# Patient Record
Sex: FEMALE | Race: ASIAN | NOT HISPANIC OR LATINO | Employment: FULL TIME | URBAN - METROPOLITAN AREA
[De-identification: names, ages, dates, MRNs, and addresses within clinical notes are randomized per-mention and may not be internally consistent; named-entity substitution may affect disease eponyms.]

---

## 2017-11-24 ENCOUNTER — OFFICE VISIT (OUTPATIENT)
Dept: OBGYN | Facility: CLINIC | Age: 37
End: 2017-11-24
Payer: COMMERCIAL

## 2017-11-24 VITALS
DIASTOLIC BLOOD PRESSURE: 51 MMHG | HEART RATE: 73 BPM | HEIGHT: 62 IN | WEIGHT: 121 LBS | BODY MASS INDEX: 22.26 KG/M2 | SYSTOLIC BLOOD PRESSURE: 100 MMHG

## 2017-11-24 DIAGNOSIS — Z01.419 WELL FEMALE EXAM WITH ROUTINE GYNECOLOGICAL EXAM: ICD-10-CM

## 2017-11-24 DIAGNOSIS — N64.52 NIPPLE DISCHARGE: Primary | ICD-10-CM

## 2017-11-24 DIAGNOSIS — L20.9 ATOPIC DERMATITIS, UNSPECIFIED TYPE: ICD-10-CM

## 2017-11-24 PROCEDURE — 99214 OFFICE O/P EST MOD 30 MIN: CPT | Performed by: OBSTETRICS & GYNECOLOGY

## 2017-11-24 RX ORDER — FLUOCINONIDE 0.5 MG/G
OINTMENT TOPICAL
Qty: 30 G | Refills: 1 | Status: SHIPPED | OUTPATIENT
Start: 2017-11-24 | End: 2018-10-12

## 2017-11-24 NOTE — MR AVS SNAPSHOT
After Visit Summary   11/24/2017    Lorraine Huston    MRN: [id number]           Patient Information     Date Of Birth          1980        Visit Information        Provider Department      11/24/2017 1:30 PM Morelia Lugo MD [facility]        Today's Diagnoses     Nipple discharge    -  1    Atopic dermatitis, unspecified type        Well female exam with routine gynecological exam          Care Instructions      Preventive Health Recommendations  Female Ages 26 - 39  Yearly exam:   See your health care provider every year in order to    Review health changes.     Discuss preventive care.      Review your medicines if you your doctor has prescribed any.    Until age 30: Get a Pap test every three years (more often if you have had an abnormal result).    After age 30: Talk to your doctor about whether you should have a Pap test every 3 years or have a Pap test with HPV screening every 5 years.   You do not need a Pap test if your uterus was removed (hysterectomy) and you have not had cancer.  You should be tested each year for STDs (sexually transmitted diseases), if you're at risk.   Talk to your provider about how often to have your cholesterol checked.  If you are at risk for diabetes, you should have a diabetes test (fasting glucose).  Shots: Get a flu shot each year. Get a tetanus shot every 10 years.   Nutrition:     Eat at least 5 servings of fruits and vegetables each day.    Eat whole-grain bread, whole-wheat pasta and brown rice instead of white grains and rice.    Talk to your provider about Calcium and Vitamin D.     Lifestyle    Exercise at least 150 minutes a week (30 minutes a day, 5 days of the week). This will help you control your weight and prevent disease.    Limit alcohol to one drink per day.    No smoking.     Wear sunscreen to prevent skin cancer.    See your dentist every six months for an exam and cleaning.            Follow-ups after your  "visit        Future tests that were ordered for you today     Open Future Orders        Priority Expected Expires Ordered    Lipid panel reflex to direct LDL Fasting Routine  1/24/2018 11/24/2017    Prolactin Routine  1/24/2018 11/24/2017            Who to contact     If you have questions or need follow up information about today's clinic visit or your schedule please contact [facility] directly at [phone].  Normal or non-critical lab and imaging results will be communicated to you by Clikthroughhart, letter or phone within 4 business days after the clinic has received the results. If you do not hear from us within 7 days, please contact the clinic through [employer] or phone. If you have a critical or abnormal lab result, we will notify you by phone as soon as possible.  Submit refill requests through [employer] or call your pharmacy and they will forward the refill request to us. Please allow 3 business days for your refill to be completed.          Additional Information About Your Visit        Clikthroughhart Information     [employer] gives you secure access to your electronic health record. If you see a primary care provider, you can also send messages to your care team and make appointments. If you have questions, please call your primary care clinic.  If you do not have a primary care provider, please call [phone] and they will assist you.        Care EveryWhere ID     This is your Care EveryWhere ID. This could be used by other organizations to access your [place] medical records  [id number]        Your Vitals Were     Pulse Height Last Period Breastfeeding? BMI (Body Mass Index)       73 5' 1.75\" (1.568 m) 11/24/2017 No 22.31 kg/m2        Blood Pressure from Last 3 Encounters:   11/24/17 100/51   05/16/16 104/53   05/02/16 97/58    Weight from Last 3 Encounters:   11/24/17 121 lb (54.9 kg)   05/16/16 121 lb (54.9 kg)   05/02/16 121 lb (54.9 kg)                 Today's Medication Changes          These " changes are accurate as of: 11/24/17  3:22 PM.  If you have any questions, ask your nurse or doctor.               Start taking these medicines.        Dose/Directions    fluocinonide 0.05 % ointment   Commonly known as:  LIDEX   Used for:  Atopic dermatitis, unspecified type   Started by:  Morelia Lugo MD        Apply sparingly to affected area on breast twice daily as needed.  Do not apply to face.   Quantity:  30 g   Refills:  1            Where to get your medicines      These medications were sent to [facility] [place] [address] [facility]  [address] [facility] [address]     Phone:  [phone]     fluocinonide 0.05 % ointment                Primary Care Provider    Physician No Ref-Primary       NO REF-PRIMARY PHYSICIAN        Equal Access to Services     [employer] MIRELES : Zain Atkinson, wanadia luqadaha, qaybta kaalmakamron scott, mayco schultz. So [facility] [phone].    ATENCIÓN: Si habla español, tiene a escobedo disposición servicios gratuitos de asistencia lingüística. Llame al [phone].    We comply with applicable federal civil rights laws and [place] laws. We do not discriminate on the basis of race, color, national origin, age, disability, sex, sexual orientation, or gender identity.            Thank you!     Thank you for choosing [facility]  for your care. Our goal is always to provide you with excellent care. Hearing back from our patients is one way we can continue to improve our services. Please take a few minutes to complete the written survey that you may receive in the mail after your visit with us. Thank you!             Your Updated Medication List - Protect others around you: Learn how to safely use, store and throw away your medicines at www.disposemymeds.org.          This list is accurate as of: 11/24/17  3:22 PM.  Always use your most recent med list.                   Brand Name Dispense  Instructions for use Diagnosis    albuterol 108 (90 BASE) MCG/ACT Inhaler    PROAIR HFA/PROVENTIL HFA/VENTOLIN HFA     Inhale 2 puffs into the lungs every 6 hours        fluocinonide 0.05 % ointment    LIDEX    30 g    Apply sparingly to affected area on breast twice daily as needed.  Do not apply to face.    Atopic dermatitis, unspecified type       VITAMIN C PO

## 2017-11-24 NOTE — PROGRESS NOTES
SUBJECTIVE:   CC: Lorraine Huston is an 37 year old woman who presents for preventive health visit.     Healthy Habits:    Do you get at least three servings of calcium containing foods daily (dairy, green leafy vegetables, etc.)? yes    Amount of exercise or daily activities, outside of work: 5 day(s) per week    Problems taking medications regularly No    Medication side effects: No    Have you had an eye exam in the past two years? yes    Do you see a dentist twice per year? yes    Do you have sleep apnea, excessive snoring or daytime drowsiness?no      C/o of itching of her nipples for the last couple of months. Has had this happen to her before in the past. She was referred to dermatology and was prescribed fluocinonide ointment which treated it affectively.   This time she has noticed thickening of skin at the areola of both breasts. She also reports the past 2-3 days noticing a crusty yellow colored discharge on the padding of her bra from both breasts. When she aligned the crusty discharge back on to her breast, it appeared to be touching the surrounding areola region rather than her nipple. She has not noticed spontaneous discharge from her nipples. She has never tried to elicit discharge from her nipples.   She does reports a history of eczema.       Today's PHQ-2 Score: PHQ-2 ( 1999 Pfizer) 11/24/2017 5/2/2016   Q1: Little interest or pleasure in doing things 0 0   Q2: Feeling down, depressed or hopeless 0 0   PHQ-2 Score 0 0         Abuse: Current or Past(Physical, Sexual or Emotional)- No  Do you feel safe in your environment - Yes  Social History   Substance Use Topics     Smoking status: Never Smoker     Smokeless tobacco: Never Used     Alcohol use No     The patient does not drink >3 drinks per day nor >7 drinks per week.    Reviewed orders with patient.  Reviewed health maintenance and updated orders accordingly - Yes  Current Outpatient Prescriptions   Medication Sig Dispense Refill      Ascorbic Acid (VITAMIN C PO)        albuterol (PROAIR HFA, PROVENTIL HFA, VENTOLIN HFA) 108 (90 BASE) MCG/ACT inhaler Inhale 2 puffs into the lungs every 6 hours       [DISCONTINUED] albuterol (PROAIR HFA, PROVENTIL HFA, VENTOLIN HFA) 108 (90 BASE) MCG/ACT inhaler Inhale 2 puffs into the lungs every 6 hours as needed for shortness of breath / dyspnea or wheezing (Patient not taking: Reported on 2017) 1 Inhaler 0     Allergies   Allergen Reactions     Hydrocodone        Mammogram not appropriate for this patient based on age.    Pertinent mammograms are reviewed under the imaging tab.  History of abnormal Pap smear: NO - age 30-65 PAP every 5 years with negative HPV co-testing recommended    Reviewed and updated as needed this visit by clinical staff  Tobacco  Allergies  Meds  Med Hx  Surg Hx  Fam Hx  Soc Hx        Reviewed and updated as needed this visit by Provider        Past Medical History:   Diagnosis Date     Infertility     clomid pregnancy      Past Surgical History:   Procedure Laterality Date     C/SECTION, LOW TRANSVERSE  09    , Low Transverse     Obstetric History       T0      L2     SAB0   TAB0   Ectopic0   Multiple0   Live Births2       # Outcome Date GA Lbr Connor/2nd Weight Sex Delivery Anes PTL Lv   2  09 36w5d  5 lb 2 oz (2.325 kg) M CS-Unspec   SY      Name: Kylah RAMIREZ Robel      Apgar1:  8                Apgar5: 8   1  09 36w5d  4 lb 8.1 oz (2.045 kg) F CS-Unspec   SY      Name: Alysia Huston      Apgar1:  8                Apgar5: 9          ROS:  C: NEGATIVE for fever, chills, change in weight  I: NEGATIVE for worrisome rashes, moles or lesions  E: NEGATIVE for vision changes or irritation  ENT: NEGATIVE for ear, mouth and throat problems  R: NEGATIVE for significant cough or SOB  B: NEGATIVE for masses, tenderness or discharge  CV: NEGATIVE for chest pain, palpitations or peripheral edema  GI: NEGATIVE for nausea,  abdominal pain, heartburn, or change in bowel habits  : NEGATIVE for unusual urinary or vaginal symptoms. Periods are regular.  M: NEGATIVE for significant arthralgias or myalgia  N: NEGATIVE for weakness, dizziness or paresthesias  P: NEGATIVE for changes in mood or affect    OBJECTIVE:   There were no vitals taken for this visit.  EXAM:  GENERAL: healthy, alert and no distress  EYES: Eyes grossly normal to inspection, PERRL and conjunctivae and sclerae normal  HENT: ear canals and TM's normal, nose and mouth without ulcers or lesions  NECK: no adenopathy, no asymmetry, masses, or scars and thyroid normal to palpation  RESP: lungs clear to auscultation - no rales, rhonchi or wheezes  BREAST: normal without masses, tenderness or nipple discharge and no palpable axillary masses or adenopathy. Right areola at 12:00-1:00 position with slightly thickened skin.   CV: regular rate and rhythm, normal S1 S2, no S3 or S4, no murmur, click or rub, no peripheral edema and peripheral pulses strong  ABDOMEN: soft, nontender, no hepatosplenomegaly, no masses and bowel sounds normal  MS: no gross musculoskeletal defects noted, no edema  SKIN: no suspicious lesions or rashes  NEURO: Normal strength and tone, mentation intact and speech normal  PSYCH: mentation appears normal, affect normal/bright    ASSESSMENT/PLAN:   1. Nipple discharge  - Prolactin; Future  - reassurance provided that likely related to dermatitis    2. Atopic dermatitis, unspecified type  - fluocinonide (LIDEX) 0.05 % ointment; Apply sparingly to affected area on breast twice daily as needed.  Do not apply to face.  Dispense: 30 g; Refill: 1  - re-prescribed steroid ointment given prior success     3. Well female exam with routine gynecological exam  - Lipid panel reflex to direct LDL Fasting; Future    COUNSELING:   Reviewed preventive health counseling, as reflected in patient instructions       Regular exercise       Healthy diet/nutrition       reports that  "she has never smoked. She has never used smokeless tobacco.    Estimated body mass index is 22.31 kg/(m^2) as calculated from the following:    Height as of 5/2/16: 5' 1.75\" (1.568 m).    Weight as of 5/16/16: 121 lb (54.9 kg).         Counseling Resources:  ATP IV Guidelines  Pooled Cohorts Equation Calculator  Breast Cancer Risk Calculator  FRAX Risk Assessment  ICSI Preventive Guidelines  Dietary Guidelines for Americans, 2010  USDA's MyPlate  ASA Prophylaxis  Lung CA Screening    Morelia Lugo MD  [facility]  "

## 2017-11-24 NOTE — PROGRESS NOTES
Nipple discharge        Past Medical History:   Diagnosis Date     Infertility     clomid pregnancy     Past Surgical History:   Procedure Laterality Date     C/SECTION, LOW TRANSVERSE  09    , Low Transverse     Current Outpatient Prescriptions   Medication     dextromethorphan-guaiFENesin (MUCINEX DM)  MG per 12 hr tablet     albuterol (PROAIR HFA, PROVENTIL HFA, VENTOLIN HFA) 108 (90 BASE) MCG/ACT inhaler     albuterol (PROAIR HFA, PROVENTIL HFA, VENTOLIN HFA) 108 (90 BASE) MCG/ACT inhaler     Ascorbic Acid (VITAMIN C PO)     FISH OIL     No current facility-administered medications for this visit.         Allergies   Allergen Reactions     Hydrocodone      Social History   Substance Use Topics     Smoking status: Never Smoker     Smokeless tobacco: Never Used     Alcohol use No     Family History   Problem Relation Age of Onset     DIABETES Maternal Grandmother      HEART DISEASE Father      Hypertension Father      CANCER Maternal Grandfather      lung     ROS: 10 point review of systems negative except for pertinent positives stated in the HPI    Exam:   There were no vitals taken for this visit.  General Appearance: Well nourished, well developed female, NAD, AOx3  Gait: Normal  Skin: Normal skin turgor  HEENT: Atraumatic, normocephalic, EOMI  Lungs: Good respiratory effort  Breast:   Abdomen: Soft, NT, ND, no masses  Pelvic: Deferred  Extremities: No clubbing, no cyanosis and no edema    A/P:           Morelia Lugo MD  [facility]

## 2017-11-24 NOTE — NURSING NOTE
"Initial /51 (BP Location: Left arm, Patient Position: Chair, Cuff Size: Adult Regular)  Pulse 73  Ht 5' 1.75\" (1.568 m)  Wt 121 lb (54.9 kg)  LMP 11/24/2017  Breastfeeding? No  BMI 22.31 kg/m2 Estimated body mass index is 22.31 kg/(m^2) as calculated from the following:    Height as of this encounter: 5' 1.75\" (1.568 m).    Weight as of this encounter: 121 lb (54.9 kg). .    Elsy Coulter    "

## 2017-12-11 ENCOUNTER — OFFICE VISIT (OUTPATIENT)
Dept: FAMILY MEDICINE | Facility: CLINIC | Age: 37
End: 2017-12-11
Payer: COMMERCIAL

## 2017-12-11 VITALS
TEMPERATURE: 97.3 F | WEIGHT: 124.8 LBS | HEART RATE: 70 BPM | SYSTOLIC BLOOD PRESSURE: 98 MMHG | BODY MASS INDEX: 23.01 KG/M2 | DIASTOLIC BLOOD PRESSURE: 54 MMHG

## 2017-12-11 DIAGNOSIS — R06.02 SOB (SHORTNESS OF BREATH): Primary | ICD-10-CM

## 2017-12-11 DIAGNOSIS — J98.8 VIRAL RESPIRATORY ILLNESS: ICD-10-CM

## 2017-12-11 DIAGNOSIS — B97.89 VIRAL RESPIRATORY ILLNESS: ICD-10-CM

## 2017-12-11 PROCEDURE — 99213 OFFICE O/P EST LOW 20 MIN: CPT | Performed by: NURSE PRACTITIONER

## 2017-12-11 RX ORDER — ALBUTEROL SULFATE 90 UG/1
2 AEROSOL, METERED RESPIRATORY (INHALATION) EVERY 6 HOURS
Qty: 1 INHALER | Refills: 1 | Status: SHIPPED | OUTPATIENT
Start: 2017-12-11 | End: 2018-10-12

## 2017-12-11 ASSESSMENT — ENCOUNTER SYMPTOMS
DIARRHEA: 0
DIAPHORESIS: 0
HEADACHES: 0
SHORTNESS OF BREATH: 0
FATIGUE: 0
SINUS PRESSURE: 0
COUGH: 1
RHINORRHEA: 0
SORE THROAT: 1
CHILLS: 0
LIGHT-HEADEDNESS: 0
NAUSEA: 0
DIZZINESS: 0
EYE ITCHING: 0
WHEEZING: 1
CHEST TIGHTNESS: 1
EYE DISCHARGE: 0
FEVER: 0
CONSTIPATION: 0

## 2017-12-11 NOTE — PROGRESS NOTES
SUBJECTIVE:   Lorraine Huston is a 37 year old female who presents to clinic today for the following health issues:      ENT Symptoms             Symptoms: cc Present Absent Comment   Fever/Chills   x    Fatigue   x    Muscle Aches   x    Eye Irritation   x    Sneezing   x    Nasal Judd/Drg   x    Sinus Pressure/Pain   x    Loss of smell   x    Dental pain   x    Sore Throat x x  Throat is sore when she takes a deep breath. Had asthma as a child.    Swollen Glands   x    Ear Pain/Fullness   x    Cough  x  Producing light green sputum    Wheeze  x     Chest Pain   x    Shortness of breath   x    Rash   x    Other   x      Symptom duration:  2 days   Symptom severity:  mild   Treatments tried:  Mucinex   Contacts:  children     Sore thorat for the last 2 days. Thinks that sore thorat is from cough. Kids have been ill. Has been having tightness in chest. Chest feels like did when was a kid and had asthma.  Has an albuterol inhaler at home but it is .  Would like to have a refill.      Problem list and histories reviewed & adjusted, as indicated.  Additional history: as documented    Current Outpatient Prescriptions   Medication Sig Dispense Refill     albuterol (PROAIR HFA/PROVENTIL HFA/VENTOLIN HFA) 108 (90 BASE) MCG/ACT Inhaler Inhale 2 puffs into the lungs every 6 hours 1 Inhaler 1     fluocinonide (LIDEX) 0.05 % ointment Apply sparingly to affected area on breast twice daily as needed.  Do not apply to face. 30 g 1     Ascorbic Acid (VITAMIN C PO)        [DISCONTINUED] albuterol (PROAIR HFA, PROVENTIL HFA, VENTOLIN HFA) 108 (90 BASE) MCG/ACT inhaler Inhale 2 puffs into the lungs every 6 hours       Allergies   Allergen Reactions     Hydrocodone        Reviewed and updated as needed this visit by clinical staffTobacco  Allergies  Meds  Med Hx  Surg Hx  Fam Hx  Soc Hx      Reviewed and updated as needed this visit by Provider         ROS:  Review of Systems   Constitutional: Negative for chills,  diaphoresis, fatigue and fever.   HENT: Positive for sore throat. Negative for ear discharge, ear pain, hearing loss, rhinorrhea and sinus pressure.    Eyes: Negative for discharge and itching.   Respiratory: Positive for cough (phlegm productive of slight green phlegm at home. ), chest tightness and wheezing. Negative for shortness of breath.    Gastrointestinal: Negative for constipation, diarrhea and nausea.   Skin: Negative for rash.   Neurological: Negative for dizziness, light-headedness and headaches.         OBJECTIVE:     BP 98/54 (BP Location: Right arm, Patient Position: Sitting, Cuff Size: Adult Regular)  Pulse 70  Temp 97.3  F (36.3  C) (Tympanic)  Wt 124 lb 12.8 oz (56.6 kg)  LMP 11/24/2017  BMI 23.01 kg/m2  Body mass index is 23.01 kg/(m^2).  Physical Exam   Constitutional: She appears well-developed.   HENT:   Right Ear: Tympanic membrane and external ear normal.   Left Ear: Tympanic membrane and external ear normal.   Nose: No mucosal edema or rhinorrhea.   Mouth/Throat:       Cardiovascular: Normal rate, regular rhythm and normal heart sounds.    Pulmonary/Chest: Effort normal and breath sounds normal.   Abdominal: Soft. Bowel sounds are normal.   Neurological: She is alert.   Skin: Skin is warm and dry.   Psychiatric: She has a normal mood and affect.       ASSESSMENT/PLAN:   1. SOB (shortness of breath)  Reviewed treatment plan. Explained the antibiotics are not indicated  Reviewed fever and pain control with tylenol and/or ibuprofen  Instructed to call or return for:  --Symptoms not improved in the next 3 days  --Worsening symptom  --New unexplained symptoms develop   - albuterol (PROAIR HFA/PROVENTIL HFA/VENTOLIN HFA) 108 (90 BASE) MCG/ACT Inhaler; Inhale 2 puffs into the lungs every 6 hours  Dispense: 1 Inhaler; Refill: 1    2. Viral respiratory illness  Reviewed treatment plan. Explained the antibiotics are not indicated  Reviewed fever and pain control with tylenol and/or  ibuprofen  Instructed to call or return for:  --Symptoms not improved in the next 3 days  --Worsening symptom  --New unexplained symptoms develop   - albuterol (PROAIR HFA/PROVENTIL HFA/VENTOLIN HFA) 108 (90 BASE) MCG/ACT Inhaler; Inhale 2 puffs into the lungs every 6 hours  Dispense: 1 Inhaler; Refill: 1    JANNETTE Vo [facility]

## 2017-12-11 NOTE — MR AVS SNAPSHOT
After Visit Summary   12/11/2017    Lorraine Huston    MRN: [id number]           Patient Information     Date Of Birth          1980        Visit Information        Provider Department      12/11/2017 9:40 AM Maria Guadalupe Kingston APRN CNP [facility]        Today's Diagnoses     SOB (shortness of breath)    -  1       Follow-ups after your visit        Your next 10 appointments already scheduled     Dec 15, 2017  8:30 AM CST   LAB with [facility]   [facility]    [address] [address]   [phone]           Please do not eat 10-12 hours before your appointment if you are coming in fasting for labs on lipids, cholesterol, or glucose (sugar). This does not apply to pregnant women. Water, hot tea and black coffee (with nothing added) are okay. Do not drink other fluids, diet soda or chew gum.              Who to contact     Normal or non-critical lab and imaging results will be communicated to you by [employer], letter or phone within 4 business days after the clinic has received the results. If you do not hear from us within 7 days, please contact the clinic through [employer] or phone. If you have a critical or abnormal lab result, we will notify you by phone as soon as possible.  Submit refill requests through [employer] or call your pharmacy and they will forward the refill request to us. Please allow 3 business days for your refill to be completed.          If you need to speak with a  for additional information , please call: [phone]           Additional Information About Your Visit        [employer] Information     [employer] gives you secure access to your electronic health record. If you see a primary care provider, you can also send messages to your care team and make appointments. If you have questions, please call your primary care clinic.  If you do not have a primary care provider, please call  [phone] and they will assist you.        Care EveryWhere ID     This is your Care EveryWhere ID. This could be used by other organizations to access your [place] medical records  [id number]        Your Vitals Were     Pulse Temperature Last Period BMI (Body Mass Index)          70 97.3  F (36.3  C) (Tympanic) 11/24/2017 23.01 kg/m2         Blood Pressure from Last 3 Encounters:   12/11/17 98/54   11/24/17 100/51   05/16/16 104/53    Weight from Last 3 Encounters:   12/11/17 124 lb 12.8 oz (56.6 kg)   11/24/17 121 lb (54.9 kg)   05/16/16 121 lb (54.9 kg)              Today, you had the following     No orders found for display         Where to get your medicines      These medications were sent to [facility] [address]  [address] [facility] [address]     Phone:  [phone]     albuterol 108 (90 BASE) MCG/ACT Inhaler          Primary Care Provider Fax #    Physician No Ref-Primary [phone]       No address on file        Equal Access to Services     [facility] HospitalSANJIV : Hadii kaila gupta hadmeccao Somarcel, waaxda luqadaha, qaybta kaalmada adepatiyada, mayco sams . So [facility] [place] [facility] [phone].    ATENCIÓN: Si habla español, tiene a escobedo disposición servicios gratuitos de asistencia lingüística. Llame al [phone].    We comply with applicable federal civil rights laws and [place] laws. We do not discriminate on the basis of race, color, national origin, age, disability, sex, sexual orientation, or gender identity.            Thank you!     Thank you for choosing [facility]  for your care. Our goal is always to provide you with excellent care. Hearing back from our patients is one way we can continue to improve our services. Please take a few minutes to complete the written survey that you may receive in the mail after your visit with us. Thank you!             Your Updated Medication List - Protect others around you: Learn  how to safely use, store and throw away your medicines at www.disposemymeds.org.          This list is accurate as of: 12/11/17 10:15 AM.  Always use your most recent med list.                   Brand Name Dispense Instructions for use Diagnosis    albuterol 108 (90 BASE) MCG/ACT Inhaler    PROAIR HFA/PROVENTIL HFA/VENTOLIN HFA    1 Inhaler    Inhale 2 puffs into the lungs every 6 hours    SOB (shortness of breath)       fluocinonide 0.05 % ointment    LIDEX    30 g    Apply sparingly to affected area on breast twice daily as needed.  Do not apply to face.    Atopic dermatitis, unspecified type       VITAMIN C PO

## 2017-12-11 NOTE — NURSING NOTE
"Chief Complaint   Patient presents with     Pharyngitis       Initial BP 98/54 (BP Location: Right arm, Patient Position: Sitting, Cuff Size: Adult Regular)  Pulse 70  Temp 97.3  F (36.3  C) (Tympanic)  Wt 124 lb 12.8 oz (56.6 kg)  LMP 11/24/2017  BMI 23.01 kg/m2 Estimated body mass index is 23.01 kg/(m^2) as calculated from the following:    Height as of 11/24/17: 5' 1.75\" (1.568 m).    Weight as of this encounter: 124 lb 12.8 oz (56.6 kg).  Medication Reconciliation: complete     April CHAS Jeronimo      "

## 2017-12-15 DIAGNOSIS — Z01.419 WELL FEMALE EXAM WITH ROUTINE GYNECOLOGICAL EXAM: ICD-10-CM

## 2017-12-15 DIAGNOSIS — N64.52 NIPPLE DISCHARGE: ICD-10-CM

## 2017-12-15 LAB
CHOLEST SERPL-MCNC: 201 MG/DL
HDLC SERPL-MCNC: 84 MG/DL
LDLC SERPL CALC-MCNC: 105 MG/DL
NONHDLC SERPL-MCNC: 117 MG/DL
PROLACTIN SERPL-MCNC: 24 UG/L (ref 3–27)
TRIGL SERPL-MCNC: 62 MG/DL

## 2017-12-15 PROCEDURE — 84146 ASSAY OF PROLACTIN: CPT | Performed by: OBSTETRICS & GYNECOLOGY

## 2017-12-15 PROCEDURE — 36415 COLL VENOUS BLD VENIPUNCTURE: CPT | Performed by: OBSTETRICS & GYNECOLOGY

## 2017-12-15 PROCEDURE — 80061 LIPID PANEL: CPT | Performed by: OBSTETRICS & GYNECOLOGY

## 2017-12-18 NOTE — PROGRESS NOTES
Inform patient that her prolactin level is normal.   Her cholesterol panel has shown improvement in cholesterol but still slightly abnormal.   Recommend healthy diet and exercise.     Morelia Lugo MD  [facility]

## 2017-12-21 ENCOUNTER — ALLIED HEALTH/NURSE VISIT (OUTPATIENT)
Dept: FAMILY MEDICINE | Facility: CLINIC | Age: 37
End: 2017-12-21
Payer: COMMERCIAL

## 2017-12-21 DIAGNOSIS — Z23 NEED FOR PROPHYLACTIC VACCINATION AND INOCULATION AGAINST INFLUENZA: Primary | ICD-10-CM

## 2017-12-21 PROCEDURE — 90686 IIV4 VACC NO PRSV 0.5 ML IM: CPT

## 2017-12-21 PROCEDURE — 90471 IMMUNIZATION ADMIN: CPT

## 2017-12-21 PROCEDURE — 99207 ZZC NO CHARGE NURSE ONLY: CPT

## 2017-12-21 NOTE — MR AVS SNAPSHOT
After Visit Summary   12/21/2017    Lorraine Huston    MRN: [id number]           Patient Information     Date Of Birth          1980        Visit Information        Provider Department      12/21/2017 9:45 AM Joe/Leonarda Jimenez [facility]        Today's Diagnoses     Need for prophylactic vaccination and inoculation against influenza    -  1       Follow-ups after your visit        Who to contact     Normal or non-critical lab and imaging results will be communicated to you by Pure Focushart, letter or phone within 4 business days after the clinic has received the results. If you do not hear from us within 7 days, please contact the clinic through Pure Focushart or phone. If you have a critical or abnormal lab result, we will notify you by phone as soon as possible.  Submit refill requests through Reebonz or call your pharmacy and they will forward the refill request to us. Please allow 3 business days for your refill to be completed.          If you need to speak with a  for additional information , please call: [phone]           Additional Information About Your Visit        Pure FocusharGarages2Envy Information     Reebonz gives you secure access to your electronic health record. If you see a primary care provider, you can also send messages to your care team and make appointments. If you have questions, please call your primary care clinic.  If you do not have a primary care provider, please call [phone] and they will assist you.        Care EveryWhere ID     This is your Care EveryWhere ID. This could be used by other organizations to access your [place] medical records  [id number]        Your Vitals Were     Last Period                   11/24/2017            Blood Pressure from Last 3 Encounters:   12/11/17 98/54   11/24/17 100/51   05/16/16 104/53    Weight from Last 3 Encounters:   12/11/17 124 lb 12.8 oz (56.6 kg)   11/24/17 121 lb (54.9 kg)   05/16/16 121 lb (54.9 kg)               We Performed the Following     FLU VAC, SPLIT VIRUS IM > 3 YO (QUADRIVALENT) [80799]     Vaccine Administration, Initial [[id number]]        Primary Care Provider Fax #    Physician No Ref-Primary [phone]       No address on file        Equal Access to Services     EMERSON MIRELES : Zain kaila gupta gt Soscoutali, wamindyda luqadaha, qaybta kaalmada tyler, mayco kapadia laLudwinvioletta schultz. So [facility] [phone].    ATENCIÓN: Si habla español, tiene a escobedo disposición servicios gratuitos de asistencia lingüística. Llame al [phone].    We comply with applicable federal civil rights laws and [place] laws. We do not discriminate on the basis of race, color, national origin, age, disability, sex, sexual orientation, or gender identity.            Thank you!     Thank you for choosing [facility]  for your care. Our goal is always to provide you with excellent care. Hearing back from our patients is one way we can continue to improve our services. Please take a few minutes to complete the written survey that you may receive in the mail after your visit with us. Thank you!             Your Updated Medication List - Protect others around you: Learn how to safely use, store and throw away your medicines at www.disposemymeds.org.          This list is accurate as of: 12/21/17  9:46 AM.  Always use your most recent med list.                   Brand Name Dispense Instructions for use Diagnosis    albuterol 108 (90 BASE) MCG/ACT Inhaler    PROAIR HFA/PROVENTIL HFA/VENTOLIN HFA    1 Inhaler    Inhale 2 puffs into the lungs every 6 hours    SOB (shortness of breath)       fluocinonide 0.05 % ointment    LIDEX    30 g    Apply sparingly to affected area on breast twice daily as needed.  Do not apply to face.    Atopic dermatitis, unspecified type       VITAMIN C PO

## 2017-12-21 NOTE — PROGRESS NOTES

## 2018-10-01 ENCOUNTER — ALLIED HEALTH/NURSE VISIT (OUTPATIENT)
Dept: FAMILY MEDICINE | Facility: CLINIC | Age: 38
End: 2018-10-01
Payer: COMMERCIAL

## 2018-10-01 DIAGNOSIS — Z23 NEED FOR PROPHYLACTIC VACCINATION AND INOCULATION AGAINST INFLUENZA: Primary | ICD-10-CM

## 2018-10-01 PROCEDURE — 90686 IIV4 VACC NO PRSV 0.5 ML IM: CPT

## 2018-10-01 PROCEDURE — 99207 ZZC NO CHARGE NURSE ONLY: CPT

## 2018-10-01 PROCEDURE — 90471 IMMUNIZATION ADMIN: CPT

## 2018-10-01 NOTE — MR AVS SNAPSHOT
After Visit Summary   10/1/2018    Lorraine Huston    MRN: [id number]           Patient Information     Date Of Birth          1980        Visit Information        Provider Department      10/1/2018 2:00 PM Cma/Lpn, Fl Ll [facility]        Today's Diagnoses     Need for prophylactic vaccination and inoculation against influenza    -  1       Follow-ups after your visit        Your next 10 appointments already scheduled     Oct 01, 2018  2:00 PM CDT   Nurse Only with Fl Ll Cma/Lpn   [facility] ([facility])    [address] [address]   [phone]              Who to contact     Normal or non-critical lab and imaging results will be communicated to you by MetroMilehart, letter or phone within 4 business days after the clinic has received the results. If you do not hear from us within 7 days, please contact the clinic through MetroMilehart or phone. If you have a critical or abnormal lab result, we will notify you by phone as soon as possible.  Submit refill requests through kalidea or call your pharmacy and they will forward the refill request to us. Please allow 3 business days for your refill to be completed.          If you need to speak with a  for additional information , please call: [phone]           Additional Information About Your Visit        MetroMileharCableMatrix Technologies Information     kalidea gives you secure access to your electronic health record. If you see a primary care provider, you can also send messages to your care team and make appointments. If you have questions, please call your primary care clinic.  If you do not have a primary care provider, please call [phone] and they will assist you.        Care EveryWhere ID     This is your Care EveryWhere ID. This could be used by other organizations to access your [place] medical records  [id number]         Blood Pressure from Last 3 Encounters:   12/11/17 98/54    11/24/17 100/51   05/16/16 104/53    Weight from Last 3 Encounters:   12/11/17 124 lb 12.8 oz (56.6 kg)   11/24/17 121 lb (54.9 kg)   05/16/16 121 lb (54.9 kg)              We Performed the Following     FLU VACCINE, SPLIT VIRUS, IM (QUADRIVALENT) [14515]- >3 YRS     Vaccine Administration, Initial [[id number]]        Primary Care Provider Office Phone # Fax #    [facility] [phone] [address] [address]        Equal Access to Services     EMERSON MIREELS : Hadii kaila Atkinson, wamindyda librado, qaybta kaalmada aderamandeep, mayco schultz. So [facility] [phone].    ATENCIÓN: Si habla español, tiene a escobedo disposición servicios gratuitos de asistencia lingüística. Llame al [phone].    We comply with applicable federal civil rights laws and [place] laws. We do not discriminate on the basis of race, color, national origin, age, disability, sex, sexual orientation, or gender identity.            Thank you!     Thank you for choosing [facility]  for your care. Our goal is always to provide you with excellent care. Hearing back from our patients is one way we can continue to improve our services. Please take a few minutes to complete the written survey that you may receive in the mail after your visit with us. Thank you!             Your Updated Medication List - Protect others around you: Learn how to safely use, store and throw away your medicines at www.disposemymeds.org.          This list is accurate as of 10/1/18 11:34 AM.  Always use your most recent med list.                   Brand Name Dispense Instructions for use Diagnosis    albuterol 108 (90 Base) MCG/ACT inhaler    PROAIR HFA/PROVENTIL HFA/VENTOLIN HFA    1 Inhaler    Inhale 2 puffs into the lungs every 6 hours    SOB (shortness of breath)       fluocinonide 0.05 % ointment    LIDEX    30 g    Apply sparingly to affected area on breast twice daily as  needed.  Do not apply to face.    Atopic dermatitis, unspecified type       VITAMIN C PO

## 2018-10-01 NOTE — PROGRESS NOTES
Injectable Influenza Immunization Documentation    1.  Is the person to be vaccinated sick today?   No    2. Does the person to be vaccinated have an allergy to a component   of the vaccine?   No  Egg Allergy Algorithm Link    3. Has the person to be vaccinated ever had a serious reaction   to influenza vaccine in the past?   No    4. Has the person to be vaccinated ever had Guillain-Barré syndrome?   No    Form completed by Vidhi Casper CMA on 10/1/2018 at 11:34 AM

## 2018-10-02 ENCOUNTER — TELEPHONE (OUTPATIENT)
Dept: OBGYN | Facility: CLINIC | Age: 38
End: 2018-10-02

## 2018-10-02 NOTE — TELEPHONE ENCOUNTER
Panel Management Review        Composite cancer screening  Chart review shows that this patient is due/due soon for the following Pap Smear  Summary:    Patient is due/failing the following:   PAP    Action needed:   Patient needs office visit for pap.    Type of outreach:    Sent letter.    Questions for provider review:    None                                                                                                                                    Elsy Coulter

## 2018-10-02 NOTE — LETTER
October 2, 2018      Lorraine BALTAZAR Robel  [address] [address]    Dear MsShirley,      This letter is to remind you that you are due for yourPAP smear .    Please call [phone] to schedule your appointment at your earliest convenience.     If you have completed the tests outside of [place], please have the results forwarded to our office. We will update the chart for your primary Physician to review before your next annual physical.     Sincerely,      Morelia Lugo MD

## 2018-10-10 ENCOUNTER — OFFICE VISIT (OUTPATIENT)
Dept: FAMILY MEDICINE | Facility: CLINIC | Age: 38
End: 2018-10-10
Payer: COMMERCIAL

## 2018-10-10 VITALS
TEMPERATURE: 99 F | WEIGHT: 121.6 LBS | HEART RATE: 67 BPM | SYSTOLIC BLOOD PRESSURE: 102 MMHG | OXYGEN SATURATION: 99 % | HEIGHT: 61 IN | BODY MASS INDEX: 22.96 KG/M2 | DIASTOLIC BLOOD PRESSURE: 48 MMHG

## 2018-10-10 DIAGNOSIS — T78.40XA ALLERGIC REACTION, INITIAL ENCOUNTER: Primary | ICD-10-CM

## 2018-10-10 PROCEDURE — 99213 OFFICE O/P EST LOW 20 MIN: CPT | Performed by: PHYSICIAN ASSISTANT

## 2018-10-10 RX ORDER — HYDROXYZINE HYDROCHLORIDE 25 MG/1
25-50 TABLET, FILM COATED ORAL
Qty: 30 TABLET | Refills: 1 | Status: SHIPPED | OUTPATIENT
Start: 2018-10-10 | End: 2019-07-03

## 2018-10-10 RX ORDER — LORATADINE 10 MG/1
10 TABLET ORAL DAILY
Qty: 30 TABLET | Refills: 0 | Status: SHIPPED | OUTPATIENT
Start: 2018-10-10 | End: 2019-07-03

## 2018-10-10 RX ORDER — HYDROXYZINE HYDROCHLORIDE 25 MG/1
25-50 TABLET, FILM COATED ORAL
Qty: 30 TABLET | Refills: 1 | Status: SHIPPED | OUTPATIENT
Start: 2018-10-10 | End: 2018-10-10

## 2018-10-10 RX ORDER — LORATADINE 10 MG/1
10 TABLET ORAL DAILY
Qty: 30 TABLET | Refills: 0 | Status: SHIPPED | OUTPATIENT
Start: 2018-10-10 | End: 2018-10-10

## 2018-10-10 NOTE — PROGRESS NOTES
SUBJECTIVE:   Lorraine Huston is a 38 year old female who presents to clinic today for the following health issues:      Ear sweeling      Duration: 2 days    Description (location/character/radiation): Swelling both ears, is starting down her neck. Red and tender. Some itching, denies pain. Started at pumpkin patch on     Intensity:  Worsening    Accompanying signs and symptoms: Dill pickles and dry ranch seasoning(contains MSG) says these are new foods this weekend.     History (similar episodes/previous evaluation): Does have sensitivities to some detergents and has had reactions in past.     Precipitating or alleviating factors: None    Therapies tried and outcome: Zyrtec with some improvement. Has not taken today would like evaluated.     She states she ate a new ranch dressing powder and noticed swelling about 3 hours later.  She does have a h/o allergy to dyes/detergents.  Ears are also itchy.  No drainage.    No swelling in lips or tongue.           Problem list and histories reviewed & adjusted, as indicated.  Additional history: as documented    Patient Active Problem List   Diagnosis     CARDIOVASCULAR SCREENING; LDL GOAL LESS THAN 160     24 hr info given     Past Surgical History:   Procedure Laterality Date     C/SECTION, LOW TRANSVERSE  09    , Low Transverse       Social History   Substance Use Topics     Smoking status: Never Smoker     Smokeless tobacco: Never Used     Alcohol use No     Family History   Problem Relation Age of Onset     Diabetes Maternal Grandmother      HEART DISEASE Father      Hypertension Father      Cancer Maternal Grandfather      lung     Deep Vein Thrombosis Paternal Grandmother      Hypertension Paternal Grandmother          Current Outpatient Prescriptions   Medication Sig Dispense Refill     Ascorbic Acid (VITAMIN C PO)        hydrOXYzine (ATARAX) 25 MG tablet Take 1-2 tablets (25-50 mg) by mouth nightly as needed for itching 30 tablet 1      "loratadine (CLARITIN) 10 MG tablet Take 1 tablet (10 mg) by mouth daily 30 tablet 0     Multiple Vitamins-Minerals (MULTIVITAMIN & MINERAL PO)        cetirizine (ZYRTEC) 10 MG tablet Take 1 tablet (10 mg) by mouth every evening 30 tablet 1     predniSONE (DELTASONE) 20 MG tablet Take 2 tabs (40 mg) daily x 4 days, 1 tab (20 mg) daily x 4 days, then 1/2 tab (10 mg) x 4 days. 14 tablet 0     ranitidine (ZANTAC) 300 MG tablet Take 1 tablet (300 mg) by mouth At Bedtime 30 tablet 1     BP Readings from Last 3 Encounters:   10/12/18 113/68   10/10/18 102/48   12/11/17 98/54    Wt Readings from Last 3 Encounters:   10/12/18 121 lb (54.9 kg)   10/10/18 121 lb 9.6 oz (55.2 kg)   12/11/17 124 lb 12.8 oz (56.6 kg)                    Reviewed and updated as needed this visit by clinical staff       Reviewed and updated as needed this visit by Provider         ROS:  Constitutional, HEENT, cardiovascular, pulmonary, gi and gu systems are negative, except as otherwise noted.    OBJECTIVE:     /48  Pulse 67  Temp 99  F (37.2  C) (Tympanic)  Ht 5' 1.25\" (1.556 m)  Wt 121 lb 9.6 oz (55.2 kg)  LMP 09/25/2018 (Exact Date)  SpO2 99%  BMI 22.79 kg/m2  Body mass index is 22.79 kg/(m^2).  GENERAL: healthy, alert and no distress  HEENT:  Normocephalic, atraumatic head.  Eyelids, conjunctiva, sclera normal. PERRL.  Tympanic membranes shiny without retraction.  Canals unremarkable.  Hearing grossly intact.  No abnormality of the nose or sinuses noted.  Normal oropharynx  Bilateral ears with diffuse swelling and erythema.  No warmth.  Some faint wheals notes down neck.    NECK: no adenopathy, no asymmetry, masses, or scars and thyroid normal to palpation  RESP: lungs clear to auscultation - no rales, rhonchi or wheezes  CV: regular rate and rhythm, normal S1 S2, no S3 or S4, no murmur, click or rub, no peripheral edema and peripheral pulses strong  MS: no gross musculoskeletal defects noted, no edema    Diagnostic Test " Results:  none     ASSESSMENT/PLAN:       1. Allergic reaction, initial encounter  No involvement in lips/tongue, no compromised airway.  Will treat with hydroxyzine at night and claritin during the day.  F/u if symptoms change or worsen.  Suggest f/u with allergy to determine trigger.   - ALLERGY/ASTHMA ADULT REFERRAL  - hydrOXYzine (ATARAX) 25 MG tablet; Take 1-2 tablets (25-50 mg) by mouth nightly as needed for itching  Dispense: 30 tablet; Refill: 1  - loratadine (CLARITIN) 10 MG tablet; Take 1 tablet (10 mg) by mouth daily  Dispense: 30 tablet; Refill: 0    CONSULTATION/REFERRAL to allergy    Suzi Shaikh PA-C  [facility]

## 2018-10-10 NOTE — NURSING NOTE
"Initial /48  Pulse 67  Temp 99  F (37.2  C) (Tympanic)  Ht 5' 1.25\" (1.556 m)  Wt 121 lb 9.6 oz (55.2 kg)  LMP 09/25/2018 (Exact Date)  SpO2 99%  BMI 22.79 kg/m2 Estimated body mass index is 22.79 kg/(m^2) as calculated from the following:    Height as of this encounter: 5' 1.25\" (1.556 m).    Weight as of this encounter: 121 lb 9.6 oz (55.2 kg). .    Vonda Rain CMA(AMAA)    "

## 2018-10-10 NOTE — MR AVS SNAPSHOT
After Visit Summary   10/10/2018    Lorraine Huston    MRN: [id number]           Patient Information     Date Of Birth          1980        Visit Information        Provider Department      10/10/2018 3:20 PM Suzi Shaikh PA-C [facility]        Today's Diagnoses     Allergic reaction, initial encounter    -  1       Follow-ups after your visit        Additional Services     ALLERGY/ASTHMA ADULT REFERRAL       Your provider has referred you to: G:  [facility] [phone] [email or web address]/    Please be aware that coverage of these services is subject to the terms and limitations of your health insurance plan.  Call member services at your health plan with any benefit or coverage questions.      Please bring the following with you to your appointment:    (1) Any X-Rays, CTs or MRIs which have been performed.  Contact the facility where they were done to arrange for  prior to your scheduled appointment.    (2) List of current medications  (3) This referral request   (4) Any documents/labs given to you for this referral                  Your next 10 appointments already scheduled     Oct 25, 2018  2:40 PM CDT   New Visit with Al Ritter MD   [facility] ([facility])    [address] [address] [phone]              Who to contact     Normal or non-critical lab and imaging results will be communicated to you by MyChart, letter or phone within 4 business days after the clinic has received the results. If you do not hear from us within 7 days, please contact the clinic through MyChart or phone. If you have a critical or abnormal lab result, we will notify you by phone as soon as possible.  Submit refill requests through BeeBillion or call your pharmacy and they will forward the refill request to us. Please allow 3 business days for your refill to be completed.        "   If you need to speak with a  for additional information , please call: [phone]           Additional Information About Your Visit        Equinexthart Information     [employer] gives you secure access to your electronic health record. If you see a primary care provider, you can also send messages to your care team and make appointments. If you have questions, please call your primary care clinic.  If you do not have a primary care provider, please call [phone] and they will assist you.        Care EveryWhere ID     This is your Care EveryWhere ID. This could be used by other organizations to access your [place] medical records  [id number]        Your Vitals Were     Pulse Temperature Height Last Period Pulse Oximetry BMI (Body Mass Index)    67 99  F (37.2  C) (Tympanic) 5' 1.25\" (1.556 m) 09/25/2018 (Exact Date) 99% 22.79 kg/m2       Blood Pressure from Last 3 Encounters:   10/12/18 113/68   10/10/18 102/48   12/11/17 98/54    Weight from Last 3 Encounters:   10/12/18 121 lb (54.9 kg)   10/10/18 121 lb 9.6 oz (55.2 kg)   12/11/17 124 lb 12.8 oz (56.6 kg)              We Performed the Following     ALLERGY/ASTHMA ADULT REFERRAL          Today's Medication Changes          These changes are accurate as of 10/10/18 11:59 PM.  If you have any questions, ask your nurse or doctor.               Start taking these medicines.        Dose/Directions    hydrOXYzine 25 MG tablet   Commonly known as:  ATARAX   Used for:  Allergic reaction, initial encounter   Started by:  Suzi Shaikh PA-C        Dose:  25-50 mg   Take 1-2 tablets (25-50 mg) by mouth nightly as needed for itching   Quantity:  30 tablet   Refills:  1       loratadine 10 MG tablet   Commonly known as:  CLARITIN   Used for:  Allergic reaction, initial encounter   Started by:  Suzi Shaikh PA-C        Dose:  10 mg   Take 1 tablet (10 mg) by mouth daily   Quantity:  30 tablet   Refills:  0            Where to get your " medicines      These medications were sent to [facility] [address]  [address], [facility] [address]     Phone:  [phone]     hydrOXYzine 25 MG tablet    loratadine 10 MG tablet                Primary Care Provider Office Phone # Fax #    [facility] [phone] [address] [address]        Equal Access to Services     EMERSON MIRELES : Hadii aad ku hadasho Soomaali, waaxda luqadaha, qaybta kaalmada adeegyada, waxay idiin hayaan adeeg kharash lagarth schulzt. So [facility] [phone].    ATENCIÓN: Si habla español, tiene a escobedo disposición servicios gratuitos de asistencia lingüística. Destinyame al [phone].    We comply with applicable federal civil rights laws and [place] laws. We do not discriminate on the basis of race, color, national origin, age, disability, sex, sexual orientation, or gender identity.            Thank you!     Thank you for choosing [facility]  for your care. Our goal is always to provide you with excellent care. Hearing back from our patients is one way we can continue to improve our services. Please take a few minutes to complete the written survey that you may receive in the mail after your visit with us. Thank you!             Your Updated Medication List - Protect others around you: Learn how to safely use, store and throw away your medicines at www.disposemymeds.org.          This list is accurate as of 10/10/18 11:59 PM.  Always use your most recent med list.                   Brand Name Dispense Instructions for use Diagnosis    hydrOXYzine 25 MG tablet    ATARAX    30 tablet    Take 1-2 tablets (25-50 mg) by mouth nightly as needed for itching    Allergic reaction, initial encounter       loratadine 10 MG tablet    CLARITIN    30 tablet    Take 1 tablet (10 mg) by mouth daily    Allergic reaction, initial encounter       MULTIVITAMIN & MINERAL PO           VITAMIN C PO

## 2018-10-12 ENCOUNTER — OFFICE VISIT (OUTPATIENT)
Dept: FAMILY MEDICINE | Facility: CLINIC | Age: 38
End: 2018-10-12
Payer: COMMERCIAL

## 2018-10-12 ENCOUNTER — TELEPHONE (OUTPATIENT)
Dept: FAMILY MEDICINE | Facility: CLINIC | Age: 38
End: 2018-10-12

## 2018-10-12 VITALS
HEIGHT: 61 IN | HEART RATE: 87 BPM | OXYGEN SATURATION: 99 % | DIASTOLIC BLOOD PRESSURE: 68 MMHG | BODY MASS INDEX: 22.84 KG/M2 | TEMPERATURE: 99.6 F | SYSTOLIC BLOOD PRESSURE: 113 MMHG | WEIGHT: 121 LBS

## 2018-10-12 DIAGNOSIS — L50.9 HIVES: Primary | ICD-10-CM

## 2018-10-12 DIAGNOSIS — T78.40XD ALLERGIC REACTION, SUBSEQUENT ENCOUNTER: ICD-10-CM

## 2018-10-12 PROCEDURE — 99213 OFFICE O/P EST LOW 20 MIN: CPT | Performed by: PHYSICIAN ASSISTANT

## 2018-10-12 RX ORDER — PREDNISONE 20 MG/1
TABLET ORAL
Qty: 14 TABLET | Refills: 0 | Status: SHIPPED | OUTPATIENT
Start: 2018-10-12 | End: 2018-11-08

## 2018-10-12 RX ORDER — CETIRIZINE HYDROCHLORIDE 10 MG/1
10 TABLET ORAL EVERY EVENING
Qty: 30 TABLET | Refills: 1 | Status: SHIPPED | OUTPATIENT
Start: 2018-10-12 | End: 2019-07-03

## 2018-10-12 NOTE — MR AVS SNAPSHOT
After Visit Summary   10/12/2018    Lorraine Huston    MRN: [id number]           Patient Information     Date Of Birth          1980        Visit Information        Provider Department      10/12/2018 1:00 PM Lena Barba PA-C [facility]        Today's Diagnoses     Hives    -  1    Allergic reaction, subsequent encounter          Care Instructions    Stop Claritin  Continue hydroxyzine every 4-6 hours as needed  Use zyrtec 10 mg in AM  Use zantac 300 mg at bedtime  Make appointment for allergist  Be seen urgently if worsening symptoms       Understanding Hives (Urticaria)  Urticaria (hives) are red, itchy, and swollen areas on the skin. They are most often an allergic reaction from eating a food or taking a medicine. Sometimes the cause may be unknown. A single hive can vary in size from a half inch to several inches. Hives can appear all over the body. Or they may appear on only one part of the body.  What causes hives?  Hives can be caused by food and beverages such as:    Tree nuts (almonds, walnuts, hazelnuts)    Peanuts    Eggs    Shellfish    Milk  Hives can also be caused by medicines such as:    Antibiotics, especially penicillin and sulfa-based medicines     Anticonvulsant or antiseizure medicines     Chemotherapy medicines   Other causes of hives include:    Infection or virus    Heat    Cold air or cold water    Exercise    Scratching or rubbing your skin, or wearing tight-fitting clothes that rub your skin    Being exposed to sunlight or light from a light bulb, in rare cases    Inhaled-chemicals in the environment from foods and drugs, insects, plants, or other sources  In some cases, hives may occur again and again with no specific cause.  If you have hives    Stay away from the food, drink, medicine, or other thing that may be causing the hives.    Ask your healthcare provider how to control itchy or irritated skin.    Talk with your healthcare provider right away  if you think a medicine gave you hives.  Watch for anaphylaxis  If you have hives, watch for symptoms of a severe reaction that can affect your entire body. This is called anaphylaxis. Symptoms can include swollen areas of the body, wheezing, trouble breathing or swallowing, and a hoarse voice. This reaction may happen right away. Or it may happen in an hour or more. In extreme cases, the airways from mouth to lungs may swell and make breathing difficult. This is a medical emergency. Use epinephrine medicine if you have it, and call 911 or go to the emergency room.     When to call your healthcare provider  Call your healthcare provider if:    Your hives feel uncomfortable    You have never had hives before    Your symptoms don't go away or come back    Your symptoms get worse or new symptoms develop such as:   ? Sneezing, coughing, runny or stuffy nose  ? Itching of the eyes, nose, or roof of the mouth  ? Itching, burning, stinging, or pain  ? Dry, flaky, cracking, or scaly skin  ? Red or purple spots  Call 911  Call 911 right away if you have:    Swelling in your lips, tongue, or throat, called angioedema    Drooling    Trouble breathing, talking, or swallowing    Cool, moist or pale (blue in color) skin    Fast and weak heartbeat    Wheezing or short of breath    Feeling lightheaded or confused    Diarrhea    Severe nausea or vomiting    Seizure    Feeling dizzy or weak, or a sudden drop in blood pressure   Date Last Reviewed: 4/1/2017 2000-2017 [employer]. [address] [place] [address]. All rights reserved. This information is not intended as a substitute for professional medical care. Always follow your healthcare professional's instructions.                Follow-ups after your visit        Additional Services     ALLERGY/ASTHMA ADULT REFERRAL       Your provider has referred you to: FMMENDOZA:  [facility] [phone]  [email or web address]  FMG: [facility] [phone] [email or web address]    Please be aware that coverage of these services is subject to the terms and limitations of your health insurance plan.  Call member services at your health plan with any benefit or coverage questions.      Please bring the following with you to your appointment:    (1) Any X-Rays, CTs or MRIs which have been performed.  Contact the facility where they were done to arrange for  prior to your scheduled appointment.    (2) List of current medications  (3) This referral request   (4) Any documents/labs given to you for this referral                  Who to contact     Normal or non-critical lab and imaging results will be communicated to you by [employer], letter or phone within 4 business days after the clinic has received the results. If you do not hear from us within 7 days, please contact the clinic through [employer] or phone. If you have a critical or abnormal lab result, we will notify you by phone as soon as possible.  Submit refill requests through [employer] or call your pharmacy and they will forward the refill request to us. Please allow 3 business days for your refill to be completed.          If you need to speak with a  for additional information , please call: [phone]             Additional Information About Your Visit        [employer] Information     [employer] gives you secure access to your electronic health record. If you see a primary care provider, you can also send messages to your care team and make appointments. If you have questions, please call your primary care clinic.  If you do not have a primary care provider, please call [phone] and they will assist you.        Care EveryWhere ID     This is your Care EveryWhere ID. This could be used by other organizations to access your [place] medical records  [id number]        Your Vitals  "Were     Pulse Temperature Height Last Period Pulse Oximetry BMI (Body Mass Index)    87 99.6  F (37.6  C) (Tympanic) 5' 1.25\" (1.556 m) 09/25/2018 (Exact Date) 99% 22.68 kg/m2       Blood Pressure from Last 3 Encounters:   10/12/18 113/68   10/10/18 102/48   12/11/17 98/54    Weight from Last 3 Encounters:   10/12/18 121 lb (54.9 kg)   10/10/18 121 lb 9.6 oz (55.2 kg)   12/11/17 124 lb 12.8 oz (56.6 kg)              We Performed the Following     ALLERGY/ASTHMA ADULT REFERRAL          Today's Medication Changes          These changes are accurate as of 10/12/18  1:28 PM.  If you have any questions, ask your nurse or doctor.               Start taking these medicines.        Dose/Directions    cetirizine 10 MG tablet   Commonly known as:  zyrTEC   Used for:  Hives, Allergic reaction, subsequent encounter   Started by:  Lena Barba PA-C        Dose:  10 mg   Take 1 tablet (10 mg) by mouth every evening   Quantity:  30 tablet   Refills:  1       predniSONE 20 MG tablet   Commonly known as:  DELTASONE   Used for:  Hives, Allergic reaction, subsequent encounter   Started by:  Lena Barba PA-C        Take 2 tabs (40 mg) daily x 4 days, 1 tab (20 mg) daily x 4 days, then 1/2 tab (10 mg) x 4 days.   Quantity:  14 tablet   Refills:  0       ranitidine 300 MG tablet   Commonly known as:  ZANTAC   Used for:  Hives, Allergic reaction, subsequent encounter   Started by:  Lena Barba PA-C        Dose:  300 mg   Take 1 tablet (300 mg) by mouth At Bedtime   Quantity:  30 tablet   Refills:  1            Where to get your medicines      These medications were sent to [facility] [address] [place] [facility] [address] [facility]  [address] [facility] [address]     Phone:  [phone]     cetirizine 10 MG tablet    predniSONE 20 MG tablet    ranitidine 300 MG tablet                Primary Care Provider Office Phone # Fax #    [facility] [phone] [address] [facility]" [facility] [address]        Equal Access to Services     [facility] ALINE : Hadii kaila ku hadmeccao Soscoutali, waaxda luqadaha, qaybta kaalmada adetailakamron, waxmyles john garciaseliecersusanna schultz. So [facility] [phone].    ATENCIÓN: Si habla español, tiene a escobedo disposición servicios gratuitos de asistencia lingüística. Nella al [phone].    We comply with applicable federal civil rights laws and [place] laws. We do not discriminate on the basis of race, color, national origin, age, disability, sex, sexual orientation, or gender identity.            Thank you!     Thank you for choosing [facility] (formerly [facility])  for your care. Our goal is always to provide you with excellent care. Hearing back from our patients is one way we can continue to improve our services. Please take a few minutes to complete the written survey that you may receive in the mail after your visit with us. Thank you!             Your Updated Medication List - Protect others around you: Learn how to safely use, store and throw away your medicines at www.disposemymeds.org.          This list is accurate as of 10/12/18  1:28 PM.  Always use your most recent med list.                   Brand Name Dispense Instructions for use Diagnosis    cetirizine 10 MG tablet    zyrTEC    30 tablet    Take 1 tablet (10 mg) by mouth every evening    Hives, Allergic reaction, subsequent encounter       hydrOXYzine 25 MG tablet    ATARAX    30 tablet    Take 1-2 tablets (25-50 mg) by mouth nightly as needed for itching    Allergic reaction, initial encounter       loratadine 10 MG tablet    CLARITIN    30 tablet    Take 1 tablet (10 mg) by mouth daily    Allergic reaction, initial encounter       MULTIVITAMIN & MINERAL PO           predniSONE 20 MG tablet    DELTASONE    14 tablet    Take 2 tabs (40 mg) daily x 4 days, 1 tab (20 mg) daily x 4 days, then 1/2 tab (10 mg) x 4 days.    Hives, Allergic reaction, subsequent encounter       ranitidine 300 MG tablet    ZANTAC    30  tablet    Take 1 tablet (300 mg) by mouth At Bedtime    Hives, Allergic reaction, subsequent encounter       VITAMIN C PO

## 2018-10-12 NOTE — PATIENT INSTRUCTIONS
Stop Claritin  Continue hydroxyzine every 4-6 hours as needed  Use zyrtec 10 mg in AM  Use zantac 300 mg at bedtime  Prednisone taper  Make appointment for allergist  Be seen urgently if worsening symptoms       Understanding Hives (Urticaria)  Urticaria (hives) are red, itchy, and swollen areas on the skin. They are most often an allergic reaction from eating a food or taking a medicine. Sometimes the cause may be unknown. A single hive can vary in size from a half inch to several inches. Hives can appear all over the body. Or they may appear on only one part of the body.  What causes hives?  Hives can be caused by food and beverages such as:    Tree nuts (almonds, walnuts, hazelnuts)    Peanuts    Eggs    Shellfish    Milk  Hives can also be caused by medicines such as:    Antibiotics, especially penicillin and sulfa-based medicines     Anticonvulsant or antiseizure medicines     Chemotherapy medicines   Other causes of hives include:    Infection or virus    Heat    Cold air or cold water    Exercise    Scratching or rubbing your skin, or wearing tight-fitting clothes that rub your skin    Being exposed to sunlight or light from a light bulb, in rare cases    Inhaled-chemicals in the environment from foods and drugs, insects, plants, or other sources  In some cases, hives may occur again and again with no specific cause.  If you have hives    Stay away from the food, drink, medicine, or other thing that may be causing the hives.    Ask your healthcare provider how to control itchy or irritated skin.    Talk with your healthcare provider right away if you think a medicine gave you hives.  Watch for anaphylaxis  If you have hives, watch for symptoms of a severe reaction that can affect your entire body. This is called anaphylaxis. Symptoms can include swollen areas of the body, wheezing, trouble breathing or swallowing, and a hoarse voice. This reaction may happen right away. Or it may happen in an hour or more. In  extreme cases, the airways from mouth to lungs may swell and make breathing difficult. This is a medical emergency. Use epinephrine medicine if you have it, and call 911 or go to the emergency room.     When to call your healthcare provider  Call your healthcare provider if:    Your hives feel uncomfortable    You have never had hives before    Your symptoms don't go away or come back    Your symptoms get worse or new symptoms develop such as:   ? Sneezing, coughing, runny or stuffy nose  ? Itching of the eyes, nose, or roof of the mouth  ? Itching, burning, stinging, or pain  ? Dry, flaky, cracking, or scaly skin  ? Red or purple spots  Call 911  Call 911 right away if you have:    Swelling in your lips, tongue, or throat, called angioedema    Drooling    Trouble breathing, talking, or swallowing    Cool, moist or pale (blue in color) skin    Fast and weak heartbeat    Wheezing or short of breath    Feeling lightheaded or confused    Diarrhea    Severe nausea or vomiting    Seizure    Feeling dizzy or weak, or a sudden drop in blood pressure   Date Last Reviewed: 4/1/2017 2000-2017 [employer]. [address] [place] [address]. All rights reserved. This information is not intended as a substitute for professional medical care. Always follow your healthcare professional's instructions.

## 2018-10-12 NOTE — TELEPHONE ENCOUNTER
Voicemail message left for patient to return call regarding symptoms and clinic visit scheduled today, seen 2 d ago for same issue.  Guadalupe NELSON/MARY     Left total hip replacement

## 2018-10-12 NOTE — PROGRESS NOTES
SUBJECTIVE:   Lorraine Huston is a 38 year old female who presents to clinic today for the following health issues:    Chief Complaint   Patient presents with     Allergic Reaction     Follow up 10/10/2018, things are not getting any better     *  Thinks it maybe allergy to dill, she had added dill to her ranch dressing and also had dill pickles for the first time 1 week ago.  Also stated that her blood pressure is a little higher than normal and she normally runs a lower temp    She was seen 10/10/18  Given hydroxyzine, claritin. Hydroxyzine only at night  It doesn't itch really but the ear is still enlarged  She has lots of allergies to soaps, dyes. She makes her own soap  However she did wash hands at [employer] yesterday (hasn't been there for a long time) and then got a rash on her arms  Patient denies any new detergents, lotions, clothes, foods, medications.    Did have similar reaction to this plus all over body after eating peanuts in [place] a couple years ago  Had to go to ED  She eats peanuts here though    She denies swelling of throat, tongue, lips, difficulty breathing/anaphylaxis symptoms       Problem list and histories reviewed & adjusted, as indicated.  Additional history: as documented    Patient Active Problem List   Diagnosis     CARDIOVASCULAR SCREENING; LDL GOAL LESS THAN 160     24 hr info given     Past Surgical History:   Procedure Laterality Date     C/SECTION, LOW TRANSVERSE  09    , Low Transverse       Social History   Substance Use Topics     Smoking status: Never Smoker     Smokeless tobacco: Never Used     Alcohol use No     Family History   Problem Relation Age of Onset     Diabetes Maternal Grandmother      HEART DISEASE Father      Hypertension Father      Cancer Maternal Grandfather      lung     Deep Vein Thrombosis Paternal Grandmother      Hypertension Paternal Grandmother            Reviewed and updated as needed this visit by clinical staff  Tobacco   "Allergies  Meds  Problems  Med Hx  Surg Hx  Fam Hx  Soc Hx        Reviewed and updated as needed this visit by Provider  Tobacco  Allergies  Problems  Med Hx  Surg Hx  Fam Hx  Soc Hx        ROS:  Other than noted above, general, HEENT, respiratory, cardiac, MS, and gastrointestinal systems are negative.     OBJECTIVE:     /68  Pulse 87  Temp 99.6  F (37.6  C) (Tympanic)  Ht 5' 1.25\" (1.556 m)  Wt 121 lb (54.9 kg)  LMP 09/25/2018 (Exact Date)  BMI 22.68 kg/m2  Body mass index is 22.68 kg/(m^2).  GENERAL: healthy, alert and no distress  EYES: Eyes grossly normal to inspection, PERRL and conjunctivae and sclerae normal  HENT: ear canals and TM's normal, nose and mouth without ulcers or lesions  NECK: no adenopathy, no asymmetry, masses, or scars and thyroid normal to palpation  RESP: lungs clear to auscultation - no rales, rhonchi or wheezes  CV: regular rate and rhythm, normal S1 S2, no S3 or S4, no murmur, click or rub, no peripheral edema and peripheral pulses strong  ABDOMEN: soft, nontender, no hepatosplenomegaly, no masses and bowel sounds normal  MS: no gross musculoskeletal defects noted, no edema    SKIN: POSITIVE   Cheeks show faint hive like wheals  Bilateral ears show increased erythema, swelling/inflammation  No spreading erythema from this  Bilateral forearms show multiple small papules scattered    Patient well appearing, breathing easily in clinic, speaking in full sentences easily, no signs of cyanosis or respiratory distress.     ASSESSMENT/PLAN:     ASSESSMENT/PLAN:      ICD-10-CM    1. Hives L50.9 predniSONE (DELTASONE) 20 MG tablet     ranitidine (ZANTAC) 300 MG tablet     cetirizine (ZYRTEC) 10 MG tablet     ALLERGY/ASTHMA ADULT REFERRAL   2. Allergic reaction, subsequent encounter T78.40XD predniSONE (DELTASONE) 20 MG tablet     ranitidine (ZANTAC) 300 MG tablet     cetirizine (ZYRTEC) 10 MG tablet     ALLERGY/ASTHMA ADULT REFERRAL     Unknown etiology of allergic " reaction, although patient suspicious due to [employer], ranch powder  Red flag signs to be seen urgently were discussed in depth. Medication risks/benefits were discussed.     Patient Instructions     Stop Claritin  Continue hydroxyzine every 4-6 hours as needed  Use zyrtec 10 mg in AM  Use zantac 300 mg at bedtime  Prednisone taper  Make appointment for allergist  Be seen urgently if worsening symptoms     Lena Barba PA-C  [facility]

## 2018-10-22 ENCOUNTER — HEALTH MAINTENANCE LETTER (OUTPATIENT)
Age: 38
End: 2018-10-22

## 2018-11-08 ENCOUNTER — OFFICE VISIT (OUTPATIENT)
Dept: ALLERGY | Facility: CLINIC | Age: 38
End: 2018-11-08
Payer: COMMERCIAL

## 2018-11-08 VITALS
RESPIRATION RATE: 16 BRPM | WEIGHT: 121.2 LBS | OXYGEN SATURATION: 100 % | TEMPERATURE: 97.2 F | HEART RATE: 72 BPM | DIASTOLIC BLOOD PRESSURE: 53 MMHG | BODY MASS INDEX: 22.71 KG/M2 | SYSTOLIC BLOOD PRESSURE: 83 MMHG

## 2018-11-08 DIAGNOSIS — L30.9 DERMATITIS: Primary | ICD-10-CM

## 2018-11-08 PROCEDURE — 99204 OFFICE O/P NEW MOD 45 MIN: CPT | Performed by: ALLERGY & IMMUNOLOGY

## 2018-11-08 RX ORDER — TRIAMCINOLONE ACETONIDE 1 MG/G
OINTMENT TOPICAL
Qty: 80 G | Refills: 0 | Status: SHIPPED | OUTPATIENT
Start: 2018-11-08 | End: 2019-07-03

## 2018-11-08 RX ORDER — DESONIDE 0.5 MG/G
CREAM TOPICAL
Qty: 15 G | Refills: 0 | Status: SHIPPED | OUTPATIENT
Start: 2018-11-08 | End: 2019-07-03

## 2018-11-08 ASSESSMENT — ENCOUNTER SYMPTOMS
JOINT SWELLING: 0
ARTHRALGIAS: 0
CHEST TIGHTNESS: 0
RHINORRHEA: 0
EYE ITCHING: 0
NAUSEA: 0
VOMITING: 0
COUGH: 0
ADENOPATHY: 0
EYE DISCHARGE: 0
SHORTNESS OF BREATH: 0
SINUS PRESSURE: 0
FACIAL SWELLING: 1
HEADACHES: 0
EYE REDNESS: 0
MYALGIAS: 0
ACTIVITY CHANGE: 0
DIARRHEA: 0
WHEEZING: 0
FEVER: 0
CHILLS: 0

## 2018-11-08 NOTE — LETTER
"    11/8/2018         RE: Lorraine Huston  [id number] [facility] [id number]        Dear Colleague,    Thank you for referring your patient, Lorraine Huston, to the [facility]. Please see a copy of my visit note below.    SUBJECTIVE:                                                                   Lorraine Huston presents today to our Allergy Clinic at [facility], she is being seen in consultation at the request of TIFFANI Gross.    She is a 38-year-old female with an episode of bilateral ear swelling and rash.   It started on 10/7/18 at a pumpkin patch. On that day, she also had dill pickles and dry seasoning that was containing MSG.     She has never had issues getting  foods before.  Swelling of the ears and the rash on the face and neck started hours after.  Associated with erythema and some tenderness.  They were somewhat itchy as well.  On 10/10/18, she was seen by Family Practice.  On exam, the provider documented \"bilateral ears with diffuse swelling and erythema.  No warmth.  Some faint wheals notes down neck \".  She was recommended to start hydroxyzine at night and Claritin during the day.  Despite that, she continued having similar symptoms on the same spots, ears, neck, and face.  She was seen two days later again.  On exam \"Bilateral ears show increased erythema, swelling/inflammation.\"    She was recommended to start prednisone, cetirizine, and ranitidine.  It seemed to help with ears swelling; however, after that the started flaking a little.  The rash on the face resolved, and the one on the neck improved.    Per patient, the rash was fixed and was not coming and going.  It was not associated with vomiting, nausea, diarrhea, wheezing, or rhinoconjunctivitis symptoms.    She admits changing shampoo several days prior to that episode.    also reports having a multiple years history of rashes after wearing cheap jewelry and after washing dishes " or using some soaps.      Patient Active Problem List   Diagnosis     CARDIOVASCULAR SCREENING; LDL GOAL LESS THAN 160     24 hr info given       Past Medical History:   Diagnosis Date     Infertility     clomid pregnancy      Problem (# of Occurrences) Relation (Name,Age of Onset)    Cancer (1) Maternal Grandfather: lung    Deep Vein Thrombosis (1) Paternal Grandmother    Diabetes (1) Maternal Grandmother    HEART DISEASE (1) Father    Hypertension (2) Father, Paternal Grandmother        Past Surgical History:   Procedure Laterality Date     C/SECTION, LOW TRANSVERSE  09    , Low Transverse     Social History     Social History     Marital status:      Spouse name: N/A     Number of children: N/A     Years of education: N/A     Occupational History     Paraprofessional      Social History Main Topics     Smoking status: Never Smoker     Smokeless tobacco: Never Used     Alcohol use No     Drug use: No     Sexual activity: Yes     Partners: Male     Birth control/ protection: Condom, Male Surgical      Comment: vasectomy     Other Topics Concern      Service No     Blood Transfusions No     Caffeine Concern No     Occupational Exposure No     Hobby Hazards Yes     down hill ski     Sleep Concern No     Stress Concern No     Weight Concern No     Special Diet Yes     eat healthy (not a milk drinker drink calcium OJ     Back Care Yes     Left upper and Left shoulder     Exercise Yes     Bike Helmet Not Asked     N/A     Seat Belt Yes     Self-Exams Yes     Parent/Sibling W/ Cabg, Mi Or Angioplasty Before 65f 55m? No     Social History Narrative    2018    ENVIRONMENTAL HISTORY: The family [place] an [place] year [place]. The home is heated with a forced air. They do have central air conditioning. The patient's bedroom is furnished with carpeting in bedroom. No pets inside the house. There is no history of cockroach or mice infestation. There are no smokers in  the house.  The house does not have a damp basement.                Review of Systems   Constitutional: Negative for activity change, chills and fever.   HENT: Positive for ear pain and facial swelling. Negative for congestion, dental problem, nosebleeds, postnasal drip, rhinorrhea, sinus pressure and sneezing.    Eyes: Negative for discharge, redness and itching.   Respiratory: Negative for cough, chest tightness, shortness of breath and wheezing.    Cardiovascular: Negative for chest pain.   Gastrointestinal: Negative for diarrhea, nausea and vomiting.   Musculoskeletal: Negative for arthralgias, joint swelling and myalgias.   Skin: Positive for rash.   Neurological: Negative for headaches.   Hematological: Negative for adenopathy.   Psychiatric/Behavioral: Negative for behavioral problems and self-injury.           Current Outpatient Prescriptions:      Ascorbic Acid (VITAMIN C PO), , Disp: , Rfl:      desonide (DESOWEN) 0.05 % cream, Apply sparingly to affected area two times daily as needed up to 14 days, Disp: 15 g, Rfl: 0     Multiple Vitamins-Minerals (MULTIVITAMIN & MINERAL PO), , Disp: , Rfl:      triamcinolone (KENALOG) 0.1 % ointment, Apply sparingly to affected area two times daily as needed but not more than 14 days in a row. Spare face, armpits, neck, and groin., Disp: 80 g, Rfl: 0     cetirizine (ZYRTEC) 10 MG tablet, Take 1 tablet (10 mg) by mouth every evening (Patient not taking: Reported on 11/8/2018), Disp: 30 tablet, Rfl: 1     hydrOXYzine (ATARAX) 25 MG tablet, Take 1-2 tablets (25-50 mg) by mouth nightly as needed for itching (Patient not taking: Reported on 11/8/2018), Disp: 30 tablet, Rfl: 1     loratadine (CLARITIN) 10 MG tablet, Take 1 tablet (10 mg) by mouth daily (Patient not taking: Reported on 11/8/2018), Disp: 30 tablet, Rfl: 0     ranitidine (ZANTAC) 300 MG tablet, Take 1 tablet (300 mg) by mouth At Bedtime (Patient not taking: Reported on 11/8/2018), Disp: 30 tablet, Rfl:  1  Immunization History   Administered Date(s) Administered     Influenza (H1N1) 11/04/2009     Influenza (IIV3) PF 09/02/2009, 10/25/2011     Influenza Intranasal Vaccine 11/27/2012     Influenza Intranasal Vaccine 4 valent 10/23/2014     Influenza Vaccine IM 3yrs+ 4 Valent IIV4 10/19/2016, 12/21/2017, 10/01/2018     Mantoux Tuberculin Skin Test 12/22/2014     TD (ADULT, 7+) 10/10/2004     TDAP Vaccine (Adacel) 08/16/2012     Allergies   Allergen Reactions     Hydrocodone      OBJECTIVE:                                                                 BP (!) 83/53 (BP Location: Right arm, Patient Position: Sitting, Cuff Size: Adult Regular)  Pulse 72  Temp 97.2  F (36.2  C) (Oral)  Resp 16  Wt 55 kg (121 lb 3.2 oz)  SpO2 100%  BMI 22.71 kg/m2        Physical Exam   Constitutional: She is oriented to person, place, and time. No distress.   HENT:   Head: Normocephalic and atraumatic.   Right Ear: Tympanic membrane, external ear and ear canal normal.   Left Ear: Tympanic membrane, external ear and ear canal normal.   Nose: No mucosal edema or rhinorrhea.   Mouth/Throat: Oropharynx is clear and moist and mucous membranes are normal.   Eyes: Conjunctivae are normal. Right eye exhibits no discharge. Left eye exhibits no discharge.   Neck: Normal range of motion.   Cardiovascular: Normal rate, regular rhythm and normal heart sounds.    No murmur heard.  Pulmonary/Chest: Effort normal and breath sounds normal. No respiratory distress. She has no wheezes. She has no rales.   Musculoskeletal: Normal range of motion.   Neurological: She is alert and oriented to person, place, and time.   Skin: Skin is warm. She is not diaphoretic.   Ears bilaterally with slight flaking.  Neck bilaterally with slight lichenification and postinflammatory hyperpigmentation.  Faint eczematous rash on the anterior aspect of the lower neck and upper chest.    Macular papular rash on dorsal aspect of both hands, and flexors or forearms.    Psychiatric: Affect normal.   Nursing note and vitals reviewed.      ASSESSMENT/PLAN:      Visit Diagnoses     Dermatitis    -  Primary  The history and the current physical exam are more consistent with contact dermatitis than angioedema and urticaria.  Urticarial lesions usually do not last more than 24-48 hours.  Ear swelling persisted more than 96 hours which is not characteristic for histamine-induced angioedema.  --Stop using new shampoo, and switch back to previous that she was able to tolerate well.  Desonide for face twice daily as needed but not more than 14 days in a row.  Triamcinolone: Apply sparingly to the affected area two times daily as needed but not more than 14 days in a row. Spare face, armpits, neck, and groin.  Skin care regimen reviewed with the patient: Eliminate harsh soaps, i.e., Dial, zest, Deepthi spring;  Use mild soaps such as Cetaphil or Dove sensitive skin, avoid hot or cold showers, aggressive use of emollients including Vanicream, Cetaphil or Cerave.  She is interested in patch testing since had frequent episodes of dermatitis in her hands.  --Will discuss that once she is better.    Relevant Medications    desonide (DESOWEN) 0.05 % cream    triamcinolone (KENALOG) 0.1 % ointment            Return in about 4 weeks (around 12/6/2018), or if symptoms worsen or fail to improve.    Thank you for allowing us to participate in the care of this patient. Please feel free to contact us if there are any questions or concerns about the patient.    Disclaimer: This note consists of symbols derived from keyboarding, dictation and/or voice recognition software. As a result, there may be errors in the script that have gone undetected. Please consider this when interpreting information found in this chart.    lA Ritter MD, FACI  Allergy, Asthma and Immunology  [facility], [place] and [place]      Rash pictures.    Again, thank you for allowing me to participate in the care of  your patient.        Sincerely,        Al Ritter MD

## 2018-11-08 NOTE — PROGRESS NOTES
"SUBJECTIVE:                                                                   Lorraine Huston presents today to our Allergy Clinic at [facility], she is being seen in consultation at the request of TIFFANI Gross.    She is a 38-year-old female with an episode of bilateral ear swelling and rash.   It started on 10/7/18 at a pumpkin patch. On that day, she also had dill pickles and dry seasoning that was containing MSG.     She has never had issues getting  foods before.  Swelling of the ears and the rash on the face and neck started hours after.  Associated with erythema and some tenderness.  They were somewhat itchy as well.  On 10/10/18, she was seen by Family Practice.  On exam, the provider documented \"bilateral ears with diffuse swelling and erythema.  No warmth.  Some faint wheals notes down neck \".  She was recommended to start hydroxyzine at night and Claritin during the day.  Despite that, she continued having similar symptoms on the same spots, ears, neck, and face.  She was seen two days later again.  On exam \"Bilateral ears show increased erythema, swelling/inflammation.\"    She was recommended to start prednisone, cetirizine, and ranitidine.  It seemed to help with ears swelling; however, after that the started flaking a little.  The rash on the face resolved, and the one on the neck improved.    Per patient, the rash was fixed and was not coming and going.  It was not associated with vomiting, nausea, diarrhea, wheezing, or rhinoconjunctivitis symptoms.    She admits changing shampoo several days prior to that episode.    also reports having a multiple years history of rashes after wearing cheap jewelry and after washing dishes or using some soaps.      Patient Active Problem List   Diagnosis     CARDIOVASCULAR SCREENING; LDL GOAL LESS THAN 160     24 hr info given       Past Medical History:   Diagnosis Date     Infertility     clomid pregnancy      Problem (# of " Occurrences) Relation (Name,Age of Onset)    Cancer (1) Maternal Grandfather: lung    Deep Vein Thrombosis (1) Paternal Grandmother    Diabetes (1) Maternal Grandmother    HEART DISEASE (1) Father    Hypertension (2) Father, Paternal Grandmother        Past Surgical History:   Procedure Laterality Date     C/SECTION, LOW TRANSVERSE  09    , Low Transverse     Social History     Social History     Marital status:      Spouse name: N/A     Number of children: N/A     Years of education: N/A     Occupational History     Paraprofessional      Social History Main Topics     Smoking status: Never Smoker     Smokeless tobacco: Never Used     Alcohol use No     Drug use: No     Sexual activity: Yes     Partners: Male     Birth control/ protection: Condom, Male Surgical      Comment: vasectomy     Other Topics Concern      Service No     Blood Transfusions No     Caffeine Concern No     Occupational Exposure No     Hobby Hazards Yes     down hill ski     Sleep Concern No     Stress Concern No     Weight Concern No     Special Diet Yes     eat healthy (not a milk drinker drink calcium OJ     Back Care Yes     Left upper and Left shoulder     Exercise Yes     Bike Helmet Not Asked     N/A     Seat Belt Yes     Self-Exams Yes     Parent/Sibling W/ Cabg, Mi Or Angioplasty Before 65f 55m? No     Social History Narrative    2018    ENVIRONMENTAL HISTORY: The family [place] an 8 year [place]. The home is heated with a forced air. They do have central air conditioning. The patient's bedroom is furnished with carpeting in bedroom. No pets inside the house. There is no history of cockroach or mice infestation. There are no smokers in the house.  The house does not have a damp basement.                Review of Systems   Constitutional: Negative for activity change, chills and fever.   HENT: Positive for ear pain and facial swelling. Negative for congestion, dental  problem, nosebleeds, postnasal drip, rhinorrhea, sinus pressure and sneezing.    Eyes: Negative for discharge, redness and itching.   Respiratory: Negative for cough, chest tightness, shortness of breath and wheezing.    Cardiovascular: Negative for chest pain.   Gastrointestinal: Negative for diarrhea, nausea and vomiting.   Musculoskeletal: Negative for arthralgias, joint swelling and myalgias.   Skin: Positive for rash.   Neurological: Negative for headaches.   Hematological: Negative for adenopathy.   Psychiatric/Behavioral: Negative for behavioral problems and self-injury.           Current Outpatient Prescriptions:      Ascorbic Acid (VITAMIN C PO), , Disp: , Rfl:      desonide (DESOWEN) 0.05 % cream, Apply sparingly to affected area two times daily as needed up to 14 days, Disp: 15 g, Rfl: 0     Multiple Vitamins-Minerals (MULTIVITAMIN & MINERAL PO), , Disp: , Rfl:      triamcinolone (KENALOG) 0.1 % ointment, Apply sparingly to affected area two times daily as needed but not more than 14 days in a row. Spare face, armpits, neck, and groin., Disp: 80 g, Rfl: 0     cetirizine (ZYRTEC) 10 MG tablet, Take 1 tablet (10 mg) by mouth every evening (Patient not taking: Reported on 11/8/2018), Disp: 30 tablet, Rfl: 1     hydrOXYzine (ATARAX) 25 MG tablet, Take 1-2 tablets (25-50 mg) by mouth nightly as needed for itching (Patient not taking: Reported on 11/8/2018), Disp: 30 tablet, Rfl: 1     loratadine (CLARITIN) 10 MG tablet, Take 1 tablet (10 mg) by mouth daily (Patient not taking: Reported on 11/8/2018), Disp: 30 tablet, Rfl: 0     ranitidine (ZANTAC) 300 MG tablet, Take 1 tablet (300 mg) by mouth At Bedtime (Patient not taking: Reported on 11/8/2018), Disp: 30 tablet, Rfl: 1  Immunization History   Administered Date(s) Administered     Influenza (H1N1) 11/04/2009     Influenza (IIV3) PF 09/02/2009, 10/25/2011     Influenza Intranasal Vaccine 11/27/2012     Influenza Intranasal Vaccine 4 valent 10/23/2014      Influenza Vaccine IM 3yrs+ 4 Valent IIV4 10/19/2016, 12/21/2017, 10/01/2018     Mantoux Tuberculin Skin Test 12/22/2014     TD (ADULT, 7+) 10/10/2004     TDAP Vaccine (Adacel) 08/16/2012     Allergies   Allergen Reactions     Hydrocodone      OBJECTIVE:                                                                 BP (!) 83/53 (BP Location: Right arm, Patient Position: Sitting, Cuff Size: Adult Regular)  Pulse 72  Temp 97.2  F (36.2  C) (Oral)  Resp 16  Wt 55 kg (121 lb 3.2 oz)  SpO2 100%  BMI 22.71 kg/m2        Physical Exam   Constitutional: She is oriented to person, place, and time. No distress.   HENT:   Head: Normocephalic and atraumatic.   Right Ear: Tympanic membrane, external ear and ear canal normal.   Left Ear: Tympanic membrane, external ear and ear canal normal.   Nose: No mucosal edema or rhinorrhea.   Mouth/Throat: Oropharynx is clear and moist and mucous membranes are normal.   Eyes: Conjunctivae are normal. Right eye exhibits no discharge. Left eye exhibits no discharge.   Neck: Normal range of motion.   Cardiovascular: Normal rate, regular rhythm and normal heart sounds.    No murmur heard.  Pulmonary/Chest: Effort normal and breath sounds normal. No respiratory distress. She has no wheezes. She has no rales.   Musculoskeletal: Normal range of motion.   Neurological: She is alert and oriented to person, place, and time.   Skin: Skin is warm. She is not diaphoretic.   Ears bilaterally with slight flaking.  Neck bilaterally with slight lichenification and postinflammatory hyperpigmentation.  Faint eczematous rash on the anterior aspect of the lower neck and upper chest.    Macular papular rash on dorsal aspect of both hands, and flexors or forearms.   Psychiatric: Affect normal.   Nursing note and vitals reviewed.      ASSESSMENT/PLAN:      Visit Diagnoses     Dermatitis    -  Primary  The history and the current physical exam are more consistent with contact dermatitis than angioedema and  urticaria.  Urticarial lesions usually do not last more than 24-48 hours.  Ear swelling persisted more than 96 hours which is not characteristic for histamine-induced angioedema.  --Stop using new shampoo, and switch back to previous that she was able to tolerate well.  Desonide for face twice daily as needed but not more than 14 days in a row.  Triamcinolone: Apply sparingly to the affected area two times daily as needed but not more than 14 days in a row. Spare face, armpits, neck, and groin.  Skin care regimen reviewed with the patient: Eliminate harsh soaps, i.e., Dial, zest, [place] spring;  Use mild soaps such as Cetaphil or Dove sensitive skin, avoid hot or cold showers, aggressive use of emollients including Vanicream, Cetaphil or Cerave.  She is interested in patch testing since had frequent episodes of dermatitis in her hands.  --Will discuss that once she is better.    Relevant Medications    desonide (DESOWEN) 0.05 % cream    triamcinolone (KENALOG) 0.1 % ointment            Return in about 4 weeks (around 12/6/2018), or if symptoms worsen or fail to improve.    Thank you for allowing us to participate in the care of this patient. Please feel free to contact us if there are any questions or concerns about the patient.    Disclaimer: This note consists of symbols derived from keyboarding, dictation and/or voice recognition software. As a result, there may be errors in the script that have gone undetected. Please consider this when interpreting information found in this chart.    Al Ritter MD, FACI  Allergy, Asthma and Immunology  [place], [place] and [place]

## 2018-11-08 NOTE — MR AVS SNAPSHOT
After Visit Summary   11/8/2018    Lorraine Huston    MRN: [id number]           Patient Information     Date Of Birth          1980        Visit Information        Provider Department      11/8/2018 2:40 PM Al Ritter MD [facility]        Today's Diagnoses     Dermatitis    -  1      Care Instructions    Desonide for face twice daily as needed but not more than 14 days in a row.  Triamcinolone: Apply sparingly to affected area two times daily as needed but not more than 14 days in a row. Spare face, armpits, neck, and groin.      Eucerin twice daily.     Eliminate harsh soaps, i.e., Dial, zest, [place]oth[place] spring;  Use mild soaps, aggressive use of emollients including Vanicream, Cetaphil or Cerave .    The average pH level (acidity or alkaline) of soap is 9 to 10. The skin s normal pH level is 4 to 5. Because of this difference, soap increases the skin s pH to an undesirable level and can worsen skin dryness.  It is best to use a non-soap cleanser because they are usually free of sodium lauryl sulfate. This chemical creates soap s foaming action and can irritate skin. Examples of non-soap cleansers include Dove  Sensitive Skin Unscented Beauty Bar, Aquaphor  Gentle Wash, AVEENO  Advanced Care Wash, Basis  Sensitive Skin Bar, CeraVe  Hydrating Cleanser, and Cetaphil  Gentle Cleansing Bar.          Follow-ups after your visit        Follow-up notes from your care team     Return in about 4 weeks (around 12/6/2018), or if symptoms worsen or fail to improve.      Your next 10 appointments already scheduled     Dec 11, 2018  3:00 PM CST   PHYSICAL with Morelia Lugo MD   [facility]    [address] [facility] [address] [phone]              Who to contact     If you have questions or need follow up information about today's clinic visit or your schedule please contact [facility] directly at  [phone].  Normal or non-critical lab and imaging results will be communicated to you by "[employer]hart, letter or phone within 4 business days after the clinic has received the results. If you do not hear from us within 7 days, please contact the clinic through "[employer]hart or phone. If you have a critical or abnormal lab result, we will notify you by phone as soon as possible.  Submit refill requests through [employer] or call your pharmacy and they will forward the refill request to us. Please allow 3 business days for your refill to be completed.          Additional Information About Your Visit        "[employer] Information     [employer] gives you secure access to your electronic health record. If you see a primary care provider, you can also send messages to your care team and make appointments. If you have questions, please call your primary care clinic.  If you do not have a primary care provider, please call [phone] and they will assist you.        Care EveryWhere ID     This is your Care EveryWhere ID. This could be used by other organizations to access your [facility] medical records  [id number]        Your Vitals Were     Pulse Temperature Respirations Pulse Oximetry BMI (Body Mass Index)       72 97.2  F (36.2  C) (Oral) 16 100% 22.71 kg/m2        Blood Pressure from Last 3 Encounters:   11/08/18 (!) 83/53   10/12/18 113/68   10/10/18 102/48    Weight from Last 3 Encounters:   11/08/18 55 kg (121 lb 3.2 oz)   10/12/18 54.9 kg (121 lb)   10/10/18 55.2 kg (121 lb 9.6 oz)              Today, you had the following     No orders found for display         Today's Medication Changes          These changes are accurate as of 11/8/18  3:45 PM.  If you have any questions, ask your nurse or doctor.               Start taking these medicines.        Dose/Directions    desonide 0.05 % cream   Commonly known as:  DESOWEN   Used for:  Dermatitis   Started by:  Al Ritter MD        Apply sparingly to affected area two times  daily as needed up to 14 days   Quantity:  15 g   Refills:  0       triamcinolone 0.1 % ointment   Commonly known as:  KENALOG   Used for:  Dermatitis   Started by:  Al Ritter MD        Apply sparingly to affected area two times daily as needed but not more than 14 days in a row. Spare face, armpits, neck, and groin.   Quantity:  80 g   Refills:  0         Stop taking these medicines if you haven't already. Please contact your care team if you have questions.     predniSONE 20 MG tablet   Commonly known as:  DELTASONE   Stopped by:  Al Ritter MD                Where to get your medicines      These medications were sent to [facility] [address] [facility]  [address] [place] [address]     Phone:  [phone]     desonide 0.05 % cream    triamcinolone 0.1 % ointment                Primary Care Provider Office Phone # Fax #    [facility] [phone] [address] [facility] [address]        Equal Access to Services     [facility]: Hadii kaila gupta hadasho Soomaali, waaxda luqadaha, qaybta kaalmada adeegyada, waxmyles sams . So [facility] [phone].    ATENCIÓN: Si habla español, tiene a escobedo disposición servicios gratuitos de asistencia lingüística. Llame al [phone].    We comply with applicable federal civil rights laws and [place] laws. We do not discriminate on the basis of race, color, national origin, age, disability, sex, sexual orientation, or gender identity.            Thank you!     Thank you for choosing [facility]  for your care. Our goal is always to provide you with excellent care. Hearing back from our patients is one way we can continue to improve our services. Please take a few minutes to complete the written survey that you may receive in the mail after your visit with us. Thank you!             Your Updated Medication List - Protect others around you: Learn  how to safely use, store and throw away your medicines at www.disposemymeds.org.          This list is accurate as of 11/8/18  3:45 PM.  Always use your most recent med list.                   Brand Name Dispense Instructions for use Diagnosis    cetirizine 10 MG tablet    zyrTEC    30 tablet    Take 1 tablet (10 mg) by mouth every evening    Hives, Allergic reaction, subsequent encounter       desonide 0.05 % cream    DESOWEN    15 g    Apply sparingly to affected area two times daily as needed up to 14 days    Dermatitis       hydrOXYzine 25 MG tablet    ATARAX    30 tablet    Take 1-2 tablets (25-50 mg) by mouth nightly as needed for itching    Allergic reaction, initial encounter       loratadine 10 MG tablet    CLARITIN    30 tablet    Take 1 tablet (10 mg) by mouth daily    Allergic reaction, initial encounter       MULTIVITAMIN & MINERAL PO           ranitidine 300 MG tablet    ZANTAC    30 tablet    Take 1 tablet (300 mg) by mouth At Bedtime    Hives, Allergic reaction, subsequent encounter       triamcinolone 0.1 % ointment    KENALOG    80 g    Apply sparingly to affected area two times daily as needed but not more than 14 days in a row. Spare face, armpits, neck, and groin.    Dermatitis       VITAMIN C PO

## 2018-11-08 NOTE — PATIENT INSTRUCTIONS
Desonide for face twice daily as needed but not more than 14 days in a row.  Triamcinolone: Apply sparingly to affected area two times daily as needed but not more than 14 days in a row. Spare face, armpits, neck, and groin.      Eucerin twice daily.     Eliminate harsh soaps, i.e., Dial, zest, [place] spring;  Use mild soaps, aggressive use of emollients including Vanicream, Cetaphil or Cerave .    The average pH level (acidity or alkaline) of soap is 9 to 10. The skin s normal pH level is 4 to 5. Because of this difference, soap increases the skin s pH to an undesirable level and can worsen skin dryness.  It is best to use a non-soap cleanser because they are usually free of sodium lauryl sulfate. This chemical creates soap s foaming action and can irritate skin. Examples of non-soap cleansers include Dove  Sensitive Skin Unscented Beauty Bar, Aquaphor  Gentle Wash, AVEENO  Advanced Care Wash, Basis  Sensitive Skin Bar, CeraVe  Hydrating Cleanser, and Cetaphil  Gentle Cleansing Bar.

## 2018-12-14 ENCOUNTER — TELEPHONE (OUTPATIENT)
Dept: OBGYN | Facility: CLINIC | Age: 38
End: 2018-12-14

## 2018-12-14 NOTE — TELEPHONE ENCOUNTER
Panel Management Review          Composite cancer screening  Chart review shows that this patient is due/due soon for the following Pap Smear  Summary:    Patient is due/failing the following:   PAP    Action needed:   Patient needs office visit for pap smear.    Type of outreach:    Sent letter.    Questions for provider review:    None                                                                                                                                    Elsy Coulter

## 2018-12-14 NOTE — LETTER
December 14, 2018      Lorraine BALTAZAR Robel  [address] [facility] [address]    Dear MsShirley,      This letter is to remind you that you are due for your follow up PAP smear and and annual exam.    Please call [phone] to schedule your appointment at your earliest convenience.     If you have completed the tests outside of [place], please have the results forwarded to our office. We will update the chart for your primary Physician to review before your next annual physical.     Sincerely,      Morelia Lugo MD

## 2019-01-07 ENCOUNTER — TELEPHONE (OUTPATIENT)
Dept: ALLERGY | Facility: CLINIC | Age: 39
End: 2019-01-07

## 2019-01-07 NOTE — TELEPHONE ENCOUNTER
Called and spoke with pt regarding Patch testing. Informed of clinic hours and times. Dr Ritter would like to do patch placement on Mondays, removal on Wednesday and 2nd reading on Fridays. With potiential 3rd reading on the following Monday. Informed pt that he will discuss with is partner Dr Wright as she is in clinic on Wednesdays but [place]. They will discuss upon her return. Pt agreeable. Staff will call her to schedule at a later date. Will pend note for week of 1/14/2019 to discuss.     Clarice SALDAÑA   Allergy RN

## 2019-01-07 NOTE — TELEPHONE ENCOUNTER
Reason for Call:  Other appointment    Detailed comments: Pt want to schedule allergy skin testing, please call    Phone Number Patient can be reached at: Cell number on file:    Telephone Information:   Mobile [phone]       Best Time: today    Can we leave a detailed message on this number? YES    Call taken on 1/7/2019 at 3:50 PM by Lakisha Kilgore

## 2019-01-10 ENCOUNTER — OFFICE VISIT (OUTPATIENT)
Dept: OBGYN | Facility: CLINIC | Age: 39
End: 2019-01-10
Payer: COMMERCIAL

## 2019-01-10 VITALS
TEMPERATURE: 98.6 F | HEIGHT: 61 IN | WEIGHT: 121.8 LBS | SYSTOLIC BLOOD PRESSURE: 104 MMHG | BODY MASS INDEX: 23 KG/M2 | HEART RATE: 69 BPM | DIASTOLIC BLOOD PRESSURE: 60 MMHG | RESPIRATION RATE: 16 BRPM

## 2019-01-10 DIAGNOSIS — R42 DIZZINESS: ICD-10-CM

## 2019-01-10 DIAGNOSIS — Z01.419 WELL FEMALE EXAM WITH ROUTINE GYNECOLOGICAL EXAM: Primary | ICD-10-CM

## 2019-01-10 LAB — TSH SERPL DL<=0.005 MIU/L-ACNC: 2.34 MU/L (ref 0.4–4)

## 2019-01-10 PROCEDURE — 87624 HPV HI-RISK TYP POOLED RSLT: CPT | Performed by: OBSTETRICS & GYNECOLOGY

## 2019-01-10 PROCEDURE — 87491 CHLMYD TRACH DNA AMP PROBE: CPT | Performed by: OBSTETRICS & GYNECOLOGY

## 2019-01-10 PROCEDURE — 99213 OFFICE O/P EST LOW 20 MIN: CPT | Mod: 25 | Performed by: OBSTETRICS & GYNECOLOGY

## 2019-01-10 PROCEDURE — 36415 COLL VENOUS BLD VENIPUNCTURE: CPT | Performed by: OBSTETRICS & GYNECOLOGY

## 2019-01-10 PROCEDURE — 87591 N.GONORRHOEAE DNA AMP PROB: CPT | Performed by: OBSTETRICS & GYNECOLOGY

## 2019-01-10 PROCEDURE — 99395 PREV VISIT EST AGE 18-39: CPT | Performed by: OBSTETRICS & GYNECOLOGY

## 2019-01-10 PROCEDURE — 84443 ASSAY THYROID STIM HORMONE: CPT | Performed by: OBSTETRICS & GYNECOLOGY

## 2019-01-10 PROCEDURE — G0145 SCR C/V CYTO,THINLAYER,RESCR: HCPCS | Performed by: OBSTETRICS & GYNECOLOGY

## 2019-01-10 ASSESSMENT — MIFFLIN-ST. JEOR: SCORE: 1173.82

## 2019-01-10 NOTE — NURSING NOTE
"Initial /60 (BP Location: Left arm, Patient Position: Sitting, Cuff Size: Adult Regular)   Pulse 69   Temp 98.6  F (37  C) (Tympanic)   Resp 16   Ht 1.556 m (5' 1.25\")   Wt 55.2 kg (121 lb 12.8 oz)   LMP 01/06/2018   Breastfeeding? No   BMI 22.83 kg/m   Estimated body mass index is 22.83 kg/m  as calculated from the following:    Height as of this encounter: 1.556 m (5' 1.25\").    Weight as of this encounter: 55.2 kg (121 lb 12.8 oz). .    Vonda Dill CMA    "

## 2019-01-10 NOTE — PROGRESS NOTES
" SUBJECTIVE:   CC: Lorraine Huston is an 38 year old woman who presents for preventive health visit.     \"Deb\"  Had an episode several months which sounds like a vasovagal episode, but since then her exercise tolerance has been much lower.  No explicit sensation of heart racing, but she is worried that if something were to happen to her, she has kids and would be really worried about them, so she'd like to check it out.    Due for pap smear.  Consents to STI screening    Healthy Habits:    Do you get at least three servings of calcium containing foods daily (dairy, green leafy vegetables, etc.)? yes    Amount of exercise or daily activities, outside of work: 5-6 day(s) per week    Problems taking medications regularly No    Medication side effects: No    Have you had an eye exam in the past two years? no    Do you see a dentist twice per year? yes    Do you have sleep apnea, excessive snoring or daytime drowsiness?no      Fainting symptoms    Today's PHQ-2 Score:   PHQ-2 ( 1999 [employer]) 1/10/2019 11/24/2017   Q1: Little interest or pleasure in doing things 0 0   Q2: Feeling down, depressed or hopeless 0 0   PHQ-2 Score 0 0       Abuse: Current or Past(Physical, Sexual or Emotional)- No  Do you feel safe in your environment? Yes    Social History     Tobacco Use     Smoking status: Never Smoker     Smokeless tobacco: Never Used   Substance Use Topics     Alcohol use: No     If you drink alcohol do you typically have >3 drinks per day or >7 drinks per week? No                     Reviewed orders with patient.  Reviewed health maintenance and updated orders accordingly - Yes  BP Readings from Last 3 Encounters:   01/10/19 104/60   11/08/18 (!) 83/53   10/12/18 113/68    Wt Readings from Last 3 Encounters:   01/10/19 55.2 kg (121 lb 12.8 oz)   11/08/18 55 kg (121 lb 3.2 oz)   10/12/18 54.9 kg (121 lb)                    Mammogram not appropriate for this patient based on age.  Mammo discussed, not appropriate for or " "declined by this patient.    Pertinent mammograms are reviewed under the imaging tab.  History of abnormal Pap smear:   NO - age 30-65 PAP every 5 years with negative HPV co-testing recommended  Last 3 Pap and HPV Results:   PAP / HPV 8/20/2013 8/2/2011 6/22/2010   PAP NIL NIL NIL     PAP / HPV 8/20/2013 8/2/2011 6/22/2010   PAP NIL NIL NIL     Reviewed and updated as needed this visit by clinical staff  Tobacco  Allergies  Meds  Med Hx  Surg Hx  Fam Hx  Soc Hx        Reviewed and updated as needed this visit by Provider            ROS:  CONSTITUTIONAL: NEGATIVE for fever, chills, change in weight  INTEGUMENTARU/SKIN: NEGATIVE for worrisome rashes, moles or lesions  EYES: NEGATIVE for vision changes or irritation  ENT: NEGATIVE for ear, mouth and throat problems  RESP: NEGATIVE for significant cough or SOB  BREAST: NEGATIVE for masses, tenderness or discharge  CV: NEGATIVE for chest pain, palpitations or peripheral edema  GI: NEGATIVE for nausea, abdominal pain, heartburn, or change in bowel habits  : NEGATIVE for unusual urinary or vaginal symptoms. Periods are regular.  MUSCULOSKELETAL: NEGATIVE for significant arthralgias or myalgia  NEURO: dizziness, exercise intolerance like she used to have  PSYCHIATRIC: NEGATIVE for changes in mood or affect    OBJECTIVE:   /60 (BP Location: Left arm, Patient Position: Sitting, Cuff Size: Adult Regular)   Pulse 69   Temp 98.6  F (37  C) (Tympanic)   Resp 16   Ht 1.556 m (5' 1.25\")   Wt 55.2 kg (121 lb 12.8 oz)   LMP 01/06/2018   Breastfeeding? No   BMI 22.83 kg/m    EXAM:  GENERAL: healthy, alert and no distress  EYES: Eyes grossly normal to inspection, PERRL and conjunctivae and sclerae normal  HENT: normal cephalic/atraumatic and oral mucous membranes moist  NECK: no adenopathy, no asymmetry, masses, or scars and thyroid normal to palpation  RESP: lungs clear to auscultation - no rales, rhonchi or wheezes  BREAST: normal without masses, tenderness or " nipple discharge and no palpable axillary masses or adenopathy  CV: regular rate and rhythm, normal S1 S2, no S3 or S4, no murmur, click or rub, no peripheral edema and peripheral pulses strong  ABDOMEN: soft, nontender, no hepatosplenomegaly, no masses and bowel sounds normal   (female): normal female external genitalia, normal urethral meatus, vaginal mucosa pink, moist, well rugated, and normal cervix/adnexa/uterus without masses or discharge  MS: no gross musculoskeletal defects noted, no edema  SKIN: no suspicious lesions or rashes  NEURO: Normal strength and tone, mentation intact and speech normal  PSYCH: mentation appears normal, affect normal/bright    Diagnostic Test Results:  Results for orders placed or performed in visit on 01/10/19   TSH with free T4 reflex   Result Value Ref Range    TSH 2.34 0.40 - 4.00 mU/L   Chlamydia trachomatis PCR   Result Value Ref Range    Specimen Description Cervix     Chlamydia Trachomatis PCR Negative NEG^Negative   Neisseria gonorrhoeae PCR   Result Value Ref Range    Specimen Descrip Cervix     N Gonorrhea PCR Negative NEG^Negative       ASSESSMENT/PLAN:   1. Well female exam with routine gynecological exam  Accepts STI screening.  Pap smear done today.  Mammogram not indicated today due to age.  Colonoscopy not indicated today due to age.  Labs not due today.  Immunizations up to date.  Discussed weight control, activity, and aging gracefully.  - Pap imaged thin layer screen with HPV - recommended age 30 - 65 years (select HPV order below)  - HPV High Risk Types DNA Cervical  - Chlamydia trachomatis PCR  - Neisseria gonorrhoeae PCR  - TSH with free T4 reflex    2. Dizziness, decreased exercise tolerance  Discussed s/s of vasovagal episode.  She thinks it was more than this.  We discussed low blood pressure and its effect.  Will check thyroid and she would also like a cardiac work up to rule out something more is going on.  Start with event monitor, can do stress test  "if nothing comes of the event monitor  - Cardiac Event Monitor - Peds/Adult; Future  - Exercise Stress test; Future  - TSH with free T4 reflex    Total time spent discussing dx #2 was 10 minutes; greater than 50% of time was spent in counseling and/or coordination of care, not including time spent on annual exam.      COUNSELING:   Reviewed preventive health counseling, as reflected in patient instructions    BP Readings from Last 1 Encounters:   01/10/19 104/60     Estimated body mass index is 22.83 kg/m  as calculated from the following:    Height as of this encounter: 1.556 m (5' 1.25\").    Weight as of this encounter: 55.2 kg (121 lb 12.8 oz).           reports that  has never smoked. she has never used smokeless tobacco.      Counseling Resources:  ATP IV Guidelines  Pooled Cohorts Equation Calculator  Breast Cancer Risk Calculator  FRAX Risk Assessment  ICSI Preventive Guidelines  Dietary Guidelines for Americans, 2010  USDA's MyPlate  ASA Prophylaxis  Lung CA Screening    Natasha Waite MD  [facility]  "

## 2019-01-11 LAB
C TRACH DNA SPEC QL NAA+PROBE: NEGATIVE
N GONORRHOEA DNA SPEC QL NAA+PROBE: NEGATIVE
SPECIMEN SOURCE: NORMAL
SPECIMEN SOURCE: NORMAL

## 2019-01-12 NOTE — RESULT ENCOUNTER NOTE
Thyroid is normal.  Gonorrhea and chlamydia are negative.    Natasha Waite MD, MPH  [facility]

## 2019-01-14 ENCOUNTER — TELEPHONE (OUTPATIENT)
Dept: ALLERGY | Facility: CLINIC | Age: 39
End: 2019-01-14

## 2019-01-14 LAB
COPATH REPORT: NORMAL
PAP: NORMAL

## 2019-01-14 NOTE — TELEPHONE ENCOUNTER
From new encounter.    Reason for Call:  Other appointment     Detailed comments: Pt wants to schedule allergy patch testing, please call after 2:20 today  Phone Number Patient can be reached at: Cell number on file:        Telephone Information:   Mobile [phone]         Best Time: today     Can we leave a detailed message on this number? YES     Call taken on 1/14/2019 at 9:16 AM by Lakisha Kilgore

## 2019-01-14 NOTE — TELEPHONE ENCOUNTER
Called and spoke with pt regarding scheduling. Scheduling completed.     No further questions.   Clarice SALDAÑA   Allergy RN

## 2019-01-14 NOTE — TELEPHONE ENCOUNTER
Reason for Call:  Other appointment    Detailed comments: Pt wants to schedule allergy patch testing, please call after 2:20 today  Phone Number Patient can be reached at: Cell number on file:    Telephone Information:   Mobile [phone]       Best Time: today    Can we leave a detailed message on this number? YES    Call taken on 1/14/2019 at 9:16 AM by Lakisha Kilgore

## 2019-01-15 LAB
FINAL DIAGNOSIS: NORMAL
HPV HR 12 DNA CVX QL NAA+PROBE: NEGATIVE
HPV16 DNA SPEC QL NAA+PROBE: NEGATIVE
HPV18 DNA SPEC QL NAA+PROBE: NEGATIVE
SPECIMEN DESCRIPTION: NORMAL
SPECIMEN SOURCE CVX/VAG CYTO: NORMAL

## 2019-01-28 ENCOUNTER — OFFICE VISIT (OUTPATIENT)
Dept: ALLERGY | Facility: CLINIC | Age: 39
End: 2019-01-28
Payer: COMMERCIAL

## 2019-01-28 VITALS
DIASTOLIC BLOOD PRESSURE: 75 MMHG | RESPIRATION RATE: 16 BRPM | SYSTOLIC BLOOD PRESSURE: 100 MMHG | BODY MASS INDEX: 22.85 KG/M2 | WEIGHT: 121.91 LBS | OXYGEN SATURATION: 99 % | HEART RATE: 85 BPM | TEMPERATURE: 97.7 F

## 2019-01-28 DIAGNOSIS — L30.9 DERMATITIS: Primary | ICD-10-CM

## 2019-01-28 PROCEDURE — 95044 PATCH/APPLICATION TESTS: CPT | Performed by: ALLERGY & IMMUNOLOGY

## 2019-01-28 PROCEDURE — 99207 ZZC DROP WITH A PROCEDURE: CPT | Performed by: ALLERGY & IMMUNOLOGY

## 2019-01-28 ASSESSMENT — ENCOUNTER SYMPTOMS
ACTIVITY CHANGE: 0
EYE ITCHING: 0
JOINT SWELLING: 0
RHINORRHEA: 0
EYE DISCHARGE: 0
CHILLS: 0
CHEST TIGHTNESS: 0
ADENOPATHY: 0
ARTHRALGIAS: 0
COUGH: 0
NAUSEA: 0
WHEEZING: 0
EYE REDNESS: 0
VOMITING: 0
MYALGIAS: 0
FEVER: 0
SHORTNESS OF BREATH: 0
FACIAL SWELLING: 0
SINUS PRESSURE: 0
HEADACHES: 0
DIARRHEA: 0

## 2019-01-28 NOTE — PROGRESS NOTES
SUBJECTIVE:                                                                   Lorraine Huston is a 38 year old female presenting today to our Allergy Clinic at  [facility], for patch test application, TRUE test.    Patient Active Problem List   Diagnosis     CARDIOVASCULAR SCREENING; LDL GOAL LESS THAN 160     24 hr info given       Past Medical History:   Diagnosis Date     Infertility     clomid pregnancy      Problem (# of Occurrences) Relation (Name,Age of Onset)    Cancer (1) Maternal Grandfather: lung    Deep Vein Thrombosis (1) Paternal Grandmother    Diabetes (1) Maternal Grandmother    Heart Disease (1) Father    Hypertension (2) Father, Paternal Grandmother        Past Surgical History:   Procedure Laterality Date     C/SECTION, LOW TRANSVERSE  09    , Low Transverse     Social History     Socioeconomic History     Marital status:      Spouse name: None     Number of children: None     Years of education: None     Highest education level: None   Social Needs     Financial resource strain: None     Food insecurity - worry: None     Food insecurity - inability: None     Transportation needs - medical: None     Transportation needs - non-medical: None   Occupational History     Occupation: Paraprofessional   Tobacco Use     Smoking status: Never Smoker     Smokeless tobacco: Never Used   Substance and Sexual Activity     Alcohol use: No     Drug use: No     Sexual activity: Yes     Partners: Male     Birth control/protection: Condom, Male Surgical     Comment: vasectomy   Other Topics Concern      Service No     Blood Transfusions No     Caffeine Concern No     Occupational Exposure No     Hobby Hazards Yes     Comment: down hill ski     Sleep Concern No     Stress Concern No     Weight Concern No     Special Diet Yes     Comment: eat healthy (not a milk drinker drink calcium OJ     Back Care Yes     Comment: Left upper and Left shoulder     Exercise Yes      Bike Helmet Not Asked     Comment: N/A     Seat Belt Yes     Self-Exams Yes     Parent/sibling w/ CABG, MI or angioplasty before 65F 55M? No   Social History Narrative    November 8, 2018    ENVIRONMENTAL HISTORY: The family [place] 8 [place] The home is heated with a forced air. They do have central air conditioning. The patient's bedroom is furnished with carpeting in bedroom. No pets inside the house. There is no history of cockroach or mice infestation. There are no smokers in the house.  The house does not have a damp basement.            Review of Systems   Constitutional: Negative for activity change, chills and fever.   HENT: Negative for congestion, dental problem, ear pain, facial swelling, nosebleeds, postnasal drip, rhinorrhea, sinus pressure and sneezing.    Eyes: Negative for discharge, redness and itching.   Respiratory: Negative for cough, chest tightness, shortness of breath and wheezing.    Cardiovascular: Negative for chest pain.   Gastrointestinal: Negative for diarrhea, nausea and vomiting.   Musculoskeletal: Negative for arthralgias, joint swelling and myalgias.   Skin: Negative for rash.   Neurological: Negative for headaches.   Hematological: Negative for adenopathy.   Psychiatric/Behavioral: Negative for behavioral problems and self-injury.           Current Outpatient Medications:      Ascorbic Acid (VITAMIN C PO), , Disp: , Rfl:      Multiple Vitamins-Minerals (MULTIVITAMIN & MINERAL PO), , Disp: , Rfl:      cetirizine (ZYRTEC) 10 MG tablet, Take 1 tablet (10 mg) by mouth every evening (Patient not taking: Reported on 1/28/2019), Disp: 30 tablet, Rfl: 1     desonide (DESOWEN) 0.05 % cream, Apply sparingly to affected area two times daily as needed up to 14 days (Patient not taking: Reported on 1/10/2019), Disp: 15 g, Rfl: 0     hydrOXYzine (ATARAX) 25 MG tablet, Take 1-2 tablets (25-50 mg) by mouth nightly as needed for itching (Patient not taking: Reported  on 11/8/2018), Disp: 30 tablet, Rfl: 1     loratadine (CLARITIN) 10 MG tablet, Take 1 tablet (10 mg) by mouth daily (Patient not taking: Reported on 11/8/2018), Disp: 30 tablet, Rfl: 0     ranitidine (ZANTAC) 300 MG tablet, Take 1 tablet (300 mg) by mouth At Bedtime (Patient not taking: Reported on 11/8/2018), Disp: 30 tablet, Rfl: 1     triamcinolone (KENALOG) 0.1 % ointment, Apply sparingly to affected area two times daily as needed but not more than 14 days in a row. Spare face, armpits, neck, and groin. (Patient not taking: Reported on 1/10/2019), Disp: 80 g, Rfl: 0  Immunization History   Administered Date(s) Administered     Influenza (H1N1) 11/04/2009     Influenza (IIV3) PF 09/02/2009, 10/25/2011     Influenza Intranasal Vaccine 11/27/2012     Influenza Intranasal Vaccine 4 valent 10/23/2014     Influenza Vaccine IM 3yrs+ 4 Valent IIV4 10/19/2016, 12/21/2017, 10/01/2018     Mantoux Tuberculin Skin Test 12/22/2014     TD (ADULT, 7+) 10/10/2004     TDAP Vaccine (Adacel) 08/16/2012     Allergies   Allergen Reactions     Hydrocodone      OBJECTIVE:                                                                 /75 (BP Location: Left arm, Patient Position: Sitting, Cuff Size: Adult Regular)   Pulse 85   Temp 97.7  F (36.5  C) (Tympanic)   Resp 16   Wt 55.3 kg (121 lb 14.6 oz)   SpO2 99%   BMI 22.85 kg/m          Physical Exam   Constitutional: No distress.   HENT:   Head: Normocephalic and atraumatic.   Eyes: Right eye exhibits no discharge. Left eye exhibits no discharge.   Pulmonary/Chest: Effort normal. No respiratory distress.   Musculoskeletal: Normal range of motion.   Skin: She is not diaphoretic.   Psychiatric: She has a normal mood and affect. Her behavior is normal.   Nursing note and vitals reviewed.        ASSESSMENT/PLAN:    No problems updated.  Problem List Items Addressed This Visit     None      Visit Diagnoses     Dermatitis    -  Primary  Patch test applied.   Discussed the  instructions.    Relevant Orders    ALLERGY PATCH TESTS [58451] (Completed)          Return in about 2 days (around 1/30/2019), or if symptoms worsen or fail to improve.    Thank you for allowing us to participate in the care of this patient. Please feel free to contact us if there are any questions or concerns about the patient.    Disclaimer: This note consists of symbols derived from keyboarding, dictation and/or voice recognition software. As a result, there may be errors in the script that have gone undetected. Please consider this when interpreting information found in this chart.    Al Ritter MD, FACAAI  Allergy, Asthma and Immunology  [place], [place] and [place]

## 2019-01-28 NOTE — NURSING NOTE
Pre-Test Instructions for patch testing:  Is the patient wearing comfortable clothing?Yes  Has the patient taken any oral corticosteriods in the last 2 weeks?No  Has the patient applied any skin lotions or medication to test area today?No    Was the patient informed of the following prior to patch testing:  How long will I have to wear the patch testing? (2 days) Yes  What activities should I avoid while wearing the patch test? ( do not get wet, avoid vigorous exercise and sunbathing which may cause sweating) Yes  What should I do if my skin itches or burns? ( try not to itch, this is a common side effect) Yes  When will my patch test results be known? (48-72 hours after placement)Yes  What is my doctor looking for? ( small skin rash with swelling, redness and tiny blisters) Yes  Inform patient that it is important to keep all appointments. Yes    TRUE TEST Patches placed: 1/28/2019 at 1700

## 2019-01-28 NOTE — LETTER
2019         RE: Lorraine Huston  [id number] [facility] [id number]        Dear Colleague,    Thank you for referring your patient, Lorraine Huston, to the [facility]. Please see a copy of my visit note below.    SUBJECTIVE:                                                                   Lorraine Huston is a 38 year old female presenting today to our Allergy Clinic at  [facility], for patch test application, TRUE test.    Patient Active Problem List   Diagnosis     CARDIOVASCULAR SCREENING; LDL GOAL LESS THAN 160     24 hr info given       Past Medical History:   Diagnosis Date     Infertility     clomid pregnancy      Problem (# of Occurrences) Relation (Name,Age of Onset)    Cancer (1) Maternal Grandfather: lung    Deep Vein Thrombosis (1) Paternal Grandmother    Diabetes (1) Maternal Grandmother    Heart Disease (1) Father    Hypertension (2) Father, Paternal Grandmother        Past Surgical History:   Procedure Laterality Date     C/SECTION, LOW TRANSVERSE  09    , Low Transverse     Social History     Socioeconomic History     Marital status:      Spouse name: None     Number of children: None     Years of education: None     Highest education level: None   Social Needs     Financial resource strain: None     Food insecurity - worry: None     Food insecurity - inability: None     Transportation needs - medical: None     Transportation needs - non-medical: None   Occupational History     Occupation: Paraprofessional   Tobacco Use     Smoking status: Never Smoker     Smokeless tobacco: Never Used   Substance and Sexual Activity     Alcohol use: No     Drug use: No     Sexual activity: Yes     Partners: Male     Birth control/protection: Condom, Male Surgical     Comment: vasectomy   Other Topics Concern      Service No     Blood Transfusions No     Caffeine Concern No     Occupational Exposure No     Hobby Hazards Yes     Comment: down  [facility]     Sleep Concern No     Stress Concern No     Weight Concern No     Special Diet Yes     Comment: eat healthy (not a milk drinker drink calcium OJ     Back Care Yes     Comment: Left upper and Left shoulder     Exercise Yes     Bike Helmet Not Asked     Comment: N/A     Seat Belt Yes     Self-Exams Yes     Parent/sibling w/ CABG, MI or angioplasty before 65F 55M? No   Social History Narrative    November 8, 2018    ENVIRONMENTAL HISTORY: The family [place] an [place] The home is heated with a forced air. They do have central air conditioning. The patient's bedroom is furnished with carpeting in bedroom. No pets inside the house. There is no history of cockroach or mice infestation. There are no smokers in the house.  The house does not have a damp basement.            Review of Systems   Constitutional: Negative for activity change, chills and fever.   HENT: Negative for congestion, dental problem, ear pain, facial swelling, nosebleeds, postnasal drip, rhinorrhea, sinus pressure and sneezing.    Eyes: Negative for discharge, redness and itching.   Respiratory: Negative for cough, chest tightness, shortness of breath and wheezing.    Cardiovascular: Negative for chest pain.   Gastrointestinal: Negative for diarrhea, nausea and vomiting.   Musculoskeletal: Negative for arthralgias, joint swelling and myalgias.   Skin: Negative for rash.   Neurological: Negative for headaches.   Hematological: Negative for adenopathy.   Psychiatric/Behavioral: Negative for behavioral problems and self-injury.           Current Outpatient Medications:      Ascorbic Acid (VITAMIN C PO), , Disp: , Rfl:      Multiple Vitamins-Minerals (MULTIVITAMIN & MINERAL PO), , Disp: , Rfl:      cetirizine (ZYRTEC) 10 MG tablet, Take 1 tablet (10 mg) by mouth every evening (Patient not taking: Reported on 1/28/2019), Disp: 30 tablet, Rfl: 1     desonide (DESOWEN) 0.05 % cream, Apply sparingly to affected area  two times daily as needed up to 14 days (Patient not taking: Reported on 1/10/2019), Disp: 15 g, Rfl: 0     hydrOXYzine (ATARAX) 25 MG tablet, Take 1-2 tablets (25-50 mg) by mouth nightly as needed for itching (Patient not taking: Reported on 11/8/2018), Disp: 30 tablet, Rfl: 1     loratadine (CLARITIN) 10 MG tablet, Take 1 tablet (10 mg) by mouth daily (Patient not taking: Reported on 11/8/2018), Disp: 30 tablet, Rfl: 0     ranitidine (ZANTAC) 300 MG tablet, Take 1 tablet (300 mg) by mouth At Bedtime (Patient not taking: Reported on 11/8/2018), Disp: 30 tablet, Rfl: 1     triamcinolone (KENALOG) 0.1 % ointment, Apply sparingly to affected area two times daily as needed but not more than 14 days in a row. Spare face, armpits, neck, and groin. (Patient not taking: Reported on 1/10/2019), Disp: 80 g, Rfl: 0  Immunization History   Administered Date(s) Administered     Influenza (H1N1) 11/04/2009     Influenza (IIV3) PF 09/02/2009, 10/25/2011     Influenza Intranasal Vaccine 11/27/2012     Influenza Intranasal Vaccine 4 valent 10/23/2014     Influenza Vaccine IM 3yrs+ 4 Valent IIV4 10/19/2016, 12/21/2017, 10/01/2018     Mantoux Tuberculin Skin Test 12/22/2014     TD (ADULT, 7+) 10/10/2004     TDAP Vaccine (Adacel) 08/16/2012     Allergies   Allergen Reactions     Hydrocodone      OBJECTIVE:                                                                 /75 (BP Location: Left arm, Patient Position: Sitting, Cuff Size: Adult Regular)   Pulse 85   Temp 97.7  F (36.5  C) (Tympanic)   Resp 16   Wt 55.3 kg (121 lb 14.6 oz)   SpO2 99%   BMI 22.85 kg/m           Physical Exam   Constitutional: No distress.   HENT:   Head: Normocephalic and atraumatic.   Eyes: Right eye exhibits no discharge. Left eye exhibits no discharge.   Pulmonary/Chest: Effort normal. No respiratory distress.   Musculoskeletal: Normal range of motion.   Skin: She is not diaphoretic.   Psychiatric: She has a normal mood and affect. Her  behavior is normal.   Nursing note and vitals reviewed.        ASSESSMENT/PLAN:    No problems updated.  Problem List Items Addressed This Visit     None      Visit Diagnoses     Dermatitis    -  Primary  Patch test applied.   Discussed the instructions.    Relevant Orders    ALLERGY PATCH TESTS [14190] (Completed)          Return in about 2 days (around 1/30/2019), or if symptoms worsen or fail to improve.    Thank you for allowing us to participate in the care of this patient. Please feel free to contact us if there are any questions or concerns about the patient.    Disclaimer: This note consists of symbols derived from keyboarding, dictation and/or voice recognition software. As a result, there may be errors in the script that have gone undetected. Please consider this when interpreting information found in this chart.    Al Ritter MD, [facility]  Allergy, Asthma and Immunology  [place], [place] and [place]      Again, thank you for allowing me to participate in the care of your patient.        Sincerely,        Al Ritter MD

## 2019-01-30 ENCOUNTER — ALLIED HEALTH/NURSE VISIT (OUTPATIENT)
Dept: ALLERGY | Facility: CLINIC | Age: 39
End: 2019-01-30
Payer: COMMERCIAL

## 2019-01-30 DIAGNOSIS — L30.9 DERMATITIS: Primary | ICD-10-CM

## 2019-01-30 PROCEDURE — 99207 ZZC NO CHARGE LOS: CPT

## 2019-01-30 NOTE — PROGRESS NOTES
SUBJECTIVE:                                                                 Lorraine Huston is a 38 year old female presenting today to our Allergy Clinic at  [facility], for patch test removal ( TRUE test) and first reading.    Patient Active Problem List   Diagnosis     CARDIOVASCULAR SCREENING; LDL GOAL LESS THAN 160     24 hr info given       Past Medical History:   Diagnosis Date     Infertility     clomid pregnancy      Problem (# of Occurrences) Relation (Name,Age of Onset)    Cancer (1) Maternal Grandfather: lung    Deep Vein Thrombosis (1) Paternal Grandmother    Diabetes (1) Maternal Grandmother    Heart Disease (1) Father    Hypertension (2) Father, Paternal Grandmother        Past Surgical History:   Procedure Laterality Date     C/SECTION, LOW TRANSVERSE  09    , Low Transverse     Social History     Socioeconomic History     Marital status:      Spouse name: Not on file     Number of children: Not on file     Years of education: Not on file     Highest education level: Not on file   Social Needs     Financial resource strain: Not on file     Food insecurity - worry: Not on file     Food insecurity - inability: Not on file     Transportation needs - medical: Not on file     Transportation needs - non-medical: Not on file   Occupational History     Occupation: Paraprofessional   Tobacco Use     Smoking status: Never Smoker     Smokeless tobacco: Never Used   Substance and Sexual Activity     Alcohol use: No     Drug use: No     Sexual activity: Yes     Partners: Male     Birth control/protection: Condom, Male Surgical     Comment: vasectomy   Other Topics Concern      Service No     Blood Transfusions No     Caffeine Concern No     Occupational Exposure No     Hobby Hazards Yes     Comment: down hill ski     Sleep Concern No     Stress Concern No     Weight Concern No     Special Diet Yes     Comment: eat healthy (not a milk drinker drink calcium OJ      Back Care Yes     Comment: Left upper and Left shoulder     Exercise Yes     Bike Helmet Not Asked     Comment: N/A     Seat Belt Yes     Self-Exams Yes     Parent/sibling w/ CABG, MI or angioplasty before 65F 55M? No   Social History Narrative    November 8, 2018    ENVIRONMENTAL HISTORY: The family [place] an 8 year old [place]. The home is heated with a forced air. They do have central air conditioning. The patient's bedroom is furnished with carpeting in bedroom. No pets inside the house. There is no history of cockroach or mice infestation. There are no smokers in the house.  The house does not have a damp basement.            Review of Systems        Current Outpatient Medications:      Ascorbic Acid (VITAMIN C PO), , Disp: , Rfl:      cetirizine (ZYRTEC) 10 MG tablet, Take 1 tablet (10 mg) by mouth every evening (Patient not taking: Reported on 1/28/2019), Disp: 30 tablet, Rfl: 1     desonide (DESOWEN) 0.05 % cream, Apply sparingly to affected area two times daily as needed up to 14 days (Patient not taking: Reported on 1/10/2019), Disp: 15 g, Rfl: 0     hydrOXYzine (ATARAX) 25 MG tablet, Take 1-2 tablets (25-50 mg) by mouth nightly as needed for itching (Patient not taking: Reported on 11/8/2018), Disp: 30 tablet, Rfl: 1     loratadine (CLARITIN) 10 MG tablet, Take 1 tablet (10 mg) by mouth daily (Patient not taking: Reported on 11/8/2018), Disp: 30 tablet, Rfl: 0     Multiple Vitamins-Minerals (MULTIVITAMIN & MINERAL PO), , Disp: , Rfl:      ranitidine (ZANTAC) 300 MG tablet, Take 1 tablet (300 mg) by mouth At Bedtime (Patient not taking: Reported on 11/8/2018), Disp: 30 tablet, Rfl: 1     triamcinolone (KENALOG) 0.1 % ointment, Apply sparingly to affected area two times daily as needed but not more than 14 days in a row. Spare face, armpits, neck, and groin. (Patient not taking: Reported on 1/10/2019), Disp: 80 g, Rfl: 0  Immunization History   Administered Date(s) Administered      Influenza (H1N1) 11/04/2009     Influenza (IIV3) PF 09/02/2009, 10/25/2011     Influenza Intranasal Vaccine 11/27/2012     Influenza Intranasal Vaccine 4 valent 10/23/2014     Influenza Vaccine IM 3yrs+ 4 Valent IIV4 10/19/2016, 12/21/2017, 10/01/2018     Mantoux Tuberculin Skin Test 12/22/2014     TD (ADULT, 7+) 10/10/2004     TDAP Vaccine (Adacel) 08/16/2012     Allergies   Allergen Reactions     Hydrocodone      OBJECTIVE:                                                                       Physical Exam          WORKUP:    PANEL 1.2    1. Nickel Sulfate:-  2. Wood Alcohols: -  3. Neomycin Sulfate:-  4. Potassium Dichromate: -  5. Zachariah Mix: -  6. Fragrance Mix:  -  7. Colophony:  -  8. Paraben Mix:  -  9. Negative Control:  -  10. Balsam of [place]:  -  11. Ethylendiamine/Dihydrochloride: -  12. Cobalt Dichloride:  ?    Panel 2.2    13. P-tert Butylphenol / Formaldehyde Resin: -  14. Epoxy Resin: -  15. Carba Mix:  -  16. Black Rubber Mix:-  17. Cl+Me-Isothiazolinone:-  18. Quaternium:-  19. Methyldibromo Glutaronitrile: -  20. P-Phenylenediamine: -  21. Formaldehyde: -  22. Mercapto Mix:  -  23. Thimerosal:  -  24.  Thiruam Mix: -    Panel 3.2    25. Diazolidnyl Urea: -  26. Quinoline Mix: -  27. Tixocortol-21: -  28. Gold Sodium Thisosulfate:  +  29. Imidazolidinyl Urea: -      30. Budesonide: -   31. Hydrocortisone: -    32. Mercaptobeothiazole:  -  33. Bacitractin  -  34. Parthenolide -  35. Disperse Blue 105 -  36. 2 Bromo-2-Nitropropane 1,3-diol  -      COMMENTS:      ASSESSMENT/PLAN:            Visit Diagnoses     Dermatitis    -  Primary            Return in about 2 days (around 2/1/2019), or if symptoms worsen or fail to improve.    Thank you for allowing us to participate in the care of this patient. Please feel free to contact us if there are any questions or concerns about the patient.    Disclaimer: This note consists of symbols derived from keyboarding, dictation and/or voice recognition software. As  a result, there may be errors in the script that have gone undetected. Please consider this when interpreting information found in this chart.    Al Ritter MD, [facility]  Allergy, Asthma and Immunology  [place], [place] and [place]

## 2019-02-01 ENCOUNTER — HOSPITAL ENCOUNTER (OUTPATIENT)
Dept: CARDIOLOGY | Facility: CLINIC | Age: 39
Discharge: HOME | End: 2019-02-01
Admitting: OBSTETRICS & GYNECOLOGY
Payer: COMMERCIAL

## 2019-02-01 ENCOUNTER — OFFICE VISIT (OUTPATIENT)
Dept: ALLERGY | Facility: CLINIC | Age: 39
End: 2019-02-01
Payer: COMMERCIAL

## 2019-02-01 VITALS
DIASTOLIC BLOOD PRESSURE: 58 MMHG | OXYGEN SATURATION: 99 % | SYSTOLIC BLOOD PRESSURE: 94 MMHG | TEMPERATURE: 98 F | HEART RATE: 81 BPM | RESPIRATION RATE: 16 BRPM

## 2019-02-01 DIAGNOSIS — R42 DIZZINESS: ICD-10-CM

## 2019-02-01 DIAGNOSIS — L30.9 DERMATITIS: Primary | ICD-10-CM

## 2019-02-01 PROCEDURE — 93272 ECG/REVIEW INTERPRET ONLY: CPT | Performed by: INTERNAL MEDICINE

## 2019-02-01 PROCEDURE — 93270 REMOTE 30 DAY ECG REV/REPORT: CPT

## 2019-02-01 PROCEDURE — 99212 OFFICE O/P EST SF 10 MIN: CPT | Performed by: ALLERGY & IMMUNOLOGY

## 2019-02-01 ASSESSMENT — ENCOUNTER SYMPTOMS
ADENOPATHY: 0
DIARRHEA: 0
FACIAL SWELLING: 0
RHINORRHEA: 0
EYE DISCHARGE: 0
SINUS PRESSURE: 0
COUGH: 0
SHORTNESS OF BREATH: 0
WHEEZING: 0
JOINT SWELLING: 0
NAUSEA: 0
CHILLS: 0
CHEST TIGHTNESS: 0
EYE ITCHING: 0
ARTHRALGIAS: 0
MYALGIAS: 0
ACTIVITY CHANGE: 0
FEVER: 0
HEADACHES: 0
VOMITING: 0
EYE REDNESS: 0

## 2019-02-01 NOTE — PROGRESS NOTES
SUBJECTIVE:                                                                   Lorraine Huston is a 38 year old female presenting today to our Allergy Clinic at  [facility] for patch test reading #2.      Patient Active Problem List   Diagnosis     CARDIOVASCULAR SCREENING; LDL GOAL LESS THAN 160     24 hr info given       Past Medical History:   Diagnosis Date     Infertility     clomid pregnancy      Problem (# of Occurrences) Relation (Name,Age of Onset)    Cancer (1) Maternal Grandfather: lung    Deep Vein Thrombosis (1) Paternal Grandmother    Diabetes (1) Maternal Grandmother    Heart Disease (1) Father    Hypertension (2) Father, Paternal Grandmother        Past Surgical History:   Procedure Laterality Date     C/SECTION, LOW TRANSVERSE  09    , Low Transverse     Social History     Socioeconomic History     Marital status:      Spouse name: None     Number of children: None     Years of education: None     Highest education level: None   Social Needs     Financial resource strain: None     Food insecurity - worry: None     Food insecurity - inability: None     Transportation needs - medical: None     Transportation needs - non-medical: None   Occupational History     Occupation: Paraprofessional   Tobacco Use     Smoking status: Never Smoker     Smokeless tobacco: Never Used   Substance and Sexual Activity     Alcohol use: No     Drug use: No     Sexual activity: Yes     Partners: Male     Birth control/protection: Condom, Male Surgical     Comment: vasectomy   Other Topics Concern      Service No     Blood Transfusions No     Caffeine Concern No     Occupational Exposure No     Hobby Hazards Yes     Comment: down hill ski     Sleep Concern No     Stress Concern No     Weight Concern No     Special Diet Yes     Comment: eat healthy (not a milk drinker drink calcium OJ     Back Care Yes     Comment: Left upper and Left shoulder     Exercise Yes     Bike Helmet  Not Asked     Comment: N/A     Seat Belt Yes     Self-Exams Yes     Parent/sibling w/ CABG, MI or angioplasty before 65F 55M? No   Social History Narrative    November 8, 2018    ENVIRONMENTAL HISTORY: The family [place] in an [place] The home is heated with a forced air. They do have central air conditioning. The patient's bedroom is furnished with carpeting in bedroom. No pets inside the house. There is no history of cockroach or mice infestation. There are no smokers in the house.  The house does not have a damp basement.            Review of Systems   Constitutional: Negative for activity change, chills and fever.   HENT: Negative for congestion, dental problem, ear pain, facial swelling, nosebleeds, postnasal drip, rhinorrhea, sinus pressure and sneezing.    Eyes: Negative for discharge, redness and itching.   Respiratory: Negative for cough, chest tightness, shortness of breath and wheezing.    Cardiovascular: Negative for chest pain.   Gastrointestinal: Negative for diarrhea, nausea and vomiting.   Musculoskeletal: Negative for arthralgias, joint swelling and myalgias.   Skin: Negative for rash.   Neurological: Negative for headaches.   Hematological: Negative for adenopathy.   Psychiatric/Behavioral: Negative for behavioral problems and self-injury.           Current Outpatient Medications:      Ascorbic Acid (VITAMIN C PO), , Disp: , Rfl:      cetirizine (ZYRTEC) 10 MG tablet, Take 1 tablet (10 mg) by mouth every evening (Patient not taking: Reported on 1/28/2019), Disp: 30 tablet, Rfl: 1     desonide (DESOWEN) 0.05 % cream, Apply sparingly to affected area two times daily as needed up to 14 days (Patient not taking: Reported on 1/10/2019), Disp: 15 g, Rfl: 0     hydrOXYzine (ATARAX) 25 MG tablet, Take 1-2 tablets (25-50 mg) by mouth nightly as needed for itching (Patient not taking: Reported on 11/8/2018), Disp: 30 tablet, Rfl: 1     loratadine (CLARITIN) 10 MG tablet, Take 1  tablet (10 mg) by mouth daily (Patient not taking: Reported on 11/8/2018), Disp: 30 tablet, Rfl: 0     Multiple Vitamins-Minerals (MULTIVITAMIN & MINERAL PO), , Disp: , Rfl:      ranitidine (ZANTAC) 300 MG tablet, Take 1 tablet (300 mg) by mouth At Bedtime (Patient not taking: Reported on 11/8/2018), Disp: 30 tablet, Rfl: 1     triamcinolone (KENALOG) 0.1 % ointment, Apply sparingly to affected area two times daily as needed but not more than 14 days in a row. Spare face, armpits, neck, and groin. (Patient not taking: Reported on 1/10/2019), Disp: 80 g, Rfl: 0  Immunization History   Administered Date(s) Administered     Influenza (H1N1) 11/04/2009     Influenza (IIV3) PF 09/02/2009, 10/25/2011     Influenza Intranasal Vaccine 11/27/2012     Influenza Intranasal Vaccine 4 valent 10/23/2014     Influenza Vaccine IM 3yrs+ 4 Valent IIV4 10/19/2016, 12/21/2017, 10/01/2018     Mantoux Tuberculin Skin Test 12/22/2014     TD (ADULT, 7+) 10/10/2004     TDAP Vaccine (Adacel) 08/16/2012     Allergies   Allergen Reactions     Hydrocodone      OBJECTIVE:                                                                 BP 94/58 (BP Location: Left arm, Patient Position: Sitting, Cuff Size: Adult Regular)   Pulse 81   Temp 98  F (36.7  C) (Tympanic)   Resp 16   SpO2 99%         Physical Exam   Constitutional: No distress.   HENT:   Head: Normocephalic and atraumatic.   Eyes: Right eye exhibits no discharge. Left eye exhibits no discharge.   Pulmonary/Chest: Effort normal. No respiratory distress.   Musculoskeletal: Normal range of motion.   Skin: She is not diaphoretic.   Psychiatric: She has a normal mood and affect. Her behavior is normal.   Nursing note and vitals reviewed.            WORKUP:   PANEL 1.2    1. Nickel Sulfate: -  2. Wood Alcohols: -  3. Neomycin Sulfate: -  4. Potassium Dichromate: -  5. Zachariah Mix: -  6. Fragrance Mix:  -  7. Colophony:  -  8. Paraben Mix:  -  9. Negative Control:  -  10. Balsam of [place]:   -  11. Ethylendiamine/Dihydrochloride: -  12. Cobalt Dichloride:  +    Panel 2.2    13. P-tert Butylphenol / Formaldehyde Resin: -  14. Epoxy Resin:  -  15. Carba Mix:  IR  16. Black Rubber Mix:  -  17. Cl+Me-Isothiazolinone:  IR  18. Quaternium:  -  19. Methyldibromo Glutaronitrile  -  20. P-Phenylenediamine:  -  21. Formaldehyde:  -  22. Mercapto Mix:  -  23. Thimerosal:  IR  24.  Thiruam Mix:  -    Panel 3.2    25. Diazolidnyl Urea:  -  26. Quinoline Mix  -  27. Tixocortol-21  -  28. Gold Sodium Thisosulfate:  +  29. Imidazolidinyl Urea:  -        30. Budesonide:  -    31. Hydrocortisone:  -    32. Mercaptobeothiazole:  -    33. Bacitractin  -   34. Parthenolide  -   35. Disperse Blue 105  -  36. 2 Bromo-2-Nitropropane 1,3-diol  -        COMMENTS:      ASSESSMENT/PLAN:    Visit Diagnoses     Dermatitis    -  Primary  The patient states that she noticed that in the past she had issues with gold jewelry.  However, she stopped wearing gold about 1 year ago.  So positive #28 is clinically relevant but for past.  She had irritant reaction to #17,Cl+Me-Isothiazolinone, which can be found in skin care products such as creams, lotions, moisturizers, attending products, hair care products, such as conditioners, shampoos, and coloring agents, and also laundry products and personal hygiene items such as detergents, fabric softeners, soaps, cleansers, bubble baths, and wipes.  Clinical relevance in regards to the rest of the antigens that were tested positive or IR, is unclear; however, we discussed avoidance measures in regards to all of them.              Return if symptoms worsen or fail to improve.    Thank you for allowing us to participate in the care of this patient. Please feel free to contact us if there are any questions or concerns about the patient.    Disclaimer: This note consists of symbols derived from keyboarding, dictation and/or voice recognition software. As a result, there may be errors in the script  that have gone undetected. Please consider this when interpreting information found in this chart.    Al Ritter MD, FACAAI  Allergy, Asthma and Immunology  [place], [place] and [place]

## 2019-02-01 NOTE — LETTER
2019         RE: Lorraine Huston  [id number] [address] [address]        Dear Colleague,    Thank you for referring your patient, Lorraine Huston, to the [facility]. Please see a copy of my visit note below.    SUBJECTIVE:                                                                   Lorraine Huston is a 38 year old female presenting today to our Allergy Clinic at  [facility] for patch test reading #2.      Patient Active Problem List   Diagnosis     CARDIOVASCULAR SCREENING; LDL GOAL LESS THAN 160     24 hr info given       Past Medical History:   Diagnosis Date     Infertility     clomid pregnancy      Problem (# of Occurrences) Relation (Name,Age of Onset)    Cancer (1) Maternal Grandfather: lung    Deep Vein Thrombosis (1) Paternal Grandmother    Diabetes (1) Maternal Grandmother    Heart Disease (1) Father    Hypertension (2) Father, Paternal Grandmother        Past Surgical History:   Procedure Laterality Date     C/SECTION, LOW TRANSVERSE  09    , Low Transverse     Social History     Socioeconomic History     Marital status:      Spouse name: None     Number of children: None     Years of education: None     Highest education level: None   Social Needs     Financial resource strain: None     Food insecurity - worry: None     Food insecurity - inability: None     Transportation needs - medical: None     Transportation needs - non-medical: None   Occupational History     Occupation: Paraprofessional   Tobacco Use     Smoking status: Never Smoker     Smokeless tobacco: Never Used   Substance and Sexual Activity     Alcohol use: No     Drug use: No     Sexual activity: Yes     Partners: Male     Birth control/protection: Condom, Male Surgical     Comment: vasectomy   Other Topics Concern      Service No     Blood Transfusions No     Caffeine Concern No     Occupational Exposure No     Hobby Hazards Yes     Comment: down hill ski      Sleep Concern No     Stress Concern No     Weight Concern No     Special Diet Yes     Comment: eat healthy (not a milk drinker drink calcium OJ     Back Care Yes     Comment: Left upper and Left shoulder     Exercise Yes     Bike Helmet Not Asked     Comment: N/A     Seat Belt Yes     Self-Exams Yes     Parent/sibling w/ CABG, MI or angioplasty before 65F 55M? No   Social History Narrative    November 8, 2018    ENVIRONMENTAL HISTORY: The family [place] an [place] The home is heated with a forced air. They do have central air conditioning. The patient's bedroom is furnished with carpeting in bedroom. No pets inside the house. There is no history of cockroach or mice infestation. There are no smokers in the house.  The house does not have a damp basement.            Review of Systems   Constitutional: Negative for activity change, chills and fever.   HENT: Negative for congestion, dental problem, ear pain, facial swelling, nosebleeds, postnasal drip, rhinorrhea, sinus pressure and sneezing.    Eyes: Negative for discharge, redness and itching.   Respiratory: Negative for cough, chest tightness, shortness of breath and wheezing.    Cardiovascular: Negative for chest pain.   Gastrointestinal: Negative for diarrhea, nausea and vomiting.   Musculoskeletal: Negative for arthralgias, joint swelling and myalgias.   Skin: Negative for rash.   Neurological: Negative for headaches.   Hematological: Negative for adenopathy.   Psychiatric/Behavioral: Negative for behavioral problems and self-injury.           Current Outpatient Medications:      Ascorbic Acid (VITAMIN C PO), , Disp: , Rfl:      cetirizine (ZYRTEC) 10 MG tablet, Take 1 tablet (10 mg) by mouth every evening (Patient not taking: Reported on 1/28/2019), Disp: 30 tablet, Rfl: 1     desonide (DESOWEN) 0.05 % cream, Apply sparingly to affected area two times daily as needed up to 14 days (Patient not taking: Reported on 1/10/2019), Disp:  15 g, Rfl: 0     hydrOXYzine (ATARAX) 25 MG tablet, Take 1-2 tablets (25-50 mg) by mouth nightly as needed for itching (Patient not taking: Reported on 11/8/2018), Disp: 30 tablet, Rfl: 1     loratadine (CLARITIN) 10 MG tablet, Take 1 tablet (10 mg) by mouth daily (Patient not taking: Reported on 11/8/2018), Disp: 30 tablet, Rfl: 0     Multiple Vitamins-Minerals (MULTIVITAMIN & MINERAL PO), , Disp: , Rfl:      ranitidine (ZANTAC) 300 MG tablet, Take 1 tablet (300 mg) by mouth At Bedtime (Patient not taking: Reported on 11/8/2018), Disp: 30 tablet, Rfl: 1     triamcinolone (KENALOG) 0.1 % ointment, Apply sparingly to affected area two times daily as needed but not more than 14 days in a row. Spare face, armpits, neck, and groin. (Patient not taking: Reported on 1/10/2019), Disp: 80 g, Rfl: 0  Immunization History   Administered Date(s) Administered     Influenza (H1N1) 11/04/2009     Influenza (IIV3) PF 09/02/2009, 10/25/2011     Influenza Intranasal Vaccine 11/27/2012     Influenza Intranasal Vaccine 4 valent 10/23/2014     Influenza Vaccine IM 3yrs+ 4 Valent IIV4 10/19/2016, 12/21/2017, 10/01/2018     Mantoux Tuberculin Skin Test 12/22/2014     TD (ADULT, 7+) 10/10/2004     TDAP Vaccine (Adacel) 08/16/2012     Allergies   Allergen Reactions     Hydrocodone      OBJECTIVE:                                                                 BP 94/58 (BP Location: Left arm, Patient Position: Sitting, Cuff Size: Adult Regular)   Pulse 81   Temp 98  F (36.7  C) (Tympanic)   Resp 16   SpO2 99%         Physical Exam   Constitutional: No distress.   HENT:   Head: Normocephalic and atraumatic.   Eyes: Right eye exhibits no discharge. Left eye exhibits no discharge.   Pulmonary/Chest: Effort normal. No respiratory distress.   Musculoskeletal: Normal range of motion.   Skin: She is not diaphoretic.   Psychiatric: She has a normal mood and affect. Her behavior is normal.   Nursing note and vitals reviewed.            WORKUP:    PANEL 1.2    1. Nickel Sulfate: -  2. Wood Alcohols: -  3. Neomycin Sulfate: -  4. Potassium Dichromate: -  5. Zachariah Mix: -  6. Fragrance Mix:  -  7. Colophony:  -  8. Paraben Mix:  -  9. Negative Control:  -  10. Balsam of [place]:  -  11. Ethylendiamine/Dihydrochloride: -  12. Cobalt Dichloride:  +    Panel 2.2    13. P-tert Butylphenol / Formaldehyde Resin: -  14. Epoxy Resin:  -  15. Carba Mix:  IR  16. Black Rubber Mix:  -  17. Cl+Me-Isothiazolinone:  IR  18. Quaternium:  -  19. Methyldibromo Glutaronitrile  -  20. P-Phenylenediamine:  -  21. Formaldehyde:  -  22. Mercapto Mix:  -  23. Thimerosal:  IR  24.  Thiruam Mix:  -    Panel 3.2    25. Diazolidnyl Urea:  -  26. Quinoline Mix  -  27. Tixocortol-21  -  28. Gold Sodium Thisosulfate:  +  29. Imidazolidinyl Urea:  -        30. Budesonide:  -    31. Hydrocortisone:  -    32. Mercaptobeothiazole:  -    33. Bacitractin  -   34. Parthenolide  -   35. Disperse Blue 105  -  36. 2 Bromo-2-Nitropropane 1,3-diol  -        COMMENTS:      ASSESSMENT/PLAN:    Visit Diagnoses     Dermatitis    -  Primary  The patient states that she noticed that in the past she had issues with gold jewelry.  However, she stopped wearing gold about 1 year ago.  So positive #28 is clinically relevant but for past.  She had irritant reaction to #17,Cl+Me-Isothiazolinone, which can be found in skin care products such as creams, lotions, moisturizers, attending products, hair care products, such as conditioners, shampoos, and coloring agents, and also laundry products and personal hygiene items such as detergents, fabric softeners, soaps, cleansers, bubble baths, and wipes.  Clinical relevance in regards to the rest of the antigens that were tested positive or IR, is unclear; however, we discussed avoidance measures in regards to all of them.              Return if symptoms worsen or fail to improve.    Thank you for allowing us to participate in the care of this patient. Please feel free to  contact us if there are any questions or concerns about the patient.    Disclaimer: This note consists of symbols derived from keyboarding, dictation and/or voice recognition software. As a result, there may be errors in the script that have gone undetected. Please consider this when interpreting information found in this chart.    Al Ritter MD, [facility]  Allergy, Asthma and Immunology  [place], [place] and [place]      Again, thank you for allowing me to participate in the care of your patient.        Sincerely,        Al Ritter MD

## 2019-02-15 ENCOUNTER — HOSPITAL ENCOUNTER (OUTPATIENT)
Dept: CARDIOLOGY | Facility: CLINIC | Age: 39
Discharge: HOME | End: 2019-02-15
Admitting: OBSTETRICS & GYNECOLOGY
Payer: COMMERCIAL

## 2019-02-15 DIAGNOSIS — R42 DIZZINESS: ICD-10-CM

## 2019-02-15 PROCEDURE — 93017 CV STRESS TEST TRACING ONLY: CPT

## 2019-02-15 PROCEDURE — 93018 CV STRESS TEST I&R ONLY: CPT | Performed by: INTERNAL MEDICINE

## 2019-02-15 PROCEDURE — 93016 CV STRESS TEST SUPVJ ONLY: CPT | Performed by: INTERNAL MEDICINE

## 2019-02-18 ENCOUNTER — TELEPHONE (OUTPATIENT)
Dept: OBGYN | Facility: CLINIC | Age: 39
End: 2019-02-18

## 2019-02-18 NOTE — TELEPHONE ENCOUNTER
Stress test results was delivered to the ob/gyn clinic and placed on Dr. Waite's desk.  Result was normal.    Marielena Laguerre  [facility] RN

## 2019-02-19 NOTE — RESULT ENCOUNTER NOTE
Please call with results and let her know they are normal.      The resting and stress EKGs were all normal, so evidence of a heart problem causing your dizziness.    Natasha Waite MD, MPH

## 2019-03-04 NOTE — TELEPHONE ENCOUNTER
Pt returns my call and she reports that she never received the results of her stress test.      Pt was advised that I would route this encounter to Dr. Waite and she requests that if we call her back to call after 2:30-3:00pm.  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    Marielena Laguerre  [facility] RN

## 2019-03-04 NOTE — TELEPHONE ENCOUNTER
Called pt left message to return call.  Need to inquire with pt if she received results of her stress test.    Marielena Laguerre  [facility] RN

## 2019-03-04 NOTE — TELEPHONE ENCOUNTER
Sorry she never got the message with her results; per the result note, Gracie from clinic left a message on 2/21 to call back to clinic for the results.  Here is my note below.  Please call and let the patient know it was normal:    Notes recorded by Natasha Waite MD on 2/19/2019 at 3:28 PM CST  Please call with results and let her know they are normal.      The resting and stress EKGs were all normal, no evidence of a heart problem causing your dizziness.     Natasha Waite MD, MPH

## 2019-03-04 NOTE — TELEPHONE ENCOUNTER
Pt notified of below.  Pt reports understanding.  Pt does not have further questions or concerns.    Gina Tejeda   [facility]  RN

## 2019-07-03 ENCOUNTER — OFFICE VISIT (OUTPATIENT)
Dept: FAMILY MEDICINE | Facility: CLINIC | Age: 39
End: 2019-07-03
Payer: COMMERCIAL

## 2019-07-03 VITALS
SYSTOLIC BLOOD PRESSURE: 126 MMHG | WEIGHT: 124 LBS | BODY MASS INDEX: 23.24 KG/M2 | RESPIRATION RATE: 16 BRPM | TEMPERATURE: 98.6 F | OXYGEN SATURATION: 100 % | HEART RATE: 73 BPM | DIASTOLIC BLOOD PRESSURE: 58 MMHG

## 2019-07-03 DIAGNOSIS — R07.0 THROAT PAIN: Primary | ICD-10-CM

## 2019-07-03 DIAGNOSIS — J30.2 SEASONAL ALLERGIC RHINITIS, UNSPECIFIED TRIGGER: ICD-10-CM

## 2019-07-03 LAB
DEPRECATED S PYO AG THROAT QL EIA: NORMAL
SPECIMEN SOURCE: NORMAL

## 2019-07-03 PROCEDURE — 99213 OFFICE O/P EST LOW 20 MIN: CPT | Performed by: NURSE PRACTITIONER

## 2019-07-03 PROCEDURE — 87081 CULTURE SCREEN ONLY: CPT | Performed by: NURSE PRACTITIONER

## 2019-07-03 PROCEDURE — 87880 STREP A ASSAY W/OPTIC: CPT | Performed by: NURSE PRACTITIONER

## 2019-07-03 RX ORDER — ALBUTEROL SULFATE 90 UG/1
2 AEROSOL, METERED RESPIRATORY (INHALATION) EVERY 6 HOURS
Qty: 1 INHALER | Refills: 0 | Status: SHIPPED | OUTPATIENT
Start: 2019-07-03 | End: 2019-07-25

## 2019-07-03 ASSESSMENT — PAIN SCALES - GENERAL: PAINLEVEL: NO PAIN (0)

## 2019-07-03 ASSESSMENT — ENCOUNTER SYMPTOMS
EYE DISCHARGE: 0
EYE ITCHING: 1
CHILLS: 0
FEVER: 0
DIARRHEA: 0
NAUSEA: 0
SINUS PRESSURE: 0
COUGH: 1
DIZZINESS: 0
WHEEZING: 0
RHINORRHEA: 0
SHORTNESS OF BREATH: 1
SORE THROAT: 1
FATIGUE: 0
DIAPHORESIS: 0
CONSTIPATION: 0
LIGHT-HEADEDNESS: 0
HEADACHES: 0

## 2019-07-03 NOTE — PROGRESS NOTES
Subjective     Lorraine Huston is a 39 year old female who presents to clinic today for the following health issues:    HPI     ENT Symptoms             Symptoms: cc Present Absent Comment   Fever/Chills   x    Fatigue   x    Muscle Aches   x    Eye Irritation  x  Some itching in the morning   Sneezing   x    Nasal Judd/Drg   x    Sinus Pressure/Pain   x    Loss of smell   x    Dental pain   x    Sore Throat  x  Off and on   Swollen Glands   x    Ear Pain/Fullness  x  Some popping with cough   Cough x x  Producing some clear sputum   Wheeze   x    Chest Pain   x    Shortness of breath  x  If coughing a lot   Rash   x    Other   x      Symptom duration:  3 weeks   Symptom severity:  mild   Treatments tried:  albuterol inhaler   Contacts:  no known exposure to illness but was at a [employer] gathering     Cough for the last 3 weeks. Will bring up clear phlegm. Has been on and off sick. Does not feel sick, sick. Is going to be leaving for [place] and on plane for 20+ hours. No fever that is aware of. Has still been able to work out and does not really feel sick. When is coughing ears will pop. Does use inhaler at home. Does feel like that helped some.     Current Outpatient Medications   Medication Sig Dispense Refill     albuterol (PROAIR HFA/PROVENTIL HFA/VENTOLIN HFA) 108 (90 BASE) MCG/ACT Inhaler Inhale 2 puffs into the lungs every 6 hours       albuterol (PROAIR HFA/PROVENTIL HFA/VENTOLIN HFA) 108 (90 Base) MCG/ACT inhaler Inhale 2 puffs into the lungs every 6 hours 1 Inhaler 0     Multiple Vitamins-Minerals (AIRBORNE GUMMIES PO)        Multiple Vitamins-Minerals (MULTIVITAMIN & MINERAL PO)        Allergies   Allergen Reactions     Hydrocodone        Reviewed and updated as needed this visit by Provider  Problems               Objective    /58 (BP Location: Left arm, Patient Position: Sitting, Cuff Size: Adult Regular)   Pulse 73   Temp 98.6  F (37  C) (Tympanic)   Resp 16   Wt 56.2 kg (124 lb)    LMP 06/03/2019   SpO2 100%   BMI 23.24 kg/m    Body mass index is 23.24 kg/m .          Assessment & Plan      Review of Systems   Constitutional: Negative for chills, diaphoresis, fatigue and fever.   HENT: Positive for ear pain and sore throat. Negative for ear discharge, hearing loss, rhinorrhea and sinus pressure.    Eyes: Positive for itching. Negative for discharge.   Respiratory: Positive for cough and shortness of breath. Negative for wheezing.    Gastrointestinal: Negative for constipation, diarrhea and nausea.   Skin: Negative for rash.   Neurological: Negative for dizziness, light-headedness and headaches.       Physical Exam   Constitutional: She appears well-developed.   HENT:   Right Ear: Tympanic membrane and external ear normal. Tympanic membrane is not erythematous. No middle ear effusion.   Left Ear: Tympanic membrane and external ear normal. Tympanic membrane is not erythematous.  No middle ear effusion.   Nose: Mucosal edema present. No rhinorrhea.   Cardiovascular: Normal rate, regular rhythm and normal heart sounds.   Pulmonary/Chest: Effort normal and breath sounds normal.   Abdominal: Soft. Bowel sounds are normal.   Neurological: She is alert.   Skin: Skin is warm and dry.   Psychiatric: She has a normal mood and affect.         1. Throat pain  - Rapid strep screen  - Beta strep group A culture    2. Seasonal allergic rhinitis, unspecified trigger  Treatment plan and medications reviewed and understood by patient.   Reviewed importance of avoidance of allergens.  Instructed to call or return for:   - trouble breathing   - sensation of throat tightness   - symptoms worsen or fail to resolve in 3 days   -Start OTC Claritin, Zyrtec, or Allegra   - albuterol (PROAIR HFA/PROVENTIL HFA/VENTOLIN HFA) 108 (90 Base) MCG/ACT inhaler; Inhale 2 puffs into the lungs every 6 hours  Dispense: 1 Inhaler; Refill: 0       Return in about 2 weeks (around 7/17/2019), or if symptoms worsen or fail to  improve.    JANNETTE [facility]

## 2019-07-04 LAB
BACTERIA SPEC CULT: NORMAL
SPECIMEN SOURCE: NORMAL

## 2019-07-25 DIAGNOSIS — J30.2 SEASONAL ALLERGIC RHINITIS, UNSPECIFIED TRIGGER: ICD-10-CM

## 2019-07-25 RX ORDER — ALBUTEROL SULFATE 90 UG/1
2 AEROSOL, METERED RESPIRATORY (INHALATION) EVERY 6 HOURS
Qty: 8.5 INHALER | Refills: 0 | Status: SHIPPED | OUTPATIENT
Start: 2019-07-25 | End: 2020-02-24

## 2019-07-25 NOTE — TELEPHONE ENCOUNTER
"Requested Prescriptions   Pending Prescriptions Disp Refills     albuterol (PROAIR HFA/PROVENTIL HFA/VENTOLIN HFA) 108 (90 Base) MCG/ACT inhaler [Pharmacy Med Name: ALBUTEROL HFA (PROAIR) INHALER] 8.5 Inhaler 0     Sig: INHALE 2 PUFFS INTO THE LUNGS EVERY 6 HOURS  Last Written Prescription Date:  07/03/2019 #1 x 0  Last filled 07/03/2019  Last office visit: 7/3/2019 ANTONIO Kingston   Future Office Visit:  None         Asthma Maintenance Inhalers - Anticholinergics Passed - 7/25/2019  1:31 AM        Passed - Patient is age 12 years or older        Passed - Recent (12 mo) or future (30 days) visit within the authorizing provider's specialty     Patient had office visit in the last 12 months or has a visit in the next 30 days with authorizing provider or within the authorizing provider's specialty.  See \"Patient Info\" tab in inbasket, or \"Choose Columns\" in Meds & Orders section of the refill encounter.              Passed - Medication is active on med list          "

## 2019-11-21 ENCOUNTER — OFFICE VISIT (OUTPATIENT)
Dept: FAMILY MEDICINE | Facility: CLINIC | Age: 39
End: 2019-11-21
Payer: COMMERCIAL

## 2019-11-21 VITALS
HEART RATE: 68 BPM | DIASTOLIC BLOOD PRESSURE: 60 MMHG | SYSTOLIC BLOOD PRESSURE: 94 MMHG | TEMPERATURE: 98.1 F | RESPIRATION RATE: 16 BRPM | OXYGEN SATURATION: 99 % | WEIGHT: 120 LBS | BODY MASS INDEX: 22.49 KG/M2

## 2019-11-21 DIAGNOSIS — R07.0 THROAT PAIN: Primary | ICD-10-CM

## 2019-11-21 DIAGNOSIS — J38.3 VOCAL CORD DYSFUNCTION: ICD-10-CM

## 2019-11-21 LAB
DEPRECATED S PYO AG THROAT QL EIA: NORMAL
SPECIMEN SOURCE: NORMAL

## 2019-11-21 PROCEDURE — 87081 CULTURE SCREEN ONLY: CPT | Performed by: NURSE PRACTITIONER

## 2019-11-21 PROCEDURE — 87880 STREP A ASSAY W/OPTIC: CPT | Performed by: NURSE PRACTITIONER

## 2019-11-21 PROCEDURE — 99213 OFFICE O/P EST LOW 20 MIN: CPT | Performed by: NURSE PRACTITIONER

## 2019-11-21 RX ORDER — METHYLPREDNISOLONE 4 MG
TABLET, DOSE PACK ORAL
Qty: 21 TABLET | Refills: 0 | Status: SHIPPED | OUTPATIENT
Start: 2019-11-21 | End: 2020-02-24

## 2019-11-21 ASSESSMENT — ENCOUNTER SYMPTOMS
SINUS PRESSURE: 0
SHORTNESS OF BREATH: 0
SORE THROAT: 1
DIZZINESS: 0
FATIGUE: 0
DIAPHORESIS: 0
NAUSEA: 0
DIARRHEA: 0
VOICE CHANGE: 1
EYE DISCHARGE: 0
LIGHT-HEADEDNESS: 0
HEADACHES: 0
FEVER: 1
RHINORRHEA: 0
COUGH: 1
CHEST TIGHTNESS: 1
CHILLS: 1
CONSTIPATION: 0
WHEEZING: 1

## 2019-11-21 ASSESSMENT — PAIN SCALES - GENERAL: PAINLEVEL: NO PAIN (1)

## 2019-11-21 NOTE — LETTER
November 22, 2019      Lorraine BALTAZAR Robel  [address] [address] [place] [address]        Dear Ms.Robel,    We are writing to inform you of your test results.    Normal/Negative      Resulted Orders   Rapid strep screen   Result Value Ref Range    Specimen Description Throat     Rapid Strep A Screen       NEGATIVE: No Group A streptococcal antigen detected by immunoassay, await culture report.   Beta strep group A culture   Result Value Ref Range    Specimen Description Throat     Culture Micro No beta hemolytic Streptococcus Group A isolated        If you have any questions or concerns, please call the clinic at the number listed above.       Sincerely,        Maria Guadalupe Kingston, APRN CNP/am

## 2019-11-21 NOTE — PROGRESS NOTES
Subjective     Lorraine Huston is a 39 year old female who presents to clinic today for the following health issues:    HPI     ENT Symptoms             Symptoms: cc Present Absent Comment   Fever/Chills  x  Felt like she had a fever yesterday   Fatigue   x    Muscle Aches   x    Eye Irritation  x  Yellow crust in the morning   Sneezing  x     Nasal Judd/Drg   x    Sinus Pressure/Pain   x    Loss of smell   x    Dental pain   x    Sore Throat  x  Sore throat and losing voice   Swollen Glands   x    Ear Pain/Fullness   x    Cough x x  Producing green sputum   Wheeze  x     Chest Pain  x  With full deep breath   Shortness of breath   x    Rash   x    Other   x      Symptom duration:  5 days   Symptom severity:  mild   Treatments tried:  Airborne   Contacts:  works in a school     Lost voice on Wed AM. No pain. Does hurt to breath in. Did have asthma as a kid. Did take some Airborne and does feel like that helped some. Wheezing only when is coughing. Feeling some tightness and pain in chest. Has been eating and drinking okay.     Current Outpatient Medications   Medication Sig Dispense Refill     albuterol (PROAIR HFA/PROVENTIL HFA/VENTOLIN HFA) 108 (90 Base) MCG/ACT inhaler INHALE 2 PUFFS INTO THE LUNGS EVERY 6 HOURS 8.5 Inhaler 0     methylPREDNISolone (MEDROL DOSEPAK) 4 MG tablet therapy pack Follow Package Directions 21 tablet 0     Multiple Vitamins-Minerals (AIRBORNE GUMMIES PO)        Multiple Vitamins-Minerals (MULTIVITAMIN & MINERAL PO)        Allergies   Allergen Reactions     Hydrocodone        Reviewed and updated as needed this visit by Provider               Objective    BP 94/60   Pulse 68   Temp 98.1  F (36.7  C) (Tympanic)   Resp 16   Wt 54.4 kg (120 lb)   LMP 11/06/2019   SpO2 99%   BMI 22.49 kg/m    Body mass index is 22.49 kg/m .          Assessment & Plan      Review of Systems   Constitutional: Positive for chills and fever. Negative for diaphoresis and fatigue.   HENT: Positive for  sneezing, sore throat and voice change. Negative for ear discharge, ear pain, hearing loss, rhinorrhea and sinus pressure.    Eyes: Positive for itching. Negative for discharge.   Respiratory: Positive for cough, chest tightness and wheezing. Negative for shortness of breath.    Gastrointestinal: Negative for constipation, diarrhea and nausea.   Skin: Negative for rash.   Neurological: Negative for dizziness, light-headedness and headaches.       Physical Exam  Vitals signs reviewed.   Constitutional:       Appearance: She is well-developed and well-groomed.   HENT:      Head: Normocephalic.      Jaw: There is normal jaw occlusion.      Right Ear: Tympanic membrane, ear canal and external ear normal. No middle ear effusion. Tympanic membrane is not erythematous.      Left Ear: Tympanic membrane, ear canal and external ear normal.  No middle ear effusion. Tympanic membrane is not erythematous.      Nose: Nose normal. No mucosal edema or rhinorrhea.      Mouth/Throat:      Lips: Pink.      Mouth: Mucous membranes are moist.      Pharynx: Posterior oropharyngeal erythema present.   Eyes:      General: Lids are normal.      Conjunctiva/sclera: Conjunctivae normal.      Pupils: Pupils are equal, round, and reactive to light.   Cardiovascular:      Rate and Rhythm: Normal rate and regular rhythm.      Heart sounds: Normal heart sounds, S1 normal and S2 normal.   Pulmonary:      Effort: Pulmonary effort is normal.      Breath sounds: Normal breath sounds.   Abdominal:      General: Bowel sounds are normal.      Palpations: Abdomen is soft.   Lymphadenopathy:      Cervical: Cervical adenopathy present.      Right cervical: Superficial cervical adenopathy present.      Left cervical: Superficial cervical adenopathy present.   Skin:     General: Skin is warm and dry.      Findings: No rash.   Neurological:      Mental Status: She is alert.           1. Throat pain  - Rapid strep screen  - Beta strep group A culture    2.  Vocal cord dysfunction  Try to rest voice over the next few days-work note given.  Patient is a teacher.  Educated on use of medication.  Notify if no improvement and may need to refer to ENT.  Push fluids.  - methylPREDNISolone (MEDROL DOSEPAK) 4 MG tablet therapy pack; Follow Package Directions  Dispense: 21 tablet; Refill: 0       Return in about 1 week (around 11/28/2019), or if symptoms worsen or fail to improve.    JANNETTE Vo [facility]

## 2019-11-21 NOTE — LETTER
[facility]  [address] [address]  Phone: [phone]    November 21, 2019        Lorraine Huston  [id number] [place] [address]  [place] [address]          To whom it may concern:    RE: Lorraine Huston    Patient was seen and treated today at our clinic. Please excuse her from work today 11/21/19 and tomorrow 11/22/19. She may return to work on 11/25/19 without restriction.     Please contact me for questions or concerns.      Sincerely,        JANNETTE Vo CNP

## 2019-11-21 NOTE — PATIENT INSTRUCTIONS
Robitussin DM as needed    Start taking over the counter Claritin, Zyrtec or Allegra.     Return to work on Monday.     Notify if voice is not returned with the steroid is completed.

## 2019-11-22 LAB
BACTERIA SPEC CULT: NORMAL
SPECIMEN SOURCE: NORMAL

## 2019-11-22 ASSESSMENT — ENCOUNTER SYMPTOMS: EYE ITCHING: 1

## 2020-02-16 ENCOUNTER — HEALTH MAINTENANCE LETTER (OUTPATIENT)
Age: 40
End: 2020-02-16

## 2020-02-21 ENCOUNTER — OFFICE VISIT (OUTPATIENT)
Dept: PODIATRY | Facility: CLINIC | Age: 40
End: 2020-02-21
Payer: COMMERCIAL

## 2020-02-21 VITALS
SYSTOLIC BLOOD PRESSURE: 106 MMHG | DIASTOLIC BLOOD PRESSURE: 51 MMHG | HEIGHT: 62 IN | HEART RATE: 71 BPM | WEIGHT: 121.4 LBS | BODY MASS INDEX: 22.34 KG/M2

## 2020-02-21 DIAGNOSIS — L84 CALLUS OF FOOT: Primary | ICD-10-CM

## 2020-02-21 PROCEDURE — 11055 PARING/CUTG B9 HYPRKER LES 1: CPT | Performed by: PODIATRIST

## 2020-02-21 PROCEDURE — 99203 OFFICE O/P NEW LOW 30 MIN: CPT | Mod: 25 | Performed by: PODIATRIST

## 2020-02-21 ASSESSMENT — MIFFLIN-ST. JEOR: SCORE: 1170.98

## 2020-02-21 NOTE — LETTER
2020         RE: Lorraine Huston  [address]  [place] [address]        Dear Colleague,    Thank you for referring your patient, Lorraine Huston, to the [facility]. Please see a copy of my visit note below.    PATIENT HISTORY:  Lorraine Huston is a 39 year old female who presents to clinic with a painful callus on the left great toe.  The patient relates that the callus developed after starting a stairclimbing exercise routine over the past 2 weeks.  The patient relates severe pain over the outside of the left great toe.  The patient relates slight redness around the area.  She relates that it started out as a blister.    REVIEW OF SYSTEMS:  Constitutional, HEENT, cardiovascular, pulmonary, GI, , musculoskeletal, neuro, skin, endocrine and psych systems are negative, except as otherwise noted.     PAST MEDICAL HISTORY:   Past Medical History:   Diagnosis Date     Infertility     clomid pregnancy        PAST SURGICAL HISTORY:   Past Surgical History:   Procedure Laterality Date     C/SECTION, LOW TRANSVERSE  09    , Low Transverse        MEDICATIONS:   Current Outpatient Medications:      Multiple Vitamins-Minerals (AIRBORNE GUMMIES PO), , Disp: , Rfl:      Multiple Vitamins-Minerals (MULTIVITAMIN & MINERAL PO), , Disp: , Rfl:      albuterol (PROAIR HFA/PROVENTIL HFA/VENTOLIN HFA) 108 (90 Base) MCG/ACT inhaler, INHALE 2 PUFFS INTO THE LUNGS EVERY 6 HOURS (Patient not taking: Reported on 2020), Disp: 8.5 Inhaler, Rfl: 0     methylPREDNISolone (MEDROL DOSEPAK) 4 MG tablet therapy pack, Follow Package Directions (Patient not taking: Reported on 2020), Disp: 21 tablet, Rfl: 0     ALLERGIES:    Allergies   Allergen Reactions     Hydrocodone         SOCIAL HISTORY:   Social History     Socioeconomic History     Marital status:      Spouse name: Not on file     Number of children: Not on file     Years of education: Not on file     Highest education level:  Not on file   Occupational History     Occupation: Paraprofessional   Social Needs     Financial resource strain: Not on file     Food insecurity:     Worry: Not on file     Inability: Not on file     Transportation needs:     Medical: Not on file     Non-medical: Not on file   Tobacco Use     Smoking status: Never Smoker     Smokeless tobacco: Never Used   Substance and Sexual Activity     Alcohol use: No     Drug use: No     Sexual activity: Yes     Partners: Male     Birth control/protection: Condom, Male Surgical     Comment: vasectomy   Lifestyle     Physical activity:     Days per week: Not on file     Minutes per session: Not on file     Stress: Not on file   Relationships     Social connections:     Talks on phone: Not on file     Gets together: Not on file     Attends Religion service: Not on file     Active member of club or organization: Not on file     Attends meetings of clubs or organizations: Not on file     Relationship status: Not on file     Intimate partner violence:     Fear of current or ex partner: Not on file     Emotionally abused: Not on file     Physically abused: Not on file     Forced sexual activity: Not on file   Other Topics Concern      Service No     Blood Transfusions No     Caffeine Concern No     Occupational Exposure No     Hobby Hazards Yes     Comment: down hill ski     Sleep Concern No     Stress Concern No     Weight Concern No     Special Diet Yes     Comment: eat healthy (not a milk drinker drink calcium OJ     Back Care Yes     Comment: Left upper and Left shoulder     Exercise Yes     Bike Helmet Not Asked     Comment: N/A     Seat Belt Yes     Self-Exams Yes     Parent/sibling w/ CABG, MI or angioplasty before 65F 55M? No   Social History Narrative    November 8, 2018    ENVIRONMENTAL HISTORY: The [place] The home is heated with a forced air. They do have central air conditioning. The patient's bedroom is  "furnished with carpeting in bedroom. No pets inside the house. There is no history of cockroach or mice infestation. There are no smokers in the house.  The house does not have a damp basement.         FAMILY HISTORY:   Family History   Problem Relation Age of Onset     Diabetes Maternal Grandmother      Heart Disease Father      Hypertension Father      Cancer Maternal Grandfather         lung     Deep Vein Thrombosis Paternal Grandmother      Hypertension Paternal Grandmother         EXAM:Vitals: /51   Pulse 71   Ht 1.562 m (5' 1.5\")   Wt 55.1 kg (121 lb 6.4 oz)   BMI 22.57 kg/m     BMI= Body mass index is 22.57 kg/m .        General appearance: Patient is alert and fully cooperative with history & exam.  No sign of distress is noted during the visit.     Psychiatric: Affect is pleasant & appropriate.  Patient appears motivated to improve health.     Respiratory: Breathing is regular & unlabored while sitting.     HEENT: Hearing is intact to spoken word.  Speech is clear.  No gross evidence of visual impairment that would impact ambulation.     Dermatologic: Skin is intact to left lower extremities without significant lesions, rash or abrasion.  No paronychia or evidence of soft tissue infection is noted.  One notes a hyperkeratotic skin lesion over the medial aspect of the left great toe.  No significant erythema or noted.  No drainage noted.  No subdermal hemorrhage noted.     Vascular: DP & PT pulses are intact & regular on the left.  No significant edema or varicosities noted.  CFT and skin temperature is normal to the left lower extremities.     Neurologic: Lower extremity sensation is intact to light touch.  No evidence of weakness or contracture in the left lower extremities.  No evidence of neuropathy.     Musculoskeletal: Patient is ambulatory without assistive device or brace.  No gross ankle deformity noted.  No foot or ankle joint effusion is noted.          ASSESSMENT / PLAN:     ICD-10-CM  "   1. Callus of foot L84 TRIM HYPERKERATOTIC SKIN LESION, ONE       I have explained to Lorraine  about the conditions.  We discussed the nature of the condition as well as the treatment plan and expected length of recovery.  At this time, the hyperkeratotic skin lesion was sharply debrided with a #10 blade down to healthy epidermis.  The patient was advised to wear a silicone toe sleeve over the left great toe to better offload the pressure forces causing the callus formation.    Lorraine verbalized agreement with and understanding of the rational for the diagnosis and treatment plan.  All questions were answered to best of my ability and the patient's satisfaction. The patient was advised to contact the clinic with any questions that may arise after the clinic visit.      Disclaimer: This note consists of symbols derived from keyboarding, dictation and/or voice recognition software. As a result, there may be errors in the script that have gone undetected. Please consider this when interpreting information found in this chart.       FRANNY Bowman.P.M., F.A.C.F.A.S.        Again, thank you for allowing me to participate in the care of your patient.        Sincerely,        Ha Aguayo DPM

## 2020-02-21 NOTE — PROGRESS NOTES
PATIENT HISTORY:  Lorraine Huston is a 39 year old female who presents to clinic with a painful callus on the left great toe.  The patient relates that the callus developed after starting a stairclimbing exercise routine over the past 2 weeks.  The patient relates severe pain over the outside of the left great toe.  The patient relates slight redness around the area.  She relates that it started out as a blister.    REVIEW OF SYSTEMS:  Constitutional, HEENT, cardiovascular, pulmonary, GI, , musculoskeletal, neuro, skin, endocrine and psych systems are negative, except as otherwise noted.     PAST MEDICAL HISTORY:   Past Medical History:   Diagnosis Date     Infertility     clomid pregnancy        PAST SURGICAL HISTORY:   Past Surgical History:   Procedure Laterality Date     C/SECTION, LOW TRANSVERSE  09    , Low Transverse        MEDICATIONS:   Current Outpatient Medications:      Multiple Vitamins-Minerals (AIRBORNE GUMMIES PO), , Disp: , Rfl:      Multiple Vitamins-Minerals (MULTIVITAMIN & MINERAL PO), , Disp: , Rfl:      albuterol (PROAIR HFA/PROVENTIL HFA/VENTOLIN HFA) 108 (90 Base) MCG/ACT inhaler, INHALE 2 PUFFS INTO THE LUNGS EVERY 6 HOURS (Patient not taking: Reported on 2020), Disp: 8.5 Inhaler, Rfl: 0     methylPREDNISolone (MEDROL DOSEPAK) 4 MG tablet therapy pack, Follow Package Directions (Patient not taking: Reported on 2020), Disp: 21 tablet, Rfl: 0     ALLERGIES:    Allergies   Allergen Reactions     Hydrocodone         SOCIAL HISTORY:   Social History     Socioeconomic History     Marital status:      Spouse name: Not on file     Number of children: Not on file     Years of education: Not on file     Highest education level: Not on file   Occupational History     Occupation: Paraprofessional   Social Needs     Financial resource strain: Not on file     Food insecurity:     Worry: Not on file     Inability: Not on file     Transportation needs:     Medical: Not on  file     Non-medical: Not on file   Tobacco Use     Smoking status: Never Smoker     Smokeless tobacco: Never Used   Substance and Sexual Activity     Alcohol use: No     Drug use: No     Sexual activity: Yes     Partners: Male     Birth control/protection: Condom, Male Surgical     Comment: vasectomy   Lifestyle     Physical activity:     Days per week: Not on file     Minutes per session: Not on file     Stress: Not on file   Relationships     Social connections:     Talks on phone: Not on file     Gets together: Not on file     Attends Amish service: Not on file     Active member of club or organization: Not on file     Attends meetings of clubs or organizations: Not on file     Relationship status: Not on file     Intimate partner violence:     Fear of current or ex partner: Not on file     Emotionally abused: Not on file     Physically abused: Not on file     Forced sexual activity: Not on file   Other Topics Concern      Service No     Blood Transfusions No     Caffeine Concern No     Occupational Exposure No     Hobby Hazards Yes     Comment: down hill ski     Sleep Concern No     Stress Concern No     Weight Concern No     Special Diet Yes     Comment: eat healthy (not a milk drinker drink calcium OJ     Back Care Yes     Comment: Left upper and Left shoulder     Exercise Yes     Bike Helmet Not Asked     Comment: N/A     Seat Belt Yes     Self-Exams Yes     Parent/sibling w/ CABG, MI or angioplasty before 65F 55M? No   Social History Narrative    November 8, 2018    ENVIRONMENTAL HISTORY: The family [place] an [place]. The home is heated with a forced air. They do have central air conditioning. The patient's bedroom is furnished with carpeting in bedroom. No pets inside the house. There is no history of cockroach or mice infestation. There are no smokers in the house.  The house does not have a damp basement.         FAMILY HISTORY:   Family History   Problem  "Relation Age of Onset     Diabetes Maternal Grandmother      Heart Disease Father      Hypertension Father      Cancer Maternal Grandfather         lung     Deep Vein Thrombosis Paternal Grandmother      Hypertension Paternal Grandmother         EXAM:Vitals: /51   Pulse 71   Ht 1.562 m (5' 1.5\")   Wt 55.1 kg (121 lb 6.4 oz)   BMI 22.57 kg/m    BMI= Body mass index is 22.57 kg/m .        General appearance: Patient is alert and fully cooperative with history & exam.  No sign of distress is noted during the visit.     Psychiatric: Affect is pleasant & appropriate.  Patient appears motivated to improve health.     Respiratory: Breathing is regular & unlabored while sitting.     HEENT: Hearing is intact to spoken word.  Speech is clear.  No gross evidence of visual impairment that would impact ambulation.     Dermatologic: Skin is intact to left lower extremities without significant lesions, rash or abrasion.  No paronychia or evidence of soft tissue infection is noted.  One notes a hyperkeratotic skin lesion over the medial aspect of the left great toe.  No significant erythema or noted.  No drainage noted.  No subdermal hemorrhage noted.     Vascular: DP & PT pulses are intact & regular on the left.  No significant edema or varicosities noted.  CFT and skin temperature is normal to the left lower extremities.     Neurologic: Lower extremity sensation is intact to light touch.  No evidence of weakness or contracture in the left lower extremities.  No evidence of neuropathy.     Musculoskeletal: Patient is ambulatory without assistive device or brace.  No gross ankle deformity noted.  No foot or ankle joint effusion is noted.          ASSESSMENT / PLAN:     ICD-10-CM    1. Callus of foot L84 TRIM HYPERKERATOTIC SKIN LESION, ONE       I have explained to Lorraine  about the conditions.  We discussed the nature of the condition as well as the treatment plan and expected length of recovery.  At this time, the " hyperkeratotic skin lesion was sharply debrided with a #10 blade down to healthy epidermis.  The patient was advised to wear a silicone toe sleeve over the left great toe to better offload the pressure forces causing the callus formation.    Vera verbalized agreement with and understanding of the rational for the diagnosis and treatment plan.  All questions were answered to best of my ability and the patient's satisfaction. The patient was advised to contact the clinic with any questions that may arise after the clinic visit.      Disclaimer: This note consists of symbols derived from keyboarding, dictation and/or voice recognition software. As a result, there may be errors in the script that have gone undetected. Please consider this when interpreting information found in this chart.       ALISON Aguayo D.P.M., F.TUNDE.C.F.A.S.

## 2020-02-21 NOTE — NURSING NOTE
"Chief Complaint   Patient presents with     Consult     Callus on left foot       Initial /51   Pulse 71   Ht 1.562 m (5' 1.5\")   Wt 55.1 kg (121 lb 6.4 oz)   BMI 22.57 kg/m   Estimated body mass index is 22.57 kg/m  as calculated from the following:    Height as of this encounter: 1.562 m (5' 1.5\").    Weight as of this encounter: 55.1 kg (121 lb 6.4 oz).  Medications and allergies reviewed.      Whitney BLANTON MA    "
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
## 2020-02-24 ENCOUNTER — OFFICE VISIT (OUTPATIENT)
Dept: FAMILY MEDICINE | Facility: CLINIC | Age: 40
End: 2020-02-24
Payer: COMMERCIAL

## 2020-02-24 VITALS
TEMPERATURE: 98.7 F | DIASTOLIC BLOOD PRESSURE: 50 MMHG | WEIGHT: 121 LBS | BODY MASS INDEX: 22.84 KG/M2 | HEIGHT: 61 IN | SYSTOLIC BLOOD PRESSURE: 98 MMHG | HEART RATE: 72 BPM | RESPIRATION RATE: 16 BRPM

## 2020-02-24 DIAGNOSIS — R22.9 LUMP OF SKIN: ICD-10-CM

## 2020-02-24 DIAGNOSIS — Z13.220 SCREENING FOR LIPOID DISORDERS: ICD-10-CM

## 2020-02-24 DIAGNOSIS — L60.8 CHANGE IN NAIL APPEARANCE: ICD-10-CM

## 2020-02-24 DIAGNOSIS — Z12.31 ENCOUNTER FOR SCREENING MAMMOGRAM FOR BREAST CANCER: ICD-10-CM

## 2020-02-24 DIAGNOSIS — Z00.00 ROUTINE GENERAL MEDICAL EXAMINATION AT A HEALTH CARE FACILITY: Primary | ICD-10-CM

## 2020-02-24 DIAGNOSIS — Z13.1 SCREENING FOR DIABETES MELLITUS: ICD-10-CM

## 2020-02-24 PROCEDURE — 99395 PREV VISIT EST AGE 18-39: CPT | Performed by: NURSE PRACTITIONER

## 2020-02-24 ASSESSMENT — ENCOUNTER SYMPTOMS
NUMBNESS: 0
FREQUENCY: 0
ABDOMINAL PAIN: 0
SLEEP DISTURBANCE: 0
WHEEZING: 0
DIFFICULTY URINATING: 0
ARTHRALGIAS: 0
SORE THROAT: 0
POLYDIPSIA: 0
NERVOUS/ANXIOUS: 0
POLYPHAGIA: 0
NAUSEA: 0
DIZZINESS: 0
LIGHT-HEADEDNESS: 0
CHEST TIGHTNESS: 0
SHORTNESS OF BREATH: 0
ACTIVITY CHANGE: 0
HEADACHES: 0
ABDOMINAL DISTENTION: 0
CONSTIPATION: 0
RHINORRHEA: 0
DIARRHEA: 0
PALPITATIONS: 0
DYSPHORIC MOOD: 0
VOMITING: 0
FATIGUE: 0
COUGH: 0

## 2020-02-24 ASSESSMENT — MIFFLIN-ST. JEOR: SCORE: 1161.23

## 2020-02-24 ASSESSMENT — PAIN SCALES - GENERAL: PAINLEVEL: NO PAIN (0)

## 2020-02-24 NOTE — PROGRESS NOTES
SUBJECTIVE:   CC: Lorraine Huston is an 39 year old woman who presents for preventive health visit.     Chief Complaint   Patient presents with     Physical     pt is not fasting       Healthy Habits:    Do you get at least three servings of calcium containing foods daily (dairy, green leafy vegetables, etc.)? yes    Amount of exercise or daily activities, outside of work: 5 day(s) per week    Problems taking medications regularly No    Medication side effects: No    Have you had an eye exam in the past two years? yes    Do you see a dentist twice per year? yes    Do you have sleep apnea, excessive snoring or daytime drowsiness?no      Today's PHQ-2 Score:   PHQ-2 ( 1999 [employer]) 2/24/2020 1/10/2019   Q1: Little interest or pleasure in doing things 0 0   Q2: Feeling down, depressed or hopeless 0 0   PHQ-2 Score 0 0       Abuse: Current or Past(Physical, Sexual or Emotional)- No  Do you feel safe in your environment? Yes        Social History     Tobacco Use     Smoking status: Never Smoker     Smokeless tobacco: Never Used   Substance Use Topics     Alcohol use: No     If you drink alcohol do you typically have >3 drinks per day or >7 drinks per week? No                     Reviewed orders with patient.  Reviewed health maintenance and updated orders accordingly - Yes  Current Outpatient Medications   Medication Sig Dispense Refill     Multiple Vitamins-Minerals (MULTIVITAMIN & MINERAL PO)        Allergies   Allergen Reactions     Hydrocodone        Mammogram not appropriate for this patient based on age.    Pertinent mammograms are reviewed under the imaging tab.  History of abnormal Pap smear: NO - age 30-65 PAP every 5 years with negative HPV co-testing recommended  PAP / HPV Latest Ref Rng & Units 1/10/2019 8/20/2013 8/2/2011   PAP - NIL NIL NIL   HPV 16 DNA NEG:Negative Negative - -   HPV 18 DNA NEG:Negative Negative - -   OTHER HR HPV NEG:Negative Negative - -     Reviewed and updated as needed this visit by  clinical staff  Tobacco  Allergies  Meds  Med Hx  Surg Hx  Fam Hx  Soc Hx        Reviewed and updated as needed this visit by Provider        Here today for physical.  Is not fasting for labs.  Had mammogram in 2011 due to lump    Has noticed a lump to her left side.  Is not getting bigger.  Is not tender.  Seems to move around.  Has been there for some time.    Has a line to left nail.  Is concerned that this could be cancerous.    Periods are regular. Will get some spotting and then will get period will come a couple days later. Will last 2 days or so. Flow is light.     Last Pap: 1/19 normal, neg HPV  Last mammogram: 2011 due lump. Mother with a lot of cysts.   Last BMD: N/A  Last Colonoscopy: Never, no family history of colon cancer.   Last eye exam: Yearly   Last dental exam: Every 6 mo  Last tetanus vaccine: 2012  Last influenza vaccine: 10/19  Last shingles vaccine: N/A  Last pneumonia vaccine: N/A  Hep C screen (born [birth year]):  N/A  HIV screen: 2009-negative   AAA screen (age 65-78 with smoking hx): N/A  IVD (HTN, Hyperlipid, Smoking): N/A  Lung CA screening (55-80, 30 pk smoking hx): N/A    ROS:  Review of Systems   Constitutional: Negative for activity change and fatigue.   HENT: Negative for ear pain, rhinorrhea and sore throat.    Respiratory: Negative for cough, chest tightness, shortness of breath and wheezing.    Cardiovascular: Negative for chest pain, palpitations and leg swelling.   Gastrointestinal: Negative for abdominal distention, abdominal pain, constipation, diarrhea, nausea and vomiting.   Endocrine: Negative for cold intolerance, heat intolerance, polydipsia, polyphagia and polyuria.   Genitourinary: Negative for difficulty urinating, enuresis, frequency and urgency.   Musculoskeletal: Negative for arthralgias.   Skin: Negative for rash.        Dark line to left thumb nail  Bump to left side    Neurological: Negative for dizziness, light-headedness, numbness and headaches.  "  Psychiatric/Behavioral: Negative for dysphoric mood and sleep disturbance. The patient is not nervous/anxious.          OBJECTIVE:   BP 98/50 (BP Location: Left arm, Patient Position: Sitting, Cuff Size: Adult Regular)   Pulse 72   Temp 98.7  F (37.1  C) (Tympanic)   Resp 16   Ht 1.549 m (5' 1\")   Wt 54.9 kg (121 lb)   LMP 02/13/2020   BMI 22.86 kg/m    EXAM:  Physical Exam  Constitutional:       Appearance: Normal appearance. She is well-developed.   HENT:      Head: Normocephalic and atraumatic.      Right Ear: Tympanic membrane and external ear normal. No middle ear effusion.      Left Ear: Tympanic membrane and external ear normal.  No middle ear effusion.      Nose: No mucosal edema.      Mouth/Throat:      Pharynx: Uvula midline.   Eyes:      Pupils: Pupils are equal, round, and reactive to light.   Neck:      Musculoskeletal: Normal range of motion.      Thyroid: No thyromegaly.      Vascular: No carotid bruit.   Cardiovascular:      Rate and Rhythm: Normal rate and regular rhythm.      Pulses:           Femoral pulses are 2+ on the right side and 2+ on the left side.     Heart sounds: Normal heart sounds.   Pulmonary:      Effort: Pulmonary effort is normal.      Breath sounds: Normal breath sounds.   Abdominal:      General: Bowel sounds are normal.      Palpations: Abdomen is soft.      Tenderness: There is no abdominal tenderness.       Musculoskeletal: Normal range of motion.   Skin:     General: Skin is warm and dry.      Findings: No rash.          Neurological:      Mental Status: She is alert.   Psychiatric:         Behavior: Behavior normal.           ASSESSMENT/PLAN:   1. Routine general medical examination at a health care facility  Screening guidelines reviewed.     2. Encounter for screening mammogram for breast cancer  Order placed, plans to call per self and set up  - MA Screening Digital Bilateral; Future    3. Screening for diabetes mellitus  Return for fasting labs  - Glucose; " "Future    4. Screening for lipoid disorders  Return for fasting labs  - Lipid panel reflex to direct LDL Fasting; Future    5. Change in nail appearance  Order placed, plans to call per self and set up  - DERMATOLOGY REFERRAL    6. Lump of skin  Order placed, plans to call per self and set up  - US Extremity Non Vascular Bilateral; Future    COUNSELING:   Reviewed preventive health counseling, as reflected in patient instructions       Regular exercise       Healthy diet/nutrition       Colon cancer screening    Estimated body mass index is 22.86 kg/m  as calculated from the following:    Height as of this encounter: 1.549 m (5' 1\").    Weight as of this encounter: 54.9 kg (121 lb).         reports that she has never smoked. She has never used smokeless tobacco.      Counseling Resources:  ATP IV Guidelines  Pooled Cohorts Equation Calculator  Breast Cancer Risk Calculator  FRAX Risk Assessment  ICSI Preventive Guidelines  Dietary Guidelines for Americans, 2010  USDA's MyPlate  ASA Prophylaxis  Lung CA Screening    [facility]  "

## 2020-02-26 ENCOUNTER — ANCILLARY PROCEDURE (OUTPATIENT)
Dept: ULTRASOUND IMAGING | Facility: CLINIC | Age: 40
End: 2020-02-26
Attending: NURSE PRACTITIONER
Payer: COMMERCIAL

## 2020-02-26 DIAGNOSIS — R22.9 LUMP OF SKIN: ICD-10-CM

## 2020-02-26 PROCEDURE — 76882 US LMTD JT/FCL EVL NVASC XTR: CPT | Mod: LT

## 2020-02-27 DIAGNOSIS — Z13.220 SCREENING FOR LIPOID DISORDERS: ICD-10-CM

## 2020-02-27 DIAGNOSIS — Z13.1 SCREENING FOR DIABETES MELLITUS: ICD-10-CM

## 2020-02-27 LAB
CHOLEST SERPL-MCNC: 198 MG/DL
GLUCOSE SERPL-MCNC: 91 MG/DL (ref 70–99)
HDLC SERPL-MCNC: 84 MG/DL
LDLC SERPL CALC-MCNC: 100 MG/DL
NONHDLC SERPL-MCNC: 114 MG/DL
TRIGL SERPL-MCNC: 70 MG/DL

## 2020-02-27 PROCEDURE — 36415 COLL VENOUS BLD VENIPUNCTURE: CPT | Performed by: NURSE PRACTITIONER

## 2020-02-27 PROCEDURE — 82947 ASSAY GLUCOSE BLOOD QUANT: CPT | Performed by: NURSE PRACTITIONER

## 2020-02-27 PROCEDURE — 80061 LIPID PANEL: CPT | Performed by: NURSE PRACTITIONER

## 2020-03-17 ENCOUNTER — OFFICE VISIT (OUTPATIENT)
Dept: DERMATOLOGY | Facility: CLINIC | Age: 40
End: 2020-03-17
Payer: COMMERCIAL

## 2020-03-17 VITALS
HEIGHT: 62 IN | SYSTOLIC BLOOD PRESSURE: 110 MMHG | DIASTOLIC BLOOD PRESSURE: 66 MMHG | HEART RATE: 80 BPM | BODY MASS INDEX: 22.13 KG/M2

## 2020-03-17 DIAGNOSIS — L81.4 LENTIGO: Primary | ICD-10-CM

## 2020-03-17 DIAGNOSIS — L60.8 MELANONYCHIA: ICD-10-CM

## 2020-03-17 PROCEDURE — 99203 OFFICE O/P NEW LOW 30 MIN: CPT | Performed by: DERMATOLOGY

## 2020-03-17 NOTE — LETTER
3/17/2020         RE: Lorraine Huston  [id number] [place] [address]        Dear Colleague,    Thank you for referring your patient, Lorraine Huston, to the [facility]. Please see a copy of my visit note below.    Lorraine Huston , a 40 year old year old female patient, I was asked to see by A Thee for spot on nail.  Patient states this has been present for a while.  Patient reports the following symptoms:  none .  Patient reports the following previous treatments none.  Patient reports the following modifying factors none.  Associated symptoms: none.  Patient has no other skin complaints today.  Remainder of the HPI, Meds, PMH, Allergies, FH, and SH was reviewed in chart.      Past Medical History:   Diagnosis Date     Infertility     clomid pregnancy       Past Surgical History:   Procedure Laterality Date     C/SECTION, LOW TRANSVERSE  09    , Low Transverse        Family History   Problem Relation Age of Onset     Diabetes Maternal Grandmother      Heart Disease Father      Hypertension Father      Cancer Maternal Grandfather         lung     Deep Vein Thrombosis Paternal Grandmother      Hypertension Paternal Grandmother        Social History     Socioeconomic History     Marital status:      Spouse name: Not on file     Number of children: Not on file     Years of education: Not on file     Highest education level: Not on file   Occupational History     Occupation: Paraprofessional   Social Needs     Financial resource strain: Not on file     Food insecurity     Worry: Not on file     Inability: Not on file     Transportation needs     Medical: Not on file     Non-medical: Not on file   Tobacco Use     Smoking status: Never Smoker     Smokeless tobacco: Never Used   Substance and Sexual Activity     Alcohol use: No     Drug use: No     Sexual activity: Yes     Partners: Male     Birth control/protection: Condom, Male Surgical     Comment: vasectomy    Lifestyle     Physical activity     Days per week: Not on file     Minutes per session: Not on file     Stress: Not on file   Relationships     Social connections     Talks on phone: Not on file     Gets together: Not on file     Attends [employer] service: Not on file     Active member of club or organization: Not on file     Attends meetings of clubs or organizations: Not on file     Relationship status: Not on file     Intimate partner violence     Fear of current or ex partner: Not on file     Emotionally abused: Not on file     Physically abused: Not on file     Forced sexual activity: Not on file   Other Topics Concern      Service No     Blood Transfusions No     Caffeine Concern No     Occupational Exposure No     Hobby Hazards Yes     Comment: down hill ski     Sleep Concern No     Stress Concern No     Weight Concern No     Special Diet Yes     Comment: eat healthy (not a milk drinker drink calcium OJ     Back Care Yes     Comment: Left upper and Left shoulder     Exercise Yes     Bike Helmet Not Asked     Comment: N/A     Seat Belt Yes     Self-Exams Yes     Parent/sibling w/ CABG, MI or angioplasty before 65F 55M? No   Social History Narrative    November 8, 2018    ENVIRONMENTAL HISTORY: [place] The home is heated with a forced air. They do have central air conditioning. The patient's bedroom is furnished with carpeting in bedroom. No pets inside the house. There is no history of cockroach or mice infestation. There are no smokers in the house.  The house does not have a damp basement.        Outpatient Encounter Medications as of 3/17/2020   Medication Sig Dispense Refill     Multiple Vitamins-Minerals (MULTIVITAMIN & MINERAL PO)        No facility-administered encounter medications on file as of 3/17/2020.              Review Of Systems  Skin: As above  Eyes: negative  Ears/Nose/Throat: negative  Respiratory: No shortness of breath,  dyspnea on exertion, cough, or hemoptysis  Cardiovascular: negative  Gastrointestinal: negative  Genitourinary: negative  Musculoskeletal: negative  Neurologic: negative  Psychiatric: negative  Hematologic/Lymphatic/Immunologic: negative  Endocrine: negative      O:   NAD, WDWN, Alert & Oriented, Mood & Affect wnl, Vitals stable   Here today alone    General appearance Jean IV   Vitals stable   Alert, oriented and in no acute distress      R middle finger and l index finger linear melanonychia <5mm uniform symmetric no pigment on cuticle  Brown nmaculeson face    Eyes: Conjunctivae/lids:Normal     ENT: Lips, buccal mucosa, tongue: normal    MSK:Normal    Pulm: Breathing Normal    Lymph Nodes: No Head and Neck Lymphadenopathy     Neuro/Psych: Orientation:Normal; Mood/Affect:Normal      A/P:  1. Linear melanonychia , lentigo   Pathophysiology discussed with pateint and information provided   BENIGN LESIONS DISCUSSED WITH PATIENT:  I discussed the specifics of tumor, prognosis, and genetics of benign lesions.  I explained that treatment of these lesions would be purely cosmetic and not medically neccessary.  I discussed with patient different removal options including excision, cautery and /or laser.      Nature and genetics of benign skin lesions dicussed with patient.  Signs and Symptoms of skin cancer discussed with patient.  ABCDEs of melanoma reviewed with patient.  Patient encouraged to perform monthly skin exams.  UV precautions reviewed with patient.  Return to clinic 6 months      Again, thank you for allowing me to participate in the care of your patient.        Sincerely,        Marvel Davila MD

## 2020-03-17 NOTE — PROGRESS NOTES
Lorraine Huston , a 40 year old year old female patient, I was asked to see by A Thee for spot on nail.  Patient states this has been present for a while.  Patient reports the following symptoms:  none .  Patient reports the following previous treatments none.  Patient reports the following modifying factors none.  Associated symptoms: none.  Patient has no other skin complaints today.  Remainder of the HPI, Meds, PMH, Allergies, FH, and SH was reviewed in chart.      Past Medical History:   Diagnosis Date     Infertility     clomid pregnancy       Past Surgical History:   Procedure Laterality Date     C/SECTION, LOW TRANSVERSE  09    , Low Transverse        Family History   Problem Relation Age of Onset     Diabetes Maternal Grandmother      Heart Disease Father      Hypertension Father      Cancer Maternal Grandfather         lung     Deep Vein Thrombosis Paternal Grandmother      Hypertension Paternal Grandmother        Social History     Socioeconomic History     Marital status:      Spouse name: Not on file     Number of children: Not on file     Years of education: Not on file     Highest education level: Not on file   Occupational History     Occupation: Paraprofessional   Social Needs     Financial resource strain: Not on file     Food insecurity     Worry: Not on file     Inability: Not on file     Transportation needs     Medical: Not on file     Non-medical: Not on file   Tobacco Use     Smoking status: Never Smoker     Smokeless tobacco: Never Used   Substance and Sexual Activity     Alcohol use: No     Drug use: No     Sexual activity: Yes     Partners: Male     Birth control/protection: Condom, Male Surgical     Comment: vasectomy   Lifestyle     Physical activity     Days per week: Not on file     Minutes per session: Not on file     Stress: Not on file   Relationships     Social connections     Talks on phone: Not on file     Gets together: Not on file     Attends Worship  service: Not on file     Active member of club or organization: Not on file     Attends meetings of clubs or organizations: Not on file     Relationship status: Not on file     Intimate partner violence     Fear of current or ex partner: Not on file     Emotionally abused: Not on file     Physically abused: Not on file     Forced sexual activity: Not on file   Other Topics Concern      Service No     Blood Transfusions No     Caffeine Concern No     Occupational Exposure No     Hobby Hazards Yes     Comment: down hill ski     Sleep Concern No     Stress Concern No     Weight Concern No     Special Diet Yes     Comment: eat healthy (not a milk drinker drink calcium OJ     Back Care Yes     Comment: Left upper and Left shoulder     Exercise Yes     Bike Helmet Not Asked     Comment: N/A     Seat Belt Yes     Self-Exams Yes     Parent/sibling w/ CABG, MI or angioplasty before 65F 55M? No   Social History Narrative    November 8, 2018    ENVIRONMENTAL HISTORY: The family [place] The home is heated with a forced air. They do have central air conditioning. The patient's bedroom is furnished with carpeting in bedroom. No pets inside the house. There is no history of cockroach or mice infestation. There are no smokers in the house.  The house does not have a damp basement.        Outpatient Encounter Medications as of 3/17/2020   Medication Sig Dispense Refill     Multiple Vitamins-Minerals (MULTIVITAMIN & MINERAL PO)        No facility-administered encounter medications on file as of 3/17/2020.              Review Of Systems  Skin: As above  Eyes: negative  Ears/Nose/Throat: negative  Respiratory: No shortness of breath, dyspnea on exertion, cough, or hemoptysis  Cardiovascular: negative  Gastrointestinal: negative  Genitourinary: negative  Musculoskeletal: negative  Neurologic: negative  Psychiatric: negative  Hematologic/Lymphatic/Immunologic: negative  Endocrine:  negative      O:   NAD, WDWN, Alert & Oriented, Mood & Affect wnl, Vitals stable   Here today alone    General appearance Jean IV   Vitals stable   Alert, oriented and in no acute distress      R middle finger and l index finger linear melanonychia <5mm uniform symmetric no pigment on cuticle  Brown nmaculeson face    Eyes: Conjunctivae/lids:Normal     ENT: Lips, buccal mucosa, tongue: normal    MSK:Normal    Pulm: Breathing Normal    Lymph Nodes: No Head and Neck Lymphadenopathy     Neuro/Psych: Orientation:Normal; Mood/Affect:Normal      A/P:  1. Linear melanonychia , lentigo   Pathophysiology discussed with pateint and information provided   BENIGN LESIONS DISCUSSED WITH PATIENT:  I discussed the specifics of tumor, prognosis, and genetics of benign lesions.  I explained that treatment of these lesions would be purely cosmetic and not medically neccessary.  I discussed with patient different removal options including excision, cautery and /or laser.      Nature and genetics of benign skin lesions dicussed with patient.  Signs and Symptoms of skin cancer discussed with patient.  ABCDEs of melanoma reviewed with patient.  Patient encouraged to perform monthly skin exams.  UV precautions reviewed with patient.  Return to clinic 6 months

## 2020-05-28 ENCOUNTER — ANCILLARY PROCEDURE (OUTPATIENT)
Dept: MAMMOGRAPHY | Facility: CLINIC | Age: 40
End: 2020-05-28
Payer: COMMERCIAL

## 2020-05-28 DIAGNOSIS — Z12.31 ENCOUNTER FOR SCREENING MAMMOGRAM FOR BREAST CANCER: ICD-10-CM

## 2020-05-28 PROCEDURE — 77067 SCR MAMMO BI INCL CAD: CPT | Mod: TC

## 2020-05-28 PROCEDURE — 77063 BREAST TOMOSYNTHESIS BI: CPT | Mod: TC

## 2021-04-10 ENCOUNTER — HEALTH MAINTENANCE LETTER (OUTPATIENT)
Age: 41
End: 2021-04-10

## 2021-07-23 ENCOUNTER — OFFICE VISIT (OUTPATIENT)
Dept: OBGYN | Facility: CLINIC | Age: 41
End: 2021-07-23
Payer: COMMERCIAL

## 2021-07-23 VITALS
DIASTOLIC BLOOD PRESSURE: 69 MMHG | HEART RATE: 77 BPM | RESPIRATION RATE: 16 BRPM | BODY MASS INDEX: 23.22 KG/M2 | SYSTOLIC BLOOD PRESSURE: 121 MMHG | WEIGHT: 123 LBS | HEIGHT: 61 IN

## 2021-07-23 DIAGNOSIS — Z00.00 ROUTINE GENERAL MEDICAL EXAMINATION AT A HEALTH CARE FACILITY: Primary | ICD-10-CM

## 2021-07-23 LAB
ALBUMIN SERPL-MCNC: 4.1 G/DL (ref 3.4–5)
ALP SERPL-CCNC: 73 U/L (ref 40–150)
ALT SERPL W P-5'-P-CCNC: 24 U/L (ref 0–50)
ANION GAP SERPL CALCULATED.3IONS-SCNC: 5 MMOL/L (ref 3–14)
AST SERPL W P-5'-P-CCNC: 20 U/L (ref 0–45)
BILIRUB SERPL-MCNC: 0.7 MG/DL (ref 0.2–1.3)
BUN SERPL-MCNC: 14 MG/DL (ref 7–30)
CALCIUM SERPL-MCNC: 8.8 MG/DL (ref 8.5–10.1)
CHLORIDE BLD-SCNC: 106 MMOL/L (ref 94–109)
CHOLEST SERPL-MCNC: 231 MG/DL
CO2 SERPL-SCNC: 28 MMOL/L (ref 20–32)
CREAT SERPL-MCNC: 0.65 MG/DL (ref 0.52–1.04)
ERYTHROCYTE [DISTWIDTH] IN BLOOD BY AUTOMATED COUNT: 11.7 % (ref 10–15)
FASTING STATUS PATIENT QL REPORTED: YES
GFR SERPL CREATININE-BSD FRML MDRD: >90 ML/MIN/1.73M2
GLUCOSE BLD-MCNC: 88 MG/DL (ref 70–99)
HCT VFR BLD AUTO: 41.9 % (ref 35–47)
HDLC SERPL-MCNC: 78 MG/DL
HGB BLD-MCNC: 14.2 G/DL (ref 11.7–15.7)
LDLC SERPL CALC-MCNC: 129 MG/DL
MCH RBC QN AUTO: 30.6 PG (ref 26.5–33)
MCHC RBC AUTO-ENTMCNC: 33.9 G/DL (ref 31.5–36.5)
MCV RBC AUTO: 90 FL (ref 78–100)
NONHDLC SERPL-MCNC: 153 MG/DL
PLATELET # BLD AUTO: 233 10E3/UL (ref 150–450)
POTASSIUM BLD-SCNC: 3.7 MMOL/L (ref 3.4–5.3)
PROT SERPL-MCNC: 7.4 G/DL (ref 6.8–8.8)
RBC # BLD AUTO: 4.64 10E6/UL (ref 3.8–5.2)
SODIUM SERPL-SCNC: 139 MMOL/L (ref 133–144)
TRIGL SERPL-MCNC: 120 MG/DL
WBC # BLD AUTO: 5.7 10E3/UL (ref 4–11)

## 2021-07-23 PROCEDURE — 99396 PREV VISIT EST AGE 40-64: CPT | Performed by: OBSTETRICS & GYNECOLOGY

## 2021-07-23 PROCEDURE — 36415 COLL VENOUS BLD VENIPUNCTURE: CPT | Performed by: OBSTETRICS & GYNECOLOGY

## 2021-07-23 PROCEDURE — 80061 LIPID PANEL: CPT | Performed by: OBSTETRICS & GYNECOLOGY

## 2021-07-23 PROCEDURE — 80053 COMPREHEN METABOLIC PANEL: CPT | Performed by: OBSTETRICS & GYNECOLOGY

## 2021-07-23 PROCEDURE — 85027 COMPLETE CBC AUTOMATED: CPT | Performed by: OBSTETRICS & GYNECOLOGY

## 2021-07-23 ASSESSMENT — MIFFLIN-ST. JEOR: SCORE: 1164.26

## 2021-07-23 NOTE — NURSING NOTE
"Initial /69 (BP Location: Right arm, Patient Position: Chair, Cuff Size: Adult Small)   Pulse 77   Resp 16   Ht 1.556 m (5' 1.25\")   Wt 55.8 kg (123 lb)   LMP 07/14/2021   Breastfeeding No   BMI 23.05 kg/m   Estimated body mass index is 23.05 kg/m  as calculated from the following:    Height as of this encounter: 1.556 m (5' 1.25\").    Weight as of this encounter: 55.8 kg (123 lb). .    Emely Mckeon, [facility]    "

## 2021-07-23 NOTE — PROGRESS NOTES
SUBJECTIVE:   CC: Lorraine Huston is an 41 year old woman who presents for preventive health visit.     Menses generally tolerable.  Regular and monthly.  Occasional cramps.      Patient has been advised of split billing requirements and indicates understanding: Yes  Healthy Habits:    Do you get at least three servings of calcium containing foods daily (dairy, green leafy vegetables, etc.)? yes    Amount of exercise or daily activities, outside of work: 3-4 day(s) per week    Problems taking medications regularly No    Medication side effects: No    Have you had an eye exam in the past two years? yes    Do you see a dentist twice per year? yes    Do you have sleep apnea, excessive snoring or daytime drowsiness?no      -------------------------------------    Today's PHQ-2 Score:   PHQ-2 ( 1999 [employer]) 7/23/2021 2/24/2020   Q1: Little interest or pleasure in doing things 0 0   Q2: Feeling down, depressed or hopeless 0 0   PHQ-2 Score 0 0       Abuse: Current or Past(Physical, Sexual or Emotional)- No  Do you feel safe in your environment? Yes    Have you ever done Advance Care Planning? (For example, a Health Directive, POLST, or a discussion with a medical provider or your loved ones about your wishes): No, advance care planning information given to patient to review.  Patient plans to discuss their wishes with loved ones or provider.      Social History     Tobacco Use     Smoking status: Never Smoker     Smokeless tobacco: Never Used   Substance Use Topics     Alcohol use: No     If you drink alcohol do you typically have >3 drinks per day or >7 drinks per week? No                     Reviewed orders with patient.  Reviewed health maintenance and updated orders accordingly - Yes  BP Readings from Last 3 Encounters:   07/23/21 121/69   03/17/20 110/66   02/24/20 98/50    Wt Readings from Last 3 Encounters:   07/23/21 55.8 kg (123 lb)   02/24/20 54.9 kg (121 lb)   02/21/20 55.1 kg (121 lb 6.4 oz)                 "    FHS-7: No flowsheet data found.    Mammogram Screening - Offered annual screening and updated Health Maintenance for [place] plan based on risk factor consideration    Pertinent mammograms are reviewed under the imaging tab.    Pertinent mammograms are reviewed under the imaging tab.  History of abnormal Pap smear: NO - age 30-65 PAP every 5 years with negative HPV co-testing recommended  PAP / HPV Latest Ref Rng & Units 1/10/2019 8/20/2013 8/2/2011   PAP (Historical) - NIL NIL NIL   HPV16 NEG:Negative Negative - -   HPV18 NEG:Negative Negative - -   HRHPV NEG:Negative Negative - -     Reviewed and updated as needed this visit by clinical staff  Tobacco  Allergies  Meds   Med Hx  Surg Hx  Fam Hx  Soc Hx        Reviewed and updated as needed this visit by Provider                    ROS:  CONSTITUTIONAL: NEGATIVE for fever, chills, change in weight  INTEGUMENTARU/SKIN: NEGATIVE for worrisome rashes, moles or lesions  EYES: NEGATIVE for vision changes or irritation  ENT: NEGATIVE for ear, mouth and throat problems  RESP: NEGATIVE for significant cough or SOB  BREAST: NEGATIVE for masses, tenderness or discharge  CV: NEGATIVE for chest pain, palpitations or peripheral edema  GI: NEGATIVE for nausea, abdominal pain, heartburn, or change in bowel habits  : NEGATIVE for unusual urinary or vaginal symptoms. Periods are regular.  MUSCULOSKELETAL: NEGATIVE for significant arthralgias or myalgia  NEURO: NEGATIVE for weakness, dizziness or paresthesias  PSYCHIATRIC: NEGATIVE for changes in mood or affect    OBJECTIVE:   /69 (BP Location: Right arm, Patient Position: Chair, Cuff Size: Adult Small)   Pulse 77   Resp 16   Ht 1.556 m (5' 1.25\")   Wt 55.8 kg (123 lb)   LMP 07/14/2021   Breastfeeding No   BMI 23.05 kg/m    EXAM:  GENERAL: healthy, alert and no distress  EYES: Eyes grossly normal to inspection  NECK: no adenopathy, no asymmetry, masses, or scars and thyroid normal to palpation  RESP: lungs " "clear to auscultation - no rales, rhonchi or wheezes  BREAST: normal without masses, tenderness or nipple discharge and no palpable axillary masses or adenopathy  CV: regular rate and rhythm, normal S1 S2, no S3 or S4, no murmur, click or rub, no peripheral edema and peripheral pulses strong  ABDOMEN: soft, nontender, no hepatosplenomegaly, no masses and bowel sounds normal   (female): normal female external genitalia, normal urethral meatus, vaginal mucosa pink, moist, well rugated, and normal cervix/adnexa/uterus without masses or discharge  MS: no gross musculoskeletal defects noted, no edema  SKIN: no suspicious lesions or rashes  NEURO: Normal strength and tone, mentation intact and speech normal  PSYCH: mentation appears normal, affect normal/bright    Diagnostic Test Results:  Labs reviewed in Epic    ASSESSMENT/PLAN:   1. Routine general medical examination at a health care facility  Routine health maintenance reviewed.  Planning on mammogram every other year for now.  Pap up to date. Fasting labs ordered.     - Lipid panel reflex to direct LDL Fasting  - Comprehensive metabolic panel (BMP + Alb, Alk Phos, ALT, AST, Total. Bili, TP)  - CBC with platelets    Patient has been advised of split billing requirements and indicates understanding: Yes  COUNSELING:   Reviewed preventive health counseling, as reflected in patient instructions  Special attention given to:        Regular exercise       Healthy diet/nutrition       Vision screening       Alcohol Use       Folic Acid       Osteoporosis prevention/bone health       Safe sex practices/STD prevention       Colon cancer screening    Estimated body mass index is 23.05 kg/m  as calculated from the following:    Height as of this encounter: 1.556 m (5' 1.25\").    Weight as of this encounter: 55.8 kg (123 lb).        She reports that she has never smoked. She has never used smokeless tobacco.      Counseling Resources:  ATP IV Guidelines  Pooled Cohorts " Equation Calculator  Breast Cancer Risk Calculator  BRCA-Related Cancer Risk Assessment: FHS-7 Tool  FRAX Risk Assessment  ICSI Preventive Guidelines  Dietary Guidelines for Americans, 2010  USDA's MyPlate  ASA Prophylaxis  Lung CA Screening    Renee Singer MD  [facility]

## 2021-09-19 ENCOUNTER — HEALTH MAINTENANCE LETTER (OUTPATIENT)
Age: 41
End: 2021-09-19

## 2021-11-26 ENCOUNTER — IMMUNIZATION (OUTPATIENT)
Dept: NURSING | Facility: CLINIC | Age: 41
End: 2021-11-26
Payer: COMMERCIAL

## 2021-11-26 PROCEDURE — 91300 PR COVID VAC PFIZER DIL RECON 30 MCG/0.3 ML IM: CPT

## 2021-11-26 PROCEDURE — 0004A PR COVID VAC PFIZER DIL RECON 30 MCG/0.3 ML IM: CPT

## 2022-02-25 ENCOUNTER — TELEPHONE (OUTPATIENT)
Dept: OBGYN | Facility: CLINIC | Age: 42
End: 2022-02-25
Payer: COMMERCIAL

## 2022-02-28 ENCOUNTER — OFFICE VISIT (OUTPATIENT)
Dept: OBGYN | Facility: CLINIC | Age: 42
End: 2022-02-28
Payer: COMMERCIAL

## 2022-02-28 VITALS
WEIGHT: 127.4 LBS | BODY MASS INDEX: 23.88 KG/M2 | SYSTOLIC BLOOD PRESSURE: 96 MMHG | HEART RATE: 79 BPM | DIASTOLIC BLOOD PRESSURE: 60 MMHG

## 2022-02-28 DIAGNOSIS — N93.9 ABNORMAL UTERINE BLEEDING: Primary | ICD-10-CM

## 2022-02-28 LAB
ERYTHROCYTE [DISTWIDTH] IN BLOOD BY AUTOMATED COUNT: 12.3 % (ref 10–15)
HCT VFR BLD AUTO: 31.6 % (ref 35–47)
HGB BLD-MCNC: 10.8 G/DL (ref 11.7–15.7)
MCH RBC QN AUTO: 30.9 PG (ref 26.5–33)
MCHC RBC AUTO-ENTMCNC: 34.2 G/DL (ref 31.5–36.5)
MCV RBC AUTO: 91 FL (ref 78–100)
PLATELET # BLD AUTO: 323 10E3/UL (ref 150–450)
PROLACTIN SERPL-MCNC: 18 UG/L (ref 3–27)
RBC # BLD AUTO: 3.49 10E6/UL (ref 3.8–5.2)
T4 FREE SERPL-MCNC: 0.93 NG/DL (ref 0.76–1.46)
TSH SERPL DL<=0.005 MIU/L-ACNC: 5.76 MU/L (ref 0.4–4)
WBC # BLD AUTO: 6 10E3/UL (ref 4–11)

## 2022-02-28 PROCEDURE — 99214 OFFICE O/P EST MOD 30 MIN: CPT | Mod: 25 | Performed by: OBSTETRICS & GYNECOLOGY

## 2022-02-28 PROCEDURE — 84439 ASSAY OF FREE THYROXINE: CPT | Performed by: OBSTETRICS & GYNECOLOGY

## 2022-02-28 PROCEDURE — 58100 BIOPSY OF UTERUS LINING: CPT | Performed by: OBSTETRICS & GYNECOLOGY

## 2022-02-28 PROCEDURE — 84443 ASSAY THYROID STIM HORMONE: CPT | Performed by: OBSTETRICS & GYNECOLOGY

## 2022-02-28 PROCEDURE — 85027 COMPLETE CBC AUTOMATED: CPT | Performed by: OBSTETRICS & GYNECOLOGY

## 2022-02-28 PROCEDURE — 84146 ASSAY OF PROLACTIN: CPT | Performed by: OBSTETRICS & GYNECOLOGY

## 2022-02-28 PROCEDURE — 36415 COLL VENOUS BLD VENIPUNCTURE: CPT | Performed by: OBSTETRICS & GYNECOLOGY

## 2022-02-28 PROCEDURE — 88305 TISSUE EXAM BY PATHOLOGIST: CPT | Performed by: PATHOLOGY

## 2022-02-28 NOTE — PROGRESS NOTES
"Lorraine is a 41 year old  who is here today with complaint of abnormal uterine bleeding.    She states her menses are usually regular and usually lasts for up to 5 days.  Currently she is on Day 13 and she expresses that it is heavy.  She has been passing clots, quarter size.  This occurs about 5 times a day.   She uses tampons and she changes every 2 hours.  Normally she changes about 4 to 5 hours.   She did not have any bleeding after her booster shot in November.  She did not have any bleeding in December.  She had bleeding that lasted 8 days last month.    She had cramping once that lasted a few minutes.    She has not noted any alleviating factors.    She thinks she might be feeling dizzy today \"head not clear.\"  She is not able to retain what she has read.  No visual problems.  No galactorrhea.   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   She has no known thyroid problems.    Her  has had a vasectomy.     Component      Latest Ref Rng & Units 1/10/2019 2021   WBC      4.0 - 11.0 10e3/uL  5.7   RBC Count      3.80 - 5.20 10e6/uL  4.64   Hemoglobin      11.7 - 15.7 g/dL  14.2   Hematocrit      35.0 - 47.0 %  41.9   MCV      78 - 100 fL  90   MCH      26.5 - 33.0 pg  30.6   MCHC      31.5 - 36.5 g/dL  33.9   RDW      10.0 - 15.0 %  11.7   Platelet Count      150 - 450 10e3/uL  233   TSH      0.40 - 4.00 mU/L 2.34          Past Medical History:   Diagnosis Date     Infertility     clomid pregnancy       Past Surgical History:   Procedure Laterality Date     C/SECTION, LOW TRANSVERSE  09    , Low Transverse       OB History    Para Term  AB Living   2 2 0 2 0 2   SAB IAB Ectopic Multiple Live Births   0 0 0 0 2      # Outcome Date GA Lbr Connor/2nd Weight Sex Delivery Anes PTL Lv   2  09 36w5d  2.325 kg (5 lb 2 oz) M CS-Unspec   SY      Birth Comments: Twin B      Name: Kylah RAMIREZ Robel      Apgar1: 8  Apgar5: 8   1  09 36w5d  2.045 kg " (4 lb 8.1 oz) F CS-Unspec   SY      Birth Comments: Twin A      Name: Alysia Huston      Apgar1: 8  Apgar5: 9       Gynecological History         Patient's last menstrual period was 02/16/2022 (exact date).     No STD/No PID/No IUD      see above HPI        Allergies   Allergen Reactions     Hydrocodone        Current Outpatient Medications   Medication Sig Dispense Refill     CALCIUM PO        Multiple Vitamins-Minerals (MULTIVITAMIN & MINERAL PO)          Social History     Socioeconomic History     Marital status:      Spouse name: Not on file     Number of children: Not on file     Years of education: Not on file     Highest education level: Not on file   Occupational History     Occupation: Paraprofessional   Tobacco Use     Smoking status: Never Smoker     Smokeless tobacco: Never Used   Vaping Use     Vaping Use: Never used   Substance and Sexual Activity     Alcohol use: No     Drug use: No     Sexual activity: Yes     Partners: Male     Birth control/protection: Male Surgical     Comment: vasectomy   Other Topics Concern      Service No     Blood Transfusions No     Caffeine Concern No     Occupational Exposure No     Hobby Hazards Yes     Comment: down hill ski     Sleep Concern No     Stress Concern No     Weight Concern No     Special Diet Yes     Comment: eat healthy (not a milk drinker drink calcium OJ     Back Care Yes     Comment: Left upper and Left shoulder     Exercise Yes     Bike Helmet Not Asked     Comment: N/A     Seat Belt Yes     Self-Exams Yes     Parent/sibling w/ CABG, MI or angioplasty before 65F 55M? No   Social History Narrative    November 8, 2018    ENVIRONMENTAL HISTORY: The family [place] The home is heated with a forced air. They do have central air conditioning. The patient's bedroom is furnished with carpeting in bedroom. No pets inside the house. There is no history of cockroach or mice infestation. There are no  smokers in the house.  The house does not have a damp basement.      Social Determinants of Health     Financial Resource Strain: Not on file   Food Insecurity: Not on file   Transportation Needs: Not on file   Physical Activity: Not on file   Stress: Not on file   Social Connections: Not on file   Intimate Partner Violence: Not on file   Housing Stability: Not on file       Family History   Problem Relation Age of Onset     Diabetes Maternal Grandmother      Heart Disease Father      Hypertension Father      Diabetes Father      Cancer Maternal Grandfather         lung     Deep Vein Thrombosis Paternal Grandmother      Hypertension Paternal Grandmother          Review of Systems:  10 point ROS of systems including Constitutional, Eyes, Respiratory, Cardiovascular, Gastroenterology, Genitourinary, Integumentary, Muscularskeletal, Psychiatric were all negative except for pertinent positives noted in my HPI and in the PMH.          EXAM:  BP 96/60   Pulse 79   Wt 57.8 kg (127 lb 6.4 oz)   LMP 02/16/2022 (Exact Date)   Breastfeeding No   BMI 23.88 kg/m    Body mass index is 23.88 kg/m .  General Appearance:  healthy, alert, active, no distress  Skin:  Normal skin turgor  Neuro:  Alert, cranial nerves grossly intact  HEENT: NCAT  Neck:  No masses or lesions carotids are +2/4. No bruits heard  Lungs:  Good respiratory effort   Abdomen: Soft, nontender.  Normal bowel sounds.  No masses  Vulva: No external lesions, normal hair distribution, no adenopathy  BUS:  Normal, no masses noted  Urethral meatus:  No masses noted  Urethra:  No hypermobility  Vagina: Moist, pink, no abnormal discharge, well rugated, no lesions  Cervix: Smooth, pink, no visible lesions  Uterus: Normal size, anteverted, non-tender, mobile  Ovaries: No mass, non-tender, mobile  Extremities:  No clubbing, cyanosis or edema.        ASSESSMENT:  Abnormal uterine bleeding      PLAN:  - Embx   - Pelvic U/S  - Check TSH, CBC, Prolactin level   We  discussed the bleeding profiles as people age and approach menopause.  Even though menstrual changes and irregularities are common and expected, they may also indicate or precipitate endometrial abnormalities.   The patient and I discussed the options for evaluation.  The EMB, SIS and the D&C were reviewed with her.  The EMB will not remove a polyp, but will give a tissue sample.  The SIS will further evaluate the lining, but no tissue sample, and should she have a suspected polyp, it would not be removed.  The D&C is more expensive but is currently the gold standard.    Together we reviewed the risks and benefits of medical versus surgical therapy.  Medical therapy reviewed included hormonal manipulation with OCP's, Patch, Ring, Depo, or IUD.  We reviewed endometrial ablation versus hysterectomy.  Discussed that endometrial ablation is minimally invasive compared to hysterectomy but may not be definitive.    She will think  About these options and decide once more information is available, from the labs and ultrasound, once back.     Total time preparing to see patient with reviewing prior encounter and labs, face to face time,  and coordinating care on the same calendar date: 35 minutes, in addition to the time needed for the procedure below.       Soren Parekh MD        PROCEDURE NOTE:  The procedure was reviewed with patient.  Her  has had a vasectomy.  After consenting to the procedure she was placed into the dorsal lithotomy position.  The examination was performed.  The speculum was placed into the vagina.  The cervix was prepped with Betadine.  The anterior lip of the cervix was grasped with the Allis tenaculum.  The uterus was sounded to 7 cm.  The Pipelle was used to sample the endometrium.  5 passes were made.   Instruments were removed and the specimen was sent to pathology.  Results to the patient in 1-2 weeks when they are returned.      Soren Parekh MD

## 2022-03-01 ENCOUNTER — MYC MEDICAL ADVICE (OUTPATIENT)
Dept: OBGYN | Facility: CLINIC | Age: 42
End: 2022-03-01
Payer: COMMERCIAL

## 2022-03-01 RX ORDER — LEVOTHYROXINE SODIUM 25 UG/1
25 TABLET ORAL DAILY
Qty: 60 TABLET | Refills: 0 | Status: SHIPPED | OUTPATIENT
Start: 2022-03-01 | End: 2022-08-24

## 2022-03-02 LAB
PATH REPORT.COMMENTS IMP SPEC: NORMAL
PATH REPORT.COMMENTS IMP SPEC: NORMAL
PATH REPORT.FINAL DX SPEC: NORMAL
PATH REPORT.GROSS SPEC: NORMAL
PATH REPORT.MICROSCOPIC SPEC OTHER STN: NORMAL
PATH REPORT.RELEVANT HX SPEC: NORMAL
PHOTO IMAGE: NORMAL

## 2022-03-03 ENCOUNTER — ANCILLARY PROCEDURE (OUTPATIENT)
Dept: ULTRASOUND IMAGING | Facility: CLINIC | Age: 42
End: 2022-03-03
Attending: OBSTETRICS & GYNECOLOGY
Payer: COMMERCIAL

## 2022-03-03 DIAGNOSIS — N93.9 ABNORMAL UTERINE BLEEDING: ICD-10-CM

## 2022-03-03 PROCEDURE — 76830 TRANSVAGINAL US NON-OB: CPT | Performed by: RADIOLOGY

## 2022-03-03 NOTE — TELEPHONE ENCOUNTER
"Per 2/28 lab result note:  \"I attempted to call you, but you are likely in class.  I did not leave a message.   The Prolactin level is in normal range and no further testing is suggested at this time.    The hemoglobin result is a little low.  I would recommend to take additional iron tablets.  An over the counter formulation would be ok.  Start with one a day.  You may use stool softeners if you become constipated with the iron supplements.  The Free T4 is normal, but the TSH is a little elevated.  I suggest to start with a lose dose of levothyroxine and then redraw the TSH in about 6 weeks.  I will send a prescription to the pharmacy.  Please schedule the lab appointment for the TSH level at that time.\"  "

## 2022-03-06 ENCOUNTER — OFFICE VISIT (OUTPATIENT)
Dept: URGENT CARE | Facility: URGENT CARE | Age: 42
End: 2022-03-06
Payer: COMMERCIAL

## 2022-03-06 VITALS
SYSTOLIC BLOOD PRESSURE: 102 MMHG | OXYGEN SATURATION: 100 % | TEMPERATURE: 97.9 F | HEART RATE: 77 BPM | DIASTOLIC BLOOD PRESSURE: 68 MMHG

## 2022-03-06 DIAGNOSIS — R30.0 DYSURIA: Primary | ICD-10-CM

## 2022-03-06 DIAGNOSIS — R31.29 MICROSCOPIC HEMATURIA: ICD-10-CM

## 2022-03-06 LAB
ALBUMIN UR-MCNC: NEGATIVE MG/DL
APPEARANCE UR: ABNORMAL
BACTERIA #/AREA URNS HPF: ABNORMAL /HPF
BILIRUB UR QL STRIP: NEGATIVE
CLUE CELLS: ABNORMAL
COLOR UR AUTO: YELLOW
GLUCOSE UR STRIP-MCNC: NEGATIVE MG/DL
HGB UR QL STRIP: ABNORMAL
KETONES UR STRIP-MCNC: NEGATIVE MG/DL
LEUKOCYTE ESTERASE UR QL STRIP: ABNORMAL
NITRATE UR QL: NEGATIVE
PH UR STRIP: 5.5 [PH] (ref 5–7)
RBC #/AREA URNS AUTO: ABNORMAL /HPF
SP GR UR STRIP: 1.02 (ref 1–1.03)
SQUAMOUS #/AREA URNS AUTO: ABNORMAL /LPF
TRICHOMONAS, WET PREP: ABNORMAL
UROBILINOGEN UR STRIP-ACNC: 0.2 E.U./DL
WBC #/AREA URNS AUTO: ABNORMAL /HPF
WBC'S/HIGH POWER FIELD, WET PREP: ABNORMAL
YEAST, WET PREP: ABNORMAL

## 2022-03-06 PROCEDURE — 99213 OFFICE O/P EST LOW 20 MIN: CPT | Performed by: PHYSICIAN ASSISTANT

## 2022-03-06 PROCEDURE — 87210 SMEAR WET MOUNT SALINE/INK: CPT | Performed by: PHYSICIAN ASSISTANT

## 2022-03-06 PROCEDURE — 81001 URINALYSIS AUTO W/SCOPE: CPT | Performed by: PHYSICIAN ASSISTANT

## 2022-03-06 PROCEDURE — 87186 SC STD MICRODIL/AGAR DIL: CPT | Performed by: PHYSICIAN ASSISTANT

## 2022-03-06 PROCEDURE — 87086 URINE CULTURE/COLONY COUNT: CPT | Performed by: PHYSICIAN ASSISTANT

## 2022-03-06 RX ORDER — NITROFURANTOIN 25; 75 MG/1; MG/1
100 CAPSULE ORAL 2 TIMES DAILY
Qty: 14 CAPSULE | Refills: 0 | Status: SHIPPED | OUTPATIENT
Start: 2022-03-06 | End: 2022-03-13

## 2022-03-06 ASSESSMENT — ENCOUNTER SYMPTOMS
SHORTNESS OF BREATH: 0
DIARRHEA: 0
WHEEZING: 0
HEARTBURN: 0
NAUSEA: 0
COUGH: 0
GASTROINTESTINAL NEGATIVE: 1
FLANK PAIN: 0
DYSURIA: 0
CARDIOVASCULAR NEGATIVE: 1
PALPITATIONS: 0
CONSTIPATION: 0
FEVER: 0
CHILLS: 0
HEMATOCHEZIA: 0
HEMATURIA: 0
VOMITING: 0
FREQUENCY: 0
ABDOMINAL PAIN: 0
RESPIRATORY NEGATIVE: 1
FATIGUE: 0
CHEST TIGHTNESS: 0

## 2022-03-06 NOTE — PROGRESS NOTES
Carroll Peters is a 41 year old who presents for the following health issues   HPI   Genitourinary - Female  Onset/Duration: today  Description:   Painful urination (Dysuria): YES           Frequency: YES  Blood in urine (Hematuria): no  Delay in urine (Hesitency): no  Intensity: mild  Progression of Symptoms:  same  Accompanying Signs & Symptoms:  Fever/chills: no  Flank pain: no  Nausea and vomiting: no  Vaginal symptoms: none  Abdominal/Pelvic Pain: no  History:   History of frequent UTI s: no  History of kidney stones: no  Sexually Active: YES  Possibility of pregnancy: No, menses completed 2days ago  Precipitating or alleviating factors: None  Therapies tried and outcome: none    Patient Active Problem List   Diagnosis   (none) - all problems resolved or deleted     Current Outpatient Medications   Medication     CALCIUM PO     levothyroxine (SYNTHROID/LEVOTHROID) 25 MCG tablet     Multiple Vitamins-Minerals (MULTIVITAMIN & MINERAL PO)     No current facility-administered medications for this visit.        Allergies   Allergen Reactions     Hydrocodone        Review of Systems   Constitutional: Negative for chills, fatigue and fever.   Respiratory: Negative.  Negative for cough, chest tightness, shortness of breath and wheezing.    Cardiovascular: Negative.  Negative for chest pain, palpitations and peripheral edema.   Gastrointestinal: Negative.  Negative for abdominal pain, constipation, diarrhea, heartburn, hematochezia, nausea and vomiting.   Genitourinary: Negative for dysuria, flank pain, frequency, hematuria, pelvic pain, urgency, vaginal bleeding and vaginal discharge.   All other systems reviewed and are negative.           Objective    /68   Pulse 77   Temp 97.9  F (36.6  C) (Tympanic)   LMP 02/16/2022 (Exact Date)   SpO2 100%   There is no height or weight on file to calculate BMI.  Physical Exam  Vitals and nursing note reviewed.   Constitutional:       General: She is not in acute  distress.     Appearance: Normal appearance. She is well-developed and normal weight. She is not ill-appearing.   Cardiovascular:      Rate and Rhythm: Normal rate and regular rhythm.      Pulses: Normal pulses.      Heart sounds: Normal heart sounds, S1 normal and S2 normal. No murmur heard.    No friction rub. No gallop.   Pulmonary:      Effort: Pulmonary effort is normal. No accessory muscle usage or respiratory distress.      Breath sounds: Normal breath sounds and air entry. No decreased breath sounds, wheezing, rhonchi or rales.   Abdominal:      General: Abdomen is protuberant. Bowel sounds are normal.      Palpations: Abdomen is soft. There is no hepatomegaly, splenomegaly or mass.      Tenderness: There is no abdominal tenderness. There is no right CVA tenderness, left CVA tenderness, guarding or rebound. Negative signs include Edwards's sign, Rovsing's sign, McBurney's sign, psoas sign and obturator sign.      Hernia: No hernia is present.   Genitourinary:     Comments: Declined pelvic exam.  Skin:     General: Skin is warm and dry.   Neurological:      Mental Status: She is alert and oriented to person, place, and time.   Psychiatric:         Mood and Affect: Mood normal.         Behavior: Behavior normal.         Thought Content: Thought content normal.         Judgment: Judgment normal.       Results for orders placed or performed in visit on 03/06/22 (from the past 24 hour(s))   UA macro with reflex to Microscopic and Culture - Clinc Collect    Specimen: Urine, Clean Catch   Result Value Ref Range    Color Urine Yellow Colorless, Straw, Light Yellow, Yellow    Appearance Urine Cloudy (A) Clear    Glucose Urine Negative Negative mg/dL    Bilirubin Urine Negative Negative    Ketones Urine Negative Negative mg/dL    Specific Gravity Urine 1.025 1.003 - 1.035    Blood Urine Large (A) Negative    pH Urine 5.5 5.0 - 7.0    Protein Albumin Urine Negative Negative mg/dL    Urobilinogen Urine 0.2 0.2, 1.0  E.U./dL    Nitrite Urine Negative Negative    Leukocyte Esterase Urine Small (A) Negative   Wet prep - Clinic Collect    Specimen: Vagina; Swab   Result Value Ref Range    Trichomonas Absent Absent    Yeast Absent Absent    Clue Cells Absent Absent    WBCs/high power field 2+ (A) None   Urine Microscopic   Result Value Ref Range    Bacteria Urine Few (A) None Seen /HPF    RBC Urine  (A) 0-2 /HPF /HPF    WBC Urine 0-5 0-5 /HPF /HPF    Squamous Epithelials Urine Few (A) None Seen /LPF    Narrative    Urine Culture not indicated         Assessment/Plan:  Dysuria:  UA and micro is suspicious for UTI and will empirically treat with [id number].  Will send for UC to help guide abx treatment.  Recommend increase fluids, regular voids, proper wiping techniques and voiding after intercourse.  Educated patient on warning signs of kidney infection and to go to the ER if she develops any of these symptoms.  Recheck in clinic if symptoms worsen or if symptoms do not improve.  -     UA macro with reflex to Microscopic and Culture - Clinc Collect  -     Wet prep - Clinic Collect  -     Urine Microscopic  -     Urine Culture Aerobic Bacterial - lab collect; Future  -     nitroFURantoin macrocrystal-monohydrate (MACROBID) 100 MG capsule; Take 1 capsule (100 mg) by mouth 2 times daily for 7 days    Microscopic hematuria:  She just recently completed her menses.  F/u with PCP or urology for further evaluation and management.  -     Adult Urology Referral        Marina Rosa PA-C

## 2022-03-07 ENCOUNTER — TELEPHONE (OUTPATIENT)
Dept: OBGYN | Facility: CLINIC | Age: 42
End: 2022-03-07
Payer: COMMERCIAL

## 2022-03-07 ENCOUNTER — MYC MEDICAL ADVICE (OUTPATIENT)
Dept: OBGYN | Facility: CLINIC | Age: 42
End: 2022-03-07
Payer: COMMERCIAL

## 2022-03-07 DIAGNOSIS — R31.29 MICROSCOPIC HEMATURIA: Primary | ICD-10-CM

## 2022-03-07 NOTE — TELEPHONE ENCOUNTER
Pt is asking about her US from 3/3 and the EMB results from 2/28.  Routing to Dr. Parekh to review and advise both results.    Melani Abdul RN

## 2022-03-07 NOTE — TELEPHONE ENCOUNTER
Please see [facility] Healt[facility] message.    Patient has lab appointment on 3.16.22.    Will route to provider.    Tono Hopper CMA

## 2022-03-07 NOTE — TELEPHONE ENCOUNTER
Reason for Call: Request for an order or referral:    Order or referral being requested: Urine sample    Date needed: before my next appointment    Has the patient been seen by the PCP for this problem? YES    Additional comments: Patient was advised to recheck urine in a week to check for blood. Please place order    Phone number Patient can be reached at:  Home number on file [phone] (home)    Best Time:  any    Can we leave a detailed message on this number?  YES    Call taken on 3/7/2022 at 4:46 PM by Keturah Washburn

## 2022-03-08 LAB — BACTERIA UR CULT: ABNORMAL

## 2022-03-08 NOTE — TELEPHONE ENCOUNTER
I called patient twice today, in early am and now.  I reached voice mail each time.   I left the message I will place an  Order for the repeat urinalysis.   Soren Parekh MD

## 2022-03-16 ENCOUNTER — LAB (OUTPATIENT)
Dept: LAB | Facility: CLINIC | Age: 42
End: 2022-03-16
Payer: COMMERCIAL

## 2022-03-16 DIAGNOSIS — R31.29 MICROSCOPIC HEMATURIA: ICD-10-CM

## 2022-03-16 LAB
ALBUMIN UR-MCNC: NEGATIVE MG/DL
APPEARANCE UR: CLEAR
BACTERIA #/AREA URNS HPF: ABNORMAL /HPF
BILIRUB UR QL STRIP: NEGATIVE
COLOR UR AUTO: YELLOW
GLUCOSE UR STRIP-MCNC: NEGATIVE MG/DL
HGB UR QL STRIP: ABNORMAL
KETONES UR STRIP-MCNC: NEGATIVE MG/DL
LEUKOCYTE ESTERASE UR QL STRIP: NEGATIVE
NITRATE UR QL: NEGATIVE
PH UR STRIP: 7 [PH] (ref 5–7)
RBC #/AREA URNS AUTO: ABNORMAL /HPF
SP GR UR STRIP: 1.02 (ref 1–1.03)
UROBILINOGEN UR STRIP-ACNC: 0.2 E.U./DL
WBC #/AREA URNS AUTO: ABNORMAL /HPF

## 2022-03-16 PROCEDURE — 81001 URINALYSIS AUTO W/SCOPE: CPT

## 2022-03-17 ENCOUNTER — MYC MEDICAL ADVICE (OUTPATIENT)
Dept: OBGYN | Facility: CLINIC | Age: 42
End: 2022-03-17
Payer: COMMERCIAL

## 2022-03-22 ENCOUNTER — MYC MEDICAL ADVICE (OUTPATIENT)
Dept: OBGYN | Facility: CLINIC | Age: 42
End: 2022-03-22
Payer: COMMERCIAL

## 2022-03-23 NOTE — TELEPHONE ENCOUNTER
Pt had urine test done on 3/16, came back normal, pt asking if she should be concerned regarding the trace amount of blood seen on test.    RN routing to provider to advise.    Jordyn Shore RN on 3/23/2022 at 7:25 AM

## 2022-03-23 NOTE — TELEPHONE ENCOUNTER
That was on dip stick and as such, is not blood.   Other substances may change the dip stick reading.  The micro is what counts, and that is normal.   Soren Parekh MD

## 2022-03-29 ENCOUNTER — OFFICE VISIT (OUTPATIENT)
Dept: OBGYN | Facility: CLINIC | Age: 42
End: 2022-03-29
Payer: COMMERCIAL

## 2022-03-29 VITALS
DIASTOLIC BLOOD PRESSURE: 68 MMHG | WEIGHT: 127.8 LBS | BODY MASS INDEX: 23.95 KG/M2 | HEART RATE: 72 BPM | OXYGEN SATURATION: 98 % | SYSTOLIC BLOOD PRESSURE: 105 MMHG

## 2022-03-29 DIAGNOSIS — E03.9 HYPOTHYROIDISM, UNSPECIFIED TYPE: ICD-10-CM

## 2022-03-29 DIAGNOSIS — D50.0 IRON DEFICIENCY ANEMIA DUE TO CHRONIC BLOOD LOSS: ICD-10-CM

## 2022-03-29 DIAGNOSIS — N93.9 ABNORMAL UTERINE BLEEDING: Primary | ICD-10-CM

## 2022-03-29 PROCEDURE — 99215 OFFICE O/P EST HI 40 MIN: CPT | Performed by: OBSTETRICS & GYNECOLOGY

## 2022-03-30 ENCOUNTER — TELEPHONE (OUTPATIENT)
Dept: ENDOCRINOLOGY | Facility: CLINIC | Age: 42
End: 2022-03-30
Payer: COMMERCIAL

## 2022-03-30 NOTE — TELEPHONE ENCOUNTER
Per Dr Alcazar, add patient onto schedule 4/13/22 at 4:30pm.  Writer called and left voice message to call back.  Please confirm that appointment date and time works for patient.    Benny Guerra CMA  Adult Endocrinology  [facility]

## 2022-03-31 DIAGNOSIS — R94.6 ABNORMAL FINDING ON THYROID FUNCTION TEST: Primary | ICD-10-CM

## 2022-03-31 NOTE — TELEPHONE ENCOUNTER
[facility] Call Center    Phone Message    May a detailed message be left on voicemail: yes     Reason for Call: Other: Pt called in and confirmed date and time will work for appt.      Action Taken: Other: Endo    Travel Screening: Not Applicable

## 2022-04-01 NOTE — TELEPHONE ENCOUNTER
Appointment has been scheduled with scheduling. Closing encounter.    Benny Guerra CMA  Adult Endocrinology  [facility]

## 2022-04-06 ENCOUNTER — MYC MEDICAL ADVICE (OUTPATIENT)
Dept: OBGYN | Facility: CLINIC | Age: 42
End: 2022-04-06

## 2022-04-06 ENCOUNTER — LAB (OUTPATIENT)
Dept: LAB | Facility: CLINIC | Age: 42
End: 2022-04-06
Payer: COMMERCIAL

## 2022-04-06 DIAGNOSIS — R94.6 ABNORMAL FINDING ON THYROID FUNCTION TEST: ICD-10-CM

## 2022-04-06 DIAGNOSIS — N93.9 ABNORMAL UTERINE BLEEDING: Primary | ICD-10-CM

## 2022-04-06 LAB
ERYTHROCYTE [DISTWIDTH] IN BLOOD BY AUTOMATED COUNT: 12.1 % (ref 10–15)
HCT VFR BLD AUTO: 38.2 % (ref 35–47)
HGB BLD-MCNC: 12.5 G/DL (ref 11.7–15.7)
MCH RBC QN AUTO: 30.3 PG (ref 26.5–33)
MCHC RBC AUTO-ENTMCNC: 32.7 G/DL (ref 31.5–36.5)
MCV RBC AUTO: 93 FL (ref 78–100)
PLATELET # BLD AUTO: 271 10E3/UL (ref 150–450)
RBC # BLD AUTO: 4.13 10E6/UL (ref 3.8–5.2)
WBC # BLD AUTO: 5.5 10E3/UL (ref 4–11)

## 2022-04-06 PROCEDURE — 86376 MICROSOMAL ANTIBODY EACH: CPT

## 2022-04-06 PROCEDURE — 84443 ASSAY THYROID STIM HORMONE: CPT

## 2022-04-06 PROCEDURE — 85027 COMPLETE CBC AUTOMATED: CPT

## 2022-04-06 PROCEDURE — 84439 ASSAY OF FREE THYROXINE: CPT

## 2022-04-06 PROCEDURE — 36415 COLL VENOUS BLD VENIPUNCTURE: CPT

## 2022-04-06 NOTE — TELEPHONE ENCOUNTER
"Last seen for \"recheck\" on 3/29/22. Provider OV notes not available.    Pt requesting a medication to help stop heavy vaginal bleeding. Pt states is on day 6 of period. Has switched to using a menstrual cup. Yesterday bled 110ml and the day before was 60ml. It has been 40ml so far this morning. I am starting to worry about the amount of blood loss.      Pt asks if is prescribed a birth control to help w/bleeding, when should she start it.    ED precautions given.    [facility]    Routing to provider for advice.       "

## 2022-04-06 NOTE — TELEPHONE ENCOUNTER
I called patient and I discussed the options for bleeding management.  We reviewed progestin only products, combination products, and estrogen only products.  After the discussion, she elects to have the combination OCP and to use it every 6 hours for 5.25 days. Wait for the bleeding, which will be very heavy, and on first day of bleeding, start a new pack of pills, one tablet every day.    She voices understanding.

## 2022-04-07 ENCOUNTER — MYC MEDICAL ADVICE (OUTPATIENT)
Dept: OBGYN | Facility: CLINIC | Age: 42
End: 2022-04-07
Payer: COMMERCIAL

## 2022-04-07 LAB
T4 FREE SERPL-MCNC: 0.97 NG/DL (ref 0.76–1.46)
THYROPEROXIDASE AB SERPL-ACNC: 496 IU/ML
TSH SERPL DL<=0.005 MIU/L-ACNC: 2.11 MU/L (ref 0.4–4)

## 2022-04-07 NOTE — TELEPHONE ENCOUNTER
Pt last seen 3/29/2022, pt prescribed norgestrel-ethinyl estradiol (LO/OVRAL) 0.3-30 MG-MCG tablet : Take 1 active pill every 6 hours.  Once completed, discard inactive pills.  Wait for bleeding to start, then start new pack, 1 tablet daily.    Jordyn Shore RN on 4/7/2022 at 12:30 PM

## 2022-04-13 ENCOUNTER — OFFICE VISIT (OUTPATIENT)
Dept: ENDOCRINOLOGY | Facility: CLINIC | Age: 42
End: 2022-04-13
Payer: COMMERCIAL

## 2022-04-13 VITALS
SYSTOLIC BLOOD PRESSURE: 102 MMHG | DIASTOLIC BLOOD PRESSURE: 67 MMHG | HEART RATE: 63 BPM | WEIGHT: 127 LBS | HEIGHT: 62 IN | BODY MASS INDEX: 23.37 KG/M2 | OXYGEN SATURATION: 100 %

## 2022-04-13 DIAGNOSIS — E06.3 HASHIMOTO'S THYROIDITIS: Primary | ICD-10-CM

## 2022-04-13 PROCEDURE — 99205 OFFICE O/P NEW HI 60 MIN: CPT | Performed by: INTERNAL MEDICINE

## 2022-04-13 NOTE — PROGRESS NOTES
Endocrinology Note         Lorraine is a 42 year old female presents today for abnormal thyroid function test.    HPI  Lorraine is a 42 year old female presents today for abnormal thyroid function test    She reports having heavy menstrual cycle starting in February 2022. Prior to this, her menstrual cycle was normal and lasted for 5 days. Over the past 2 months, her period lasted longer up to 10+days. She saw OB-GYN and work-up showed TSH of 5.76 and FT4 0.93. Hb was low at 10 gram. She was prescribed birth control pill and iron supplement.     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 She has been sleeping late to finish her assignments. She feels a little more fatigue in the past few months. She reports receiving Pfizer booster shot in December 2021 and is wondering whether it caused heavy period or not. She denied weight gain, cold intolerance, skin changes, chest pain, SOB, palpitation, altered bowel movement.     Repeat lab 4/6/2022 showed TSH 2.11, FT4 0.97. TPO Ab is positive at 496.    She denied family history of thyroid disease or autoimmune diseases.    Past Medical History  Past Medical History:   Diagnosis Date     Infertility     clomid pregnancy       Allergies  Allergies   Allergen Reactions     Hydrocodone      Medications  Current Outpatient Medications   Medication Sig Dispense Refill     CALCIUM PO        ferrous sulfate 140 (45 Fe) MG TBCR CR tablet Take 140 mg by mouth daily       levothyroxine (SYNTHROID/LEVOTHROID) 25 MCG tablet Take 1 tablet (25 mcg) by mouth daily 60 tablet 0     Multiple Vitamins-Minerals (MULTIVITAMIN & MINERAL PO)        norgestrel-ethinyl estradiol (LO/OVRAL) 0.3-30 MG-MCG tablet Take 1 active pill every 6 hours.  Once completed, discard inactive pills.  Wait for bleeding to start, then start new pack, 1 tablet daily. 84 tablet 1     Family History  family history includes Cancer in her maternal grandfather;  "Deep Vein Thrombosis in her paternal grandmother; Diabetes in her father and maternal grandmother; Heart Disease in her father; Hypertension in her father and paternal grandmother.     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  Constitutional: no distress, comfortable, pleasant   Eyes: anicteric, normal extra-ocular movements, no lid lag or retraction  Neck: no thyromegaly, no discrete nodule  Musculoskeletal: no edema   Skin: no jaundice   Neurological: cranial nerves intact, normal gait, no tremor on outstretched hands bilaterally  Psychological: appropriate mood   Lymphatic: no cervical  lymphadenopathy      RESULTS  I have personally reviewed labs and images. I also reviewed labs with patient and discussed the result and plan of care.        ASSESSMENT:    Lorraine is a 42 year old female presents today for abnormal thyroid function test    1) Hashimoto's thyroiditis: her lab is consistent with Hashimoto's thyroiditis given positive TPO Ab. Her previous TFT were normal. However, in the past 2 months, she has had heavy menstrual cycle and found to have mildly elevated TSH with normal FT4. TSH returned normal with repeated lab.   - I have counseled her regarding diagnosis and prognosis  - since her TSH returned normal after one random mildly elevated TSH, I would not treat with thyroid replacement. I recommend to repeat lab in 4-6 months or sooner if clinically " indicated.    PLAN:   - recommend to repeat TFT again in 4-6 months or sooner if clinically indicated    External notes/medical records independently reviewed, labs and imaging independently reviewed, medical management and tests to be discussed/communicated to patient.    Time: I spent 60 minutes spent on the date of the encounter preparing to see patient (including chart review and preparation), obtaining and or reviewing additional medical history, performing a physical exam and evaluation, documenting clinical information in the electronic health record, independently interpreting results, communicating results to the patient and coordinating care.    Ottoniel Stewart MD  Division of Diabetes and Endocrinology  Department of Medicine  [phone]

## 2022-04-13 NOTE — NURSING NOTE
"Lorraine Huston's goals for this visit include:   Chief Complaint   Patient presents with     New Patient     Consult     Menstrual bleeding, discuss Levothyroxine prior to taking     She requests these members of her care team be copied on today's visit information: No    PCP: Manjinder Marie (Inactive)    Referring Provider:  No referring provider defined for this encounter.    /67 (BP Location: Left arm, Patient Position: Sitting, Cuff Size: Adult Regular)   Pulse 63   Ht 1.562 m (5' 1.5\")   Wt 57.6 kg (127 lb)   LMP 02/16/2022 (Exact Date)   SpO2 100%   BMI 23.61 kg/m      Do you need any medication refills at today's visit? No    Benny Guerra [facility]  Adult Endocrinology  [facility]    "

## 2022-06-02 NOTE — PATIENT INSTRUCTIONS
Patient Education   Patient Education     Restart over the counter Claritin or Zyrtec   Seasonal Allergy  Seasonal allergy is also called hay fever. It may occur after a person is exposed to pollens released from grasses, weeds, trees and shrubs. This type of allergy occurs during the spring and summer when the pollen contacts the lining of the nose, eyes, eyelids, sinuses and throat. This causes histamine to be released from the tissues. Histamine causes itching and swelling. This may produce a watery discharge from the eyes or nose. Violent sneezing, nasal congestion, post-nasal drip, itching of the eyes, nose, throat and mouth, scratchy throat, and dry cough may also occur.  Home care  Seasonal allergy cannot be cured, but symptoms can be reduced by these measures:    Stay away from or limit your time near the allergen as much as you can:    ? Stay indoors on windy days of pollen season.   ? Keep windows and doors closed. Use air conditioning instead in your home and car. This filters the air.  ? Change air conditioner filters often.  ? Take a shower, wash your hair, and change clothes after being outdoors.  ? Put on a NIOSH-rated 95 filter mask when working outdoors. Before going outside, take your allergy medicine as advised by your healthcare provider.    Decongestant pills and sprays reduce tissue swelling and watery discharge. Overuse of nasal decongestant sprays may make symptoms worse. Do not use these more often than recommended. Sometimes you can experience a rebound effect (symptoms worsen), when stopping them. Talk to your healthcare provider or pharmacist about these medicines before taking them, especially if you have high blood pressure or heart problems.     Antihistamines block the release of histamine during the allergic response. They work better when taken before symptoms develop. Unless a prescription antihistamine was prescribed, you can take over-the-counter antihistamines that do not cause  drowsiness.  Ask your pharmacist for suggestions.    Steroid nasal sprays or oral steroids may also be prescribed for more severe symptoms. These help to reduce the local inflammation that can add to the allergic response.    If you have asthma, pollen season may make your asthma symptoms worse. It is important that you use your asthma medicines as directed during this time to prevent or treat attacks. Some persons with asthma have asthma symptoms that get worse when they take antihistamines. This is due to the drying effect on the lungs. If you notice this, stop the antihistamines, drink extra fluids and notify your doctor.    If you have sinus congestion or drainage, a saline nasal rinse may give relief. A saline nasal rinse lessens the swelling and clears excess mucus. This allows sinuses to drain. Prepackaged kits are sold at most drug stores. These contain pre-mixed salt packets and an irrigation device.  Follow-up care  Follow up with your healthcare provider, or as advised. If you have been referred to a specialist, make an appointment promptly.  When to seek medical advice  Call your healthcare provider right away for any of the following:    Facial, ear or sinus pain; colored drainage from the nose    Headaches    You have asthma and your asthma symptoms do not respond to the usual doses of your medicine    Cough with colored sputum (mucus)    Fever of 100.4 F (38 C) or higher, or as directed by the healthcare provider  Call 911  Call 911 if any of these occur:    Trouble breathing or swallowing, wheezing    Hoarse voice, trouble speaking, or drooling    Confusion    Very drowsy or trouble awakening    Fainting or loss of consciousness    Rapid heart rate, or weak pulse    Low blood pressure    Feeling of doom    Nausea, vomiting, abdominal pain, diarrhea    Vomiting blood, or large amounts of blood in stool    Seizure    Cold, moist, or pale (blue in color) skin  Date Last Reviewed: 5/1/2017 2000-2018  [employer]. [address] [place] [address], [place] [address]. All rights reserved. This information is not intended as a substitute for professional medical care. Always follow your healthcare professional's instructions.                     Enbrel Pregnancy And Lactation Text: This medication is Pregnancy Category B and is considered safe during pregnancy. It is unknown if this medication is excreted in breast milk.

## 2022-06-20 NOTE — PROGRESS NOTES
Lorraine Huston is a 42 year old female, .  Her LMP started 2022 and stopped.  It was heavy. She is here today for follow up regarding abnormal uterine bleeding, for which I saw her the first time last month.   Her abnormal bleeding started after the COVID booster shot in November.   She did not have any bleeding in December and then in January, the bleeding lasted 8 days.  Usually her bleeding lasts no more than 5 days.  Her TSH last month was elevated with a normal Free T4.  She was also noted to have a low hgb.  She has been taking the Fe supplements.      Component      Latest Ref Rng & Units 2021   WBC      4.0 - 11.0 10e3/uL 5.7 6.0   RBC Count      3.80 - 5.20 10e6/uL 4.64 3.49 (L)   Hemoglobin      11.7 - 15.7 g/dL 14.2 10.8 (L)   Hematocrit      35.0 - 47.0 % 41.9 31.6 (L)   Platelet Count      150 - 450 10e3/uL 233 323   TSH      0.40 - 4.00 mU/L  5.76 (H)   Prolactin      3 - 27 ug/L  18   T4 Free      0.76 - 1.46 ng/dL  0.93       She also had a UTI and she took antibiotics.  Her repeat U/A was negative.      She had the EMB last month and the biopsy did not show concerning findings.  We discussed the disordered part indicates the endometrium is growing and shedding at different rates and medical management may stabilize the lining and shed it at same time.    Final Diagnosis   Uterus, endometrium: Biopsy:  - Disordered proliferative endometrium with extensive glandular and stromal breakdown, negative for hyperplasia and atypia        The patient's medical history, social history and family history are reviewed and updates made as indicated.       Review of Systems:  10 point ROS of systems including Constitutional, Eyes, Respiratory, Cardiovascular, Gastroenterology, Genitourinary, Integumentary, Muscularskeletal, Psychiatric were all negative except for pertinent positives noted in my HPI and in the PMH.      Exam  /68 (BP Location: Right arm, Cuff Size: Adult Regular)    Pulse 72   Wt 58 kg (127 lb 12.8 oz)   LMP 02/16/2022 (Exact Date)   SpO2 98%   Breastfeeding No   BMI 23.95 kg/m    General:  WNWD female, NAD  Alert  Oriented x 3  Gait:  Normal  Skin:  Normal skin turgor  HEENT:  NC/AT, EOMI  Abdomen:  Non-tender, non-distended.  Pelvic exam:  Not performed  Extremities:  No clubbing, no cyanosis and no edema.      Assessment  Abnormal uterine bleeding  Anemia  Hypothyroidism        Plan  She would like to see Endocrine about the thyroid and I agree.    She has an appointment with Urology scheduled tomorrow.    We discussed bleeding management with hormones, D&C, and expectant management.   She will think about the options.        Time: I spent 50 minutes spent on the date of the encounter preparing to see patient (including chart review and preparation), obtaining and or reviewing additional medical history, documenting clinical information, independently interpreting results, communicating results to the patient and coordinating care.

## 2022-08-24 ENCOUNTER — OFFICE VISIT (OUTPATIENT)
Dept: OBGYN | Facility: CLINIC | Age: 42
End: 2022-08-24
Payer: COMMERCIAL

## 2022-08-24 VITALS
SYSTOLIC BLOOD PRESSURE: 107 MMHG | BODY MASS INDEX: 24.24 KG/M2 | HEART RATE: 70 BPM | HEIGHT: 61 IN | DIASTOLIC BLOOD PRESSURE: 69 MMHG | OXYGEN SATURATION: 97 % | WEIGHT: 128.4 LBS

## 2022-08-24 DIAGNOSIS — Z13.1 SCREENING FOR DIABETES MELLITUS: ICD-10-CM

## 2022-08-24 DIAGNOSIS — Z13.220 SCREENING CHOLESTEROL LEVEL: ICD-10-CM

## 2022-08-24 DIAGNOSIS — E03.9 HYPOTHYROIDISM, UNSPECIFIED TYPE: ICD-10-CM

## 2022-08-24 DIAGNOSIS — Z23 NEED FOR TDAP VACCINATION: ICD-10-CM

## 2022-08-24 DIAGNOSIS — Z01.419 WELL WOMAN EXAM WITH ROUTINE GYNECOLOGICAL EXAM: Primary | ICD-10-CM

## 2022-08-24 PROCEDURE — 90715 TDAP VACCINE 7 YRS/> IM: CPT | Performed by: OBSTETRICS & GYNECOLOGY

## 2022-08-24 PROCEDURE — 99396 PREV VISIT EST AGE 40-64: CPT | Mod: 25 | Performed by: OBSTETRICS & GYNECOLOGY

## 2022-08-24 PROCEDURE — 90471 IMMUNIZATION ADMIN: CPT | Performed by: OBSTETRICS & GYNECOLOGY

## 2022-08-24 NOTE — PROGRESS NOTES
Lorraine Huston is a 42 year old female , who presents for annual exam.   No unusual bleeding, no incontinence, or unusual discharge.   She is currently using OCPs for menstrual regulation.  She does not have any apparent contraindications to use.  She has not had any apparent complications with it's use.  She would like to discontinue the pills.  She would like to try going off the pills and see how the cycles are.  However, she likes the idea of only one day of bleeding (which she has had with the OCPs).    Last cholesterol: Recent Labs   Lab Test 21  0946 20  0903   CHOL 231* 198   HDL 78 84   * 100*   TRIG 120 70     Past Medical History:   Diagnosis Date     Infertility     clomid pregnancy       Past Surgical History:   Procedure Laterality Date     C/SECTION, LOW TRANSVERSE  09    , Low Transverse       OB History    Para Term  AB Living   2 2 0 2 0 2   SAB IAB Ectopic Multiple Live Births   0 0 0 0 2      # Outcome Date GA Lbr Connor/2nd Weight Sex Delivery Anes PTL Lv   2  09 36w5d  2.325 kg (5 lb 2 oz) M CS-Unspec   SY      Birth Comments: Twin B      Name: Kylah Huston      Apgar1: 8  Apgar5: 8   1  09 36w5d  2.045 kg (4 lb 8.1 oz) F CS-Unspec   SY      Birth Comments: Twin A      Name: Alysia Huston      Apgar1: 8  Apgar5: 9       Gyn History:  Gynecological History         Patient's last menstrual period was 2022 (exact date).     No STD/No PID/No IUD      history of abnormal pap smear:  No  Last pap:   Lab Results   Component Value Date    PAP NIL 01/10/2019           Current Outpatient Medications   Medication Sig Dispense Refill     CALCIUM PO        Multiple Vitamins-Minerals (MULTIVITAMIN & MINERAL PO)        norgestrel-ethinyl estradiol (LO/OVRAL) 0.3-30 MG-MCG tablet Take 1 active pill every 6 hours.  Once completed, discard inactive pills.  Wait for bleeding to start, then start new pack, 1 tablet  daily. 84 tablet 1       Allergies   Allergen Reactions     Hydrocodone        Social History     Socioeconomic History     Marital status:      Spouse name: Not on file     Number of children: Not on file     Years of education: Not on file     Highest education level: Not on file   Occupational History     Occupation: Paraprofessional   Tobacco Use     Smoking status: Never Smoker     Smokeless tobacco: Never Used   Vaping Use     Vaping Use: Never used   Substance and Sexual Activity     Alcohol use: No     Drug use: No     Sexual activity: Yes     Partners: Male     Birth control/protection: Male Surgical     Comment: vasectomy   Other Topics Concern      Service No     Blood Transfusions No     Caffeine Concern No     Occupational Exposure No     Hobby Hazards Yes     Comment: down hill ski     Sleep Concern No     Stress Concern No     Weight Concern No     Special Diet Yes     Comment: eat healthy (not a milk drinker drink calcium OJ     Back Care Yes     Comment: Left upper and Left shoulder     Exercise Yes     Bike Helmet Not Asked     Comment: N/A     Seat Belt Yes     Self-Exams Yes     Parent/sibling w/ CABG, MI or angioplasty before 65F 55M? No   Social History Narrative    November 8, 2018    ENVIRONMENTAL HISTORY: The family [place] in an 8 year [place]. The home is heated with a forced air. They do have central air conditioning. The patient's bedroom is furnished with carpeting in bedroom. No pets inside the house. There is no history of cockroach or mice infestation. There are no smokers in the house.  The house does not have a damp basement.      Social Determinants of Health     Financial Resource Strain: Not on file   Food Insecurity: Not on file   Transportation Needs: Not on file   Physical Activity: Not on file   Stress: Not on file   Social Connections: Not on file   Intimate Partner Violence: Not on file   Housing Stability: Not on file       Family  "History   Problem Relation Age of Onset     Diabetes Maternal Grandmother      Heart Disease Father      Hypertension Father      Diabetes Father      Cancer Maternal Grandfather         lung     Deep Vein Thrombosis Paternal Grandmother      Hypertension Paternal Grandmother          ROS:  All negative except as above.      EXAM:  /69 (BP Location: Right arm, Cuff Size: Adult Regular)   Pulse 70   Ht 1.553 m (5' 1.15\")   Wt 58.2 kg (128 lb 6.4 oz)   LMP 07/31/2022 (Exact Date)   SpO2 97%   Breastfeeding No   BMI 24.14 kg/m    General:  WNWD female, NAD  Alert  Oriented x 3  Gait:  Normal  Skin:  Normal skin turgor  Neurologic:  CN grossly intact, good sensation.    HEENT:  NC/AT, EOMI  Neck:  No masses palpated, symmetrical, carotids +2/4, no bruits heard  Heart:  RRR  Lungs:  Clear   Breasts:  Symmetrical, no dimpling noted, no masses palpated, no discharge expressed  Abdomen:  Non-tender, non-distended.  Vulva: No external lesions, normal hair distribution, no adenopathy  BUS:  Normal, no masses noted  Urethra:  No hypermobility noted  Urethral meatus:  No masses noted  Vagina: Moist, pink, no abnormal discharge, well rugated, no lesions  Cervix: Smooth, pink, no visible lesions  Uterus: Normal size, anteverted, non-tender, mobile  Ovaries: No mass, non-tender, mobile  Perianal:  No masses noted.   Extremities:  No clubbing, cyanosis, or edema      ASSESSMENT/PLAN   Annual examination   Fasting labs are ordered.  The TSH is also going to be drawn.  She has a lab appointment tomorrow.    Mammogram is tomorrow.    Low fat diet, weight bearing exercises and self breast exams on a monthly basis have been recommended.  I have discussed with patient the risks, benefits, medications, treatment options and modalities.   I have instructed the patient to call or schedule a follow-up appointment if any problems.    "

## 2022-08-24 NOTE — NURSING NOTE
Prior to immunization administration, verified patients identity using patient s name and date of birth. Please see Immunization Activity for additional information.     Screening Questionnaire for Adult Immunization    Are you sick today?   No   Do you have allergies to medications, food, a vaccine component or latex?   Yes   Have you ever had a serious reaction after receiving a vaccination?   No   Do you have a long-term health problem with heart, lung, kidney, or metabolic disease (e.g., diabetes), asthma, a blood disorder, no spleen, complement component deficiency, a cochlear implant, or a spinal fluid leak?  Are you on long-term aspirin therapy?   No   Do you have cancer, leukemia, HIV/AIDS, or any other immune system problem?   No   Do you have a parent, brother, or sister with an immune system problem?   No   In the past 3 months, have you taken medications that affect  your immune system, such as prednisone, other steroids, or anticancer drugs; drugs for the treatment of rheumatoid arthritis, Crohn s disease, or psoriasis; or have you had radiation treatments?   No   Have you had a seizure, or a brain or other nervous system problem?   No   During the past year, have you received a transfusion of blood or blood    products, or been given immune (gamma) globulin or antiviral drug?   No   For women: Are you pregnant or is there a chance you could become       pregnant during the next month?   No   Have you received any vaccinations in the past 4 weeks?   No     Immunization questionnaire was positive for at least one answer.  Notified [facility].        Per orders of Dr. Parekh, injection of Tdap given by Mandy Rosa CMA. Patient instructed to remain in clinic for 15 minutes afterwards, and to report any adverse reaction to me immediately.       Screening performed by Mandy Rosa CMA on 8/24/2022 at 1:21 PM.

## 2022-08-25 ENCOUNTER — LAB (OUTPATIENT)
Dept: LAB | Facility: CLINIC | Age: 42
End: 2022-08-25

## 2022-08-25 ENCOUNTER — ANCILLARY PROCEDURE (OUTPATIENT)
Dept: MAMMOGRAPHY | Facility: CLINIC | Age: 42
End: 2022-08-25
Payer: COMMERCIAL

## 2022-08-25 DIAGNOSIS — E03.9 HYPOTHYROIDISM, UNSPECIFIED TYPE: ICD-10-CM

## 2022-08-25 DIAGNOSIS — Z13.220 SCREENING CHOLESTEROL LEVEL: ICD-10-CM

## 2022-08-25 DIAGNOSIS — Z12.31 VISIT FOR SCREENING MAMMOGRAM: ICD-10-CM

## 2022-08-25 DIAGNOSIS — Z01.419 WELL WOMAN EXAM WITH ROUTINE GYNECOLOGICAL EXAM: ICD-10-CM

## 2022-08-25 DIAGNOSIS — Z13.1 SCREENING FOR DIABETES MELLITUS: ICD-10-CM

## 2022-08-25 LAB
CHOLEST SERPL-MCNC: 235 MG/DL
FASTING STATUS PATIENT QL REPORTED: YES
FASTING STATUS PATIENT QL REPORTED: YES
GLUCOSE BLD-MCNC: 86 MG/DL (ref 70–99)
HDLC SERPL-MCNC: 58 MG/DL
LDLC SERPL CALC-MCNC: 149 MG/DL
NONHDLC SERPL-MCNC: 177 MG/DL
TRIGL SERPL-MCNC: 138 MG/DL
TSH SERPL DL<=0.005 MIU/L-ACNC: 2.94 MU/L (ref 0.4–4)

## 2022-08-25 PROCEDURE — 36415 COLL VENOUS BLD VENIPUNCTURE: CPT

## 2022-08-25 PROCEDURE — 80061 LIPID PANEL: CPT

## 2022-08-25 PROCEDURE — 84443 ASSAY THYROID STIM HORMONE: CPT

## 2022-08-25 PROCEDURE — 77067 SCR MAMMO BI INCL CAD: CPT | Mod: TC | Performed by: RADIOLOGY

## 2022-08-25 PROCEDURE — 77063 BREAST TOMOSYNTHESIS BI: CPT | Mod: TC | Performed by: RADIOLOGY

## 2022-08-25 PROCEDURE — 82947 ASSAY GLUCOSE BLOOD QUANT: CPT

## 2022-08-29 PROBLEM — E78.5 HYPERLIPIDEMIA LDL GOAL <130: Status: ACTIVE | Noted: 2022-08-29

## 2022-10-11 ENCOUNTER — ANCILLARY PROCEDURE (OUTPATIENT)
Dept: GENERAL RADIOLOGY | Facility: CLINIC | Age: 42
End: 2022-10-11
Attending: NURSE PRACTITIONER
Payer: COMMERCIAL

## 2022-10-11 ENCOUNTER — HOSPITAL ENCOUNTER (EMERGENCY)
Facility: CLINIC | Age: 42
Discharge: HOME | End: 2022-10-11
Attending: EMERGENCY MEDICINE | Admitting: EMERGENCY MEDICINE
Payer: COMMERCIAL

## 2022-10-11 ENCOUNTER — OFFICE VISIT (OUTPATIENT)
Dept: URGENT CARE | Facility: URGENT CARE | Age: 42
End: 2022-10-11
Payer: COMMERCIAL

## 2022-10-11 VITALS
RESPIRATION RATE: 18 BRPM | TEMPERATURE: 98.2 F | HEIGHT: 62 IN | OXYGEN SATURATION: 100 % | BODY MASS INDEX: 23 KG/M2 | WEIGHT: 125 LBS | HEART RATE: 87 BPM

## 2022-10-11 VITALS
SYSTOLIC BLOOD PRESSURE: 105 MMHG | DIASTOLIC BLOOD PRESSURE: 41 MMHG | HEART RATE: 125 BPM | RESPIRATION RATE: 24 BRPM | BODY MASS INDEX: 23.95 KG/M2 | TEMPERATURE: 102.2 F | WEIGHT: 127.4 LBS | OXYGEN SATURATION: 96 %

## 2022-10-11 DIAGNOSIS — R06.02 SHORTNESS OF BREATH: Primary | ICD-10-CM

## 2022-10-11 DIAGNOSIS — R05.1 ACUTE COUGH: ICD-10-CM

## 2022-10-11 DIAGNOSIS — R50.9 FEVER, UNSPECIFIED FEVER CAUSE: ICD-10-CM

## 2022-10-11 DIAGNOSIS — J06.9 VIRAL URI WITH COUGH: ICD-10-CM

## 2022-10-11 DIAGNOSIS — R00.0 TACHYCARDIA: ICD-10-CM

## 2022-10-11 DIAGNOSIS — R06.02 SHORTNESS OF BREATH: ICD-10-CM

## 2022-10-11 LAB
DEPRECATED S PYO AG THROAT QL EIA: NEGATIVE
FLUAV AG SPEC QL IA: NEGATIVE
FLUAV RNA SPEC QL NAA+PROBE: NEGATIVE
FLUBV AG SPEC QL IA: NEGATIVE
FLUBV RNA RESP QL NAA+PROBE: NEGATIVE
SARS-COV-2 RNA RESP QL NAA+PROBE: NEGATIVE

## 2022-10-11 PROCEDURE — 71046 X-RAY EXAM CHEST 2 VIEWS: CPT | Mod: TC | Performed by: RADIOLOGY

## 2022-10-11 PROCEDURE — C9803 HOPD COVID-19 SPEC COLLECT: HCPCS

## 2022-10-11 PROCEDURE — U0003 INFECTIOUS AGENT DETECTION BY NUCLEIC ACID (DNA OR RNA); SEVERE ACUTE RESPIRATORY SYNDROME CORONAVIRUS 2 (SARS-COV-2) (CORONAVIRUS DISEASE [COVID-19]), AMPLIFIED PROBE TECHNIQUE, MAKING USE OF HIGH THROUGHPUT TECHNOLOGIES AS DESCRIBED BY CMS-2020-01-R: HCPCS | Performed by: NURSE PRACTITIONER

## 2022-10-11 PROCEDURE — 87636 SARSCOV2 & INF A&B AMP PRB: CPT | Performed by: FAMILY MEDICINE

## 2022-10-11 PROCEDURE — 99284 EMERGENCY DEPT VISIT MOD MDM: CPT | Mod: CS | Performed by: EMERGENCY MEDICINE

## 2022-10-11 PROCEDURE — 87636 SARSCOV2 & INF A&B AMP PRB: CPT | Performed by: EMERGENCY MEDICINE

## 2022-10-11 PROCEDURE — 87651 STREP A DNA AMP PROBE: CPT | Performed by: NURSE PRACTITIONER

## 2022-10-11 PROCEDURE — 99283 EMERGENCY DEPT VISIT LOW MDM: CPT | Mod: CS

## 2022-10-11 PROCEDURE — U0005 INFEC AGEN DETEC AMPLI PROBE: HCPCS | Performed by: NURSE PRACTITIONER

## 2022-10-11 PROCEDURE — 99214 OFFICE O/P EST MOD 30 MIN: CPT | Mod: CS | Performed by: NURSE PRACTITIONER

## 2022-10-11 PROCEDURE — 87804 INFLUENZA ASSAY W/OPTIC: CPT | Performed by: NURSE PRACTITIONER

## 2022-10-11 RX ORDER — ALBUTEROL SULFATE 90 UG/1
2 AEROSOL, METERED RESPIRATORY (INHALATION) ONCE
Status: COMPLETED | OUTPATIENT
Start: 2022-10-11 | End: 2022-10-11

## 2022-10-11 RX ORDER — PREDNISONE 20 MG/1
40 TABLET ORAL DAILY
Qty: 10 TABLET | Refills: 0 | Status: SHIPPED | OUTPATIENT
Start: 2022-10-11 | End: 2023-07-28

## 2022-10-11 RX ORDER — ACETAMINOPHEN 325 MG/1
650 TABLET ORAL ONCE
Status: COMPLETED | OUTPATIENT
Start: 2022-10-11 | End: 2022-10-11

## 2022-10-11 RX ADMIN — ACETAMINOPHEN 650 MG: 325 TABLET ORAL at 18:59

## 2022-10-11 RX ADMIN — ALBUTEROL SULFATE 2 PUFF: 90 INHALANT RESPIRATORY (INHALATION) at 19:10

## 2022-10-11 ASSESSMENT — ACTIVITIES OF DAILY LIVING (ADL): ADLS_ACUITY_SCORE: 33

## 2022-10-11 NOTE — PROGRESS NOTES
Assessment & Plan     Shortness of breath    - XR Chest 2 Views  - albuterol (PROVENTIL HFA/VENTOLIN HFA) inhaler    Tachycardia    Fever, unspecified fever cause    - Influenza A/B antigen  - Symptomatic; Yes; 10/9/2022 COVID-19 Virus (Coronavirus) by PCR Nose  - XR Chest 2 Views  - Streptococcus A Rapid Screen w/Reflex to PCR - Clinic Collect  - acetaminophen (TYLENOL) tablet 650 mg  - Group A Streptococcus PCR Throat Swab    Acute cough    - XR Chest 2 Views       Reviewed negative rapid strep results during visit, PCR testing in process and COVID test in process, will notify if positive. Influenza testing negative. She is given 650 mg tylenol during visit with some relief. She is given 2 puffs albuterol inhaler which helps her shortness of breath, but it is still present. Reviewed chest xray images and results during visit showing no obvious pneumonia. Discussed cannot rule out DVT/PE in urgent care and patient would like to go to ED to have this ruled out. She declines ambulance and is discharged in stable condition.       Nevaeh Campos NP  [facility]RG[facility]     Lorraine is a 42 year old female who presents to clinic today for the following health issues:  Chief Complaint   Patient presents with     Sick     Sx started Sunday with sore throat. Felt fine sunday and better Monday. Then Cough started, can feel it going into her chest. Feels like throat hurts from cough. Fever today. Dry cough. Headache today.     Patient presents for evaluation of cough. Associated symptoms: shortness of breath, sore throat, headache, nasal congestion, fever started today and is 103.9F currently. Symptoms started 2 days ago with sore throat and improved yesterday before worsening today. Denies chest pain, wheezing. She gets occasional calf pains, none currently. Nothing tried for symptoms. She has been able to drink fluids and swallow without difficulty.     She has a history of asthma. She hasn't  needed her albuterol inhaler for years and would like a ventolin inhaler as she doesn't have one. She had COVID about 4 months ago and tested negative for COVID today. No history of DVT or PE.  She was on birth control pill for 6 months, stopped 2 months ago. She is concerned about possible blood clot.     Problem list, Medication list, Allergies, and Medical history reviewed in EPIC.    ROS:  Review of systems negative except for noted above        Objective    /41   Pulse (!) 125   Temp (!) 102.2  F (39  C) (Tympanic)   Resp 24   Wt 57.8 kg (127 lb 6.4 oz)   LMP 09/20/2022 (Approximate)   SpO2 96%   PF 98 L/min   BMI 23.95 kg/m    Physical Exam  Constitutional:       General: She is not in acute distress.     Appearance: She is not toxic-appearing or diaphoretic.   HENT:      Head: Normocephalic and atraumatic.      Right Ear: Tympanic membrane, ear canal and external ear normal.      Left Ear: Tympanic membrane, ear canal and external ear normal.      Nose:      Right Sinus: No maxillary sinus tenderness or frontal sinus tenderness.      Left Sinus: No maxillary sinus tenderness or frontal sinus tenderness.      Comments: Mild nasal congestion     Mouth/Throat:      Mouth: Mucous membranes are moist.      Pharynx: Oropharynx is clear. Posterior oropharyngeal erythema present. No oropharyngeal exudate.      Tonsils: No tonsillar abscesses. 0 on the right. 0 on the left.      Comments: Moderate oropharyngeal erythema  Eyes:      Conjunctiva/sclera: Conjunctivae normal.   Cardiovascular:      Rate and Rhythm: Regular rhythm. Tachycardia present.      Heart sounds: Normal heart sounds.   Pulmonary:      Effort: Pulmonary effort is normal. No respiratory distress.      Breath sounds: Normal breath sounds. No wheezing, rhonchi or rales.   Lymphadenopathy:      Cervical: No cervical adenopathy.   Skin:     General: Skin is warm and dry.   Neurological:      Mental Status: She is alert.          X-ray chest  was performed and reviewed independently by myself showing no obvious pneumonia  Labs and Radiologist impression:       Results for orders placed or performed in visit on 10/11/22   XR Chest 2 Views     Status: None    Narrative    EXAM: XR CHEST 2 VIEWS  LOCATION: [facility] [place]  DATE/TIME: 10/11/2022 7:07 PM    INDICATION: Cough, shortness of breath, and fever.  COMPARISON: None available.      Impression    IMPRESSION: No focal airspace consolidation. No pleural effusion or pneumothorax.    Cardiomediastinal silhouette is normal.   Results for orders placed or performed in visit on 10/11/22   Influenza A/B antigen     Status: Normal    Specimen: Nose; Swab   Result Value Ref Range    Influenza A antigen Negative Negative    Influenza B antigen Negative Negative    Narrative    Test results must be correlated with clinical data. If necessary, results should be confirmed by a molecular assay or viral culture.   Streptococcus A Rapid Screen w/Reflex to PCR - Clinic Collect     Status: Normal    Specimen: Throat; Swab   Result Value Ref Range    Group A Strep antigen Negative Negative

## 2022-10-12 LAB
GROUP A STREP BY PCR: NOT DETECTED
SARS-COV-2 RNA RESP QL NAA+PROBE: NEGATIVE

## 2022-10-12 NOTE — DISCHARGE INSTRUCTIONS
Take the steroids as prescribed.  Return to the emergency department for worsening symptoms, difficulty breathing, or other concerns.  Otherwise follow-up in clinic for recheck if not feeling better in the next 3 to 5 days.

## 2022-10-12 NOTE — ED TRIAGE NOTES
Told to come to ED from urgent care for cough and sore throat with SOA     Triage Assessment     Row Name 10/11/22 2674       Triage Assessment (Adult)    Airway WDL WDL       Respiratory WDL    Respiratory WDL cough    Cough Frequency infrequent       Skin Circulation/Temperature WDL    Skin Circulation/Temperature WDL WDL       Cardiac WDL    Cardiac WDL WDL       Peripheral/Neurovascular WDL    Peripheral Neurovascular WDL WDL       Cognitive/Neuro/Behavioral WDL    Cognitive/Neuro/Behavioral WDL WDL

## 2022-10-12 NOTE — ED PROVIDER NOTES
History     Chief Complaint   Patient presents with     Cough     HPI  Lorraine Huston is a 42 year old female who presents for cough and shortness of breath.  The patient reports that she has been feeling unwell over the past several days.  Initially started with sore throat and congestion 2 days ago, she was feeling mostly better yesterday but then got worse today.  Fever, body aches, cough.  She does have some pain in her chest when she is taking a deep breath.  No hemoptysis.  Fever up to 103  F.  She denies nausea, vomiting, abdominal pain, diarrhea, bloody stools.  No lower extremity pain or swelling.  No recent travel or surgery, she did drive to [place] in August, more than 2 months ago.  She also stopped taking her birth control about 2 months ago and has not been taking it now.  She does not have personal history of blood clots.    She says she feels much better after receiving acetaminophen and albuterol in urgent care.  She reports a history of asthma when she was younger but has not had any wheezing for several years, usually illness was the trigger for asthma.  She says that she has been extremely careful about avoiding getting sick over the past 2.5 years during the COVID-19 pandemic.    Allergies:  Allergies   Allergen Reactions     Hydrocodone        Problem List:    Patient Active Problem List    Diagnosis Date Noted     Hyperlipidemia LDL goal <130 2022     Priority: Medium        Past Medical History:    Past Medical History:   Diagnosis Date     Family history of diabetes mellitus      Infertility        Past Surgical History:    Past Surgical History:   Procedure Laterality Date     C/SECTION, LOW TRANSVERSE  09    , Low Transverse       Family History:    Family History   Problem Relation Age of Onset     Diabetes Maternal Grandmother      Heart Disease Father      Hypertension Father      Diabetes Father      Cancer Maternal Grandfather         lung     Deep Vein  "Thrombosis Paternal Grandmother      Hypertension Paternal Grandmother        Social History:  Marital Status:   [2]  Social History     Tobacco Use     Smoking status: Never     Smokeless tobacco: Never   Vaping Use     Vaping Use: Never used   Substance Use Topics     Alcohol use: No     Drug use: No        Medications:    predniSONE (DELTASONE) 20 MG tablet  CALCIUM PO  Multiple Vitamins-Minerals (MULTIVITAMIN & MINERAL PO)          Review of Systems  Pertinent positives and negatives listed in the HPI, all other systems reviewed and are negative.    Physical Exam   Pulse: 87  Temp: 98.2  F (36.8  C)  Resp: 18  Height: 157.5 cm (5' 2\")  Weight: 56.7 kg (125 lb)  SpO2: 100 %      Physical Exam  Vitals and nursing note reviewed.   Constitutional:       General: She is in acute distress.      Appearance: She is well-developed and well-nourished. She is not diaphoretic.   HENT:      Head: Normocephalic and atraumatic.      Right Ear: External ear normal.      Left Ear: External ear normal.      Nose: Nose normal.   Eyes:      General: No scleral icterus.     Conjunctiva/sclera: Conjunctivae normal.   Cardiovascular:      Rate and Rhythm: Normal rate and regular rhythm.      Pulses:           Radial pulses are 2+ on the right side and 2+ on the left side.        Posterior tibial pulses are 2+ on the right side and 2+ on the left side.   Pulmonary:      Effort: Pulmonary effort is normal. No respiratory distress.      Breath sounds: No stridor.   Abdominal:      General: There is no distension.      Palpations: Abdomen is soft.   Musculoskeletal:      Cervical back: Normal range of motion.      Right lower leg: No edema.      Left lower leg: No edema.   Skin:     General: Skin is warm and dry.   Neurological:      Mental Status: She is alert and oriented to person, place, and time.   Psychiatric:         Mood and Affect: Mood and affect normal.         Behavior: Behavior normal.         ED Course               "   Procedures              Critical Care time:  none               Results for orders placed or performed during the hospital encounter of 10/11/22 (from the past 24 hour(s))   Symptomatic; Yes; 10/9/2022 Influenza A/B & SARS-CoV2 (COVID-19) Virus PCR Multiplex Nasopharyngeal    Specimen: Nasopharyngeal; Swab   Result Value Ref Range    Influenza A PCR Negative Negative    Influenza B PCR Negative Negative    SARS CoV2 PCR Negative Negative    Narrative    Testing was performed using the barrington SARS-CoV-2 & Influenza A/B Assay on the barrington Amina System. This test should be ordered for the detection of SARS-CoV-2 and influenza viruses in individuals who meet clinical and/or epidemiological criteria. Test performance is unknown in asymptomatic patients. This test is for in vitro diagnostic use under the FDA EUA for laboratories certified under CLIA to perform moderate and/or high complexity testing. This test has not been FDA cleared or approved. A negative result does not rule out the presence of PCR inhibitors in the specimen or target RNA in concentration below the limit of detection for the assay. If only one viral target is positive but coinfection with multiple targets is suspected, the sample should be re-tested with another FDA cleared, approved or authorized test, if coinfection would change clinical management. [facility] are certified under the Clinical Laboratory Improvement Amendments of 1988 (CLIA-88) as qualified to perform moderate and/or high complexity laboratory testing.       Medications - No data to display    Assessments & Plan (with Medical Decision Making)   42-year-old female with a reported history of asthma who presents from urgent care for cough and shortness of breath, feeling much better now.  Temperature is 36.8  C, heart rate is 87, SPO2 is 100% on room air.  Nasal swab is negative for influenza for COVID-19.  She had a chest x-ray earlier today in urgent care, I  reviewed those images, no signs of pneumonia.  Lungs are clear to auscultation throughout now.    Patient reports no personal history of DVT or PE, no recent trauma or surgery, hemoptysis, hormone use, or leg swelling. Therefore there is no further indication for work up for pulmonary embolism.     At this point she is safe to discharge home with prescription for prednisone and instructions to return if she has worsening of her symptoms or other concerns, otherwise get rechecked if not feeling better over the next several days.  The patient is in agreement with this plan.    I have reviewed the nursing notes.    I have reviewed the findings, diagnosis, plan and need for follow up with the patient.       New Prescriptions    PREDNISONE (DELTASONE) 20 MG TABLET    Take 2 tablets (40 mg) by mouth daily       Final diagnoses:   Viral URI with cough       10/11/2022   [facility] EMERGENCY DEPT     Kailash Rivera MD  10/11/22 8626

## 2022-11-10 ENCOUNTER — OFFICE VISIT (OUTPATIENT)
Dept: FAMILY MEDICINE | Facility: CLINIC | Age: 42
End: 2022-11-10
Payer: COMMERCIAL

## 2022-11-10 ENCOUNTER — MYC MEDICAL ADVICE (OUTPATIENT)
Dept: FAMILY MEDICINE | Facility: CLINIC | Age: 42
End: 2022-11-10

## 2022-11-10 VITALS
RESPIRATION RATE: 16 BRPM | DIASTOLIC BLOOD PRESSURE: 49 MMHG | TEMPERATURE: 100.2 F | OXYGEN SATURATION: 96 % | HEIGHT: 62 IN | HEART RATE: 115 BPM | SYSTOLIC BLOOD PRESSURE: 89 MMHG | WEIGHT: 125.25 LBS | BODY MASS INDEX: 23.05 KG/M2

## 2022-11-10 DIAGNOSIS — J06.9 UPPER RESPIRATORY TRACT INFECTION, UNSPECIFIED TYPE: Primary | ICD-10-CM

## 2022-11-10 DIAGNOSIS — J10.1 INFLUENZA A: Primary | ICD-10-CM

## 2022-11-10 DIAGNOSIS — J45.20 MILD INTERMITTENT ASTHMA WITHOUT COMPLICATION: ICD-10-CM

## 2022-11-10 LAB
FLUAV AG SPEC QL IA: POSITIVE
FLUBV AG SPEC QL IA: NEGATIVE

## 2022-11-10 PROCEDURE — U0005 INFEC AGEN DETEC AMPLI PROBE: HCPCS | Performed by: FAMILY MEDICINE

## 2022-11-10 PROCEDURE — 87804 INFLUENZA ASSAY W/OPTIC: CPT | Performed by: FAMILY MEDICINE

## 2022-11-10 PROCEDURE — U0003 INFECTIOUS AGENT DETECTION BY NUCLEIC ACID (DNA OR RNA); SEVERE ACUTE RESPIRATORY SYNDROME CORONAVIRUS 2 (SARS-COV-2) (CORONAVIRUS DISEASE [COVID-19]), AMPLIFIED PROBE TECHNIQUE, MAKING USE OF HIGH THROUGHPUT TECHNOLOGIES AS DESCRIBED BY CMS-2020-01-R: HCPCS | Performed by: FAMILY MEDICINE

## 2022-11-10 PROCEDURE — 99213 OFFICE O/P EST LOW 20 MIN: CPT | Mod: CS | Performed by: FAMILY MEDICINE

## 2022-11-10 RX ORDER — ALBUTEROL SULFATE 90 UG/1
2 AEROSOL, METERED RESPIRATORY (INHALATION) EVERY 6 HOURS PRN
Qty: 18 G | Refills: 0 | Status: SHIPPED | OUTPATIENT
Start: 2022-11-10 | End: 2023-12-12

## 2022-11-10 NOTE — RESULT ENCOUNTER NOTE
Patient updated by [employer] message with lab results.       Chris Peters,  Your flu test has returned positive. You are a candidate for Tamiflu treatment if desired based on your asthma history. Please send me a [employer] message if you would like to take the treatment. We would want to start this within 48 hours of symptom onset.  Yaa Castro, DO

## 2022-11-10 NOTE — PROGRESS NOTES
Assessment & Plan     1. Upper respiratory tract infection, unspecified type  - albuterol (PROAIR HFA/PROVENTIL HFA/VENTOLIN HFA) 108 (90 Base) MCG/ACT inhaler; Inhale 2 puffs into the lungs every 6 hours as needed for shortness of breath / dyspnea or wheezing  Dispense: 18 g; Refill: 0  - Symptomatic; Yes; 11/9/2022 COVID-19 Virus (Coronavirus) by PCR Nasopharyngeal; Future  - Influenza A & B Antigen - Clinic Collect     There is concerned about new onset respiratory symptoms in setting of recent fairly significant asthma exacerbation with illness.  She has cough, headache, aches, sore throat, fatigue, and low-grade temps.  Recommend ruling out COVID and flu.  Continue symptomatic management with Tylenol and ibuprofen.  Will refill albuterol inhaler.    If symptoms worsening, we will need to reassess.  I can double book her in clinic tomorrow if symptoms worsening, or again next week.  Over the weekend, she would need to go to walk-in care for reevaluation.    Return in about 41 weeks (around 8/24/2023) for Physical Exam / sooner as needed .    Yaa Castro, [facility] is [facility] 42 year old presenting for the following health issues:  Cough (With chest pressure)    History of Present Illness       Reason for visit:  Cough and headache  Symptom onset:  1-3 days ago  Symptoms include:  Cough and headache  Symptom intensity:  Moderate  Symptom progression:  Staying the same  Had these symptoms before:  Yes  Has tried/received treatment for these symptoms:  Yes  Previous treatment was successful:  Yes  Prior treatment description:  Prednisone and inhalerShe consumes 1 sweetened beverage(s) daily.She exercises with enough effort to increase her heart rate 30 to 60 minutes per day.  She exercises with enough effort to increase her heart rate 3 or less days per week.   She is taking medications regularly.     Onset of coughing and itchy throat last night, coughing  "worsening throughout the day.   Did have low grade temp today of 100. Something and has a bad headache.     Reports asthma as a child, has been well controlled. Hadn't been sick with covid because was masking.   First illness again last week, treated with albuterol.     Trialed albuterol and tylenol.     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     Review of Systems   A little achy, is fatigued, appetite is poor.   No ear pain, no rhinorrhea.   When breathes in, has a central pressure.   No nausea or vomiting, no diarrhea.   No skin rashes.       Objective    BP (!) 89/49 (BP Location: Left arm, Patient Position: Sitting, Cuff Size: Adult Regular)   Pulse 115   Temp 100.2  F (37.9  C) (Oral)   Resp 16   Ht 1.575 m (5' 2\")   Wt 56.8 kg (125 lb 4 oz)   LMP 10/20/2022 (Approximate)   SpO2 96%   BMI 22.91 kg/m    Body mass index is 22.91 kg/m .  Physical Exam   GENERAL: healthy, alert and no distress  HENT: ear canals and TM's normal, nose and mouth without ulcers or lesions  NECK: no adenopathy, no asymmetry, masses, or scars and thyroid normal to palpation  RESP: lungs clear to auscultation - no rales, rhonchi or wheezes  CV: regular rate and rhythm, normal S1 S2, no S3 or S4, no murmur, click or rub, no peripheral edema and peripheral pulses strong  ABDOMEN: soft, nontender, no hepatosplenomegaly, no masses and bowel sounds normal  MS: no gross musculoskeletal defects noted, no edema                "

## 2022-11-11 LAB — SARS-COV-2 RNA RESP QL NAA+PROBE: NEGATIVE

## 2022-11-11 RX ORDER — OSELTAMIVIR PHOSPHATE 75 MG/1
75 CAPSULE ORAL 2 TIMES DAILY
Qty: 10 CAPSULE | Refills: 0 | Status: SHIPPED | OUTPATIENT
Start: 2022-11-11 | End: 2022-11-16

## 2022-11-11 NOTE — TELEPHONE ENCOUNTER
1. Influenza A  2. Mild intermittent asthma without complication  - oseltamivir (TAMIFLU) 75 MG capsule; Take 1 capsule (75 mg) by mouth 2 times daily for 5 days  Dispense: 10 capsule; Refill: 0     Prescription sent to pharmacy.    Yaa Castro, DO

## 2022-11-11 NOTE — RESULT ENCOUNTER NOTE
Patient updated by [employer] message with lab results.       Covid test negative. I hope you start to feel better soon.  Yaa Castro, DO

## 2022-11-17 DIAGNOSIS — E06.3 HASHIMOTO'S THYROIDITIS: Primary | ICD-10-CM

## 2022-11-21 ENCOUNTER — HEALTH MAINTENANCE LETTER (OUTPATIENT)
Age: 42
End: 2022-11-21

## 2023-03-27 ENCOUNTER — LAB (OUTPATIENT)
Dept: LAB | Facility: CLINIC | Age: 43
End: 2023-03-27
Payer: COMMERCIAL

## 2023-03-27 DIAGNOSIS — E06.3 HASHIMOTO'S THYROIDITIS: ICD-10-CM

## 2023-03-27 LAB
T4 FREE SERPL-MCNC: 1 NG/DL (ref 0.76–1.46)
TSH SERPL DL<=0.005 MIU/L-ACNC: 2.25 MU/L (ref 0.4–4)

## 2023-03-27 PROCEDURE — 84439 ASSAY OF FREE THYROXINE: CPT

## 2023-03-27 PROCEDURE — 36415 COLL VENOUS BLD VENIPUNCTURE: CPT

## 2023-03-27 PROCEDURE — 84443 ASSAY THYROID STIM HORMONE: CPT

## 2023-03-27 PROCEDURE — 86376 MICROSOMAL ANTIBODY EACH: CPT

## 2023-03-28 LAB — THYROPEROXIDASE AB SERPL-ACNC: 637 IU/ML

## 2023-07-27 ASSESSMENT — ASTHMA QUESTIONNAIRES
QUESTION_1 LAST FOUR WEEKS HOW MUCH OF THE TIME DID YOUR ASTHMA KEEP YOU FROM GETTING AS MUCH DONE AT WORK, SCHOOL OR AT HOME: NONE OF THE TIME
ACT_TOTALSCORE: 25
QUESTION_4 LAST FOUR WEEKS HOW OFTEN HAVE YOU USED YOUR RESCUE INHALER OR NEBULIZER MEDICATION (SUCH AS ALBUTEROL): NOT AT ALL
QUESTION_2 LAST FOUR WEEKS HOW OFTEN HAVE YOU HAD SHORTNESS OF BREATH: NOT AT ALL
QUESTION_5 LAST FOUR WEEKS HOW WOULD YOU RATE YOUR ASTHMA CONTROL: COMPLETELY CONTROLLED
ACT_TOTALSCORE: 25
QUESTION_3 LAST FOUR WEEKS HOW OFTEN DID YOUR ASTHMA SYMPTOMS (WHEEZING, COUGHING, SHORTNESS OF BREATH, CHEST TIGHTNESS OR PAIN) WAKE YOU UP AT NIGHT OR EARLIER THAN USUAL IN THE MORNING: NOT AT ALL

## 2023-07-27 ASSESSMENT — ENCOUNTER SYMPTOMS
COUGH: 0
JOINT SWELLING: 0
FREQUENCY: 0
ARTHRALGIAS: 0
HEMATURIA: 0
NAUSEA: 0
PARESTHESIAS: 0
SHORTNESS OF BREATH: 0
WEAKNESS: 0
NERVOUS/ANXIOUS: 0
MYALGIAS: 0
HEADACHES: 0
FEVER: 0
ABDOMINAL PAIN: 0
PALPITATIONS: 0
SORE THROAT: 0
HEMATOCHEZIA: 0
CONSTIPATION: 0
EYE PAIN: 0
DYSURIA: 0
DIZZINESS: 0
DIARRHEA: 0
CHILLS: 0
HEARTBURN: 0
BREAST MASS: 0

## 2023-07-28 ENCOUNTER — OFFICE VISIT (OUTPATIENT)
Dept: FAMILY MEDICINE | Facility: CLINIC | Age: 43
End: 2023-07-28
Payer: COMMERCIAL

## 2023-07-28 VITALS
RESPIRATION RATE: 22 BRPM | TEMPERATURE: 98.1 F | HEART RATE: 65 BPM | BODY MASS INDEX: 22.82 KG/M2 | HEIGHT: 62 IN | WEIGHT: 124 LBS | DIASTOLIC BLOOD PRESSURE: 68 MMHG | SYSTOLIC BLOOD PRESSURE: 102 MMHG | OXYGEN SATURATION: 100 %

## 2023-07-28 DIAGNOSIS — E06.3 HASHIMOTO'S THYROIDITIS: ICD-10-CM

## 2023-07-28 DIAGNOSIS — Z78.9 NON-SMOKER: ICD-10-CM

## 2023-07-28 DIAGNOSIS — Z12.31 ENCOUNTER FOR SCREENING MAMMOGRAM FOR BREAST CANCER: ICD-10-CM

## 2023-07-28 DIAGNOSIS — Z13.29 SCREENING FOR THYROID DISORDER: ICD-10-CM

## 2023-07-28 DIAGNOSIS — Z00.00 ROUTINE GENERAL MEDICAL EXAMINATION AT A HEALTH CARE FACILITY: Primary | ICD-10-CM

## 2023-07-28 DIAGNOSIS — E78.5 HYPERLIPIDEMIA LDL GOAL <130: ICD-10-CM

## 2023-07-28 PROCEDURE — 99396 PREV VISIT EST AGE 40-64: CPT | Performed by: STUDENT IN AN ORGANIZED HEALTH CARE EDUCATION/TRAINING PROGRAM

## 2023-07-28 PROCEDURE — 2894A VOIDCORRECT: CPT | Mod: 25 | Performed by: STUDENT IN AN ORGANIZED HEALTH CARE EDUCATION/TRAINING PROGRAM

## 2023-07-28 RX ORDER — OMEGA-3/DHA/EPA/FISH OIL 60 MG-90MG
500 CAPSULE ORAL DAILY
COMMUNITY

## 2023-07-28 ASSESSMENT — ENCOUNTER SYMPTOMS
DIZZINESS: 0
HEMATOCHEZIA: 0
DIARRHEA: 0
SORE THROAT: 0
DYSURIA: 0
HEADACHES: 0
CONSTIPATION: 0
ARTHRALGIAS: 0
HEARTBURN: 0
HEMATURIA: 0
PALPITATIONS: 0
FEVER: 0
EYE PAIN: 0
SHORTNESS OF BREATH: 0
ABDOMINAL PAIN: 0
JOINT SWELLING: 0
CHILLS: 0
WEAKNESS: 0
PARESTHESIAS: 0
NAUSEA: 0
BREAST MASS: 0
COUGH: 0
NERVOUS/ANXIOUS: 0
MYALGIAS: 0
FREQUENCY: 0

## 2023-07-28 ASSESSMENT — PAIN SCALES - GENERAL: PAINLEVEL: NO PAIN (0)

## 2023-07-28 NOTE — PROGRESS NOTES
SUBJECTIVE:   CC: Lorraine is an 43 year old who presents for preventive health visit.       2023     1:42 PM   Additional Questions   Roomed by Chica CASTILLO LPN   Accompanied by Self       Healthy Habits:     Getting at least 3 servings of Calcium per day:  Yes    Bi-annual eye exam:  Yes    Dental care twice a year:  Yes    Sleep apnea or symptoms of sleep apnea:  None    Diet:  Gluten-free/reduced    Frequency of exercise:  2-3 days/week    Duration of exercise:  45-60 minutes    Taking medications regularly:  Yes    Medication side effects:  Not applicable      She has hyperlipidemia that is managed with lifestyle modification  Patient was diagnosed with hypothyroidism few years ago. She is not on medication. She follows up with endocrinology.She would like her lab checked today.  Today's PHQ-2 Score:       2023     2:56 PM   PHQ-2 (  [employer])   Q1: Little interest or pleasure in doing things 0   Q2: Feeling down, depressed or hopeless 0   PHQ-2 Score 0   Q1: Little interest or pleasure in doing things Not at all   Q2: Feeling down, depressed or hopeless Not at all   PHQ-2 Score 0                   Social History     Tobacco Use    Smoking status: Never    Smokeless tobacco: Never   Substance Use Topics    Alcohol use: No             2023     2:56 PM   Alcohol Use   Prescreen: >3 drinks/day or >7 drinks/week? Not Applicable     Reviewed orders with patient.  Reviewed health maintenance and updated orders accordingly - Yes  Lab work is in process    Breast Cancer Screenin/27/2023     2:58 PM   Breast CA Risk Assessment (FHS-7)   Do you have a family history of breast, colon, or ovarian cancer? No / Unknown       click delete button to remove this line now  Mammogram Screening - Offered annual screening and updated Health Maintenance for mutual plan based on risk factor consideration  Pertinent mammograms are reviewed under the imaging tab.    History of abnormal Pap smear: NO - age 30-65  PAP every 5 years with negative HPV co-testing recommended      Latest Ref Rng & Units 1/10/2019     3:22 PM 1/10/2019     3:15 PM 2013    12:00 AM   PAP / HPV   PAP (Historical)  NIL   NIL    HPV 16 DNA NEG^Negative  Negative     HPV 18 DNA NEG^Negative  Negative     Other HR HPV NEG^Negative  Negative       Reviewed and updated as needed this visit by clinical staff                  Reviewed and updated as needed this visit by Provider                 Past Medical History:   Diagnosis Date    Family history of diabetes mellitus     Infertility     clomid pregnancy      Past Surgical History:   Procedure Laterality Date    C/SECTION, LOW TRANSVERSE  09    , Low Transverse     OB History    Para Term  AB Living   2 2 0 2 0 2   SAB IAB Ectopic Multiple Live Births   0 0 0 0 2      # Outcome Date GA Lbr Connor/2nd Weight Sex Delivery Anes PTL Lv   2  09 36w5d  2.325 kg (5 lb 2 oz) M CS-Unspec   SY      Birth Comments: Twin B      Name: Kylah RAMIREZ [place]      Apgar1: 8  Apgar5: 8   1  09 36w5d  2.045 kg (4 lb 8.1 oz) F CS-Unspec   SY      Birth Comments: Twin A      Name: Alysia SCHILLING [place]      Apgar1: 8  Apgar5: 9       Review of Systems   Constitutional:  Negative for chills and fever.   HENT:  Negative for congestion, ear pain, hearing loss and sore throat.    Eyes:  Negative for pain and visual disturbance.   Respiratory:  Negative for cough and shortness of breath.    Cardiovascular:  Negative for chest pain, palpitations and peripheral edema.   Gastrointestinal:  Negative for abdominal pain, constipation, diarrhea, heartburn, hematochezia and nausea.   Breasts:  Negative for tenderness, breast mass and discharge.   Genitourinary:  Negative for dysuria, frequency, genital sores, hematuria, pelvic pain, urgency, vaginal bleeding and vaginal discharge.   Musculoskeletal:  Negative for arthralgias, joint swelling and myalgias.   Skin:  Negative for rash.    Neurological:  Negative for dizziness, weakness, headaches and paresthesias.   Psychiatric/Behavioral:  Negative for mood changes. The patient is not nervous/anxious.           OBJECTIVE:   There were no vitals taken for this visit.  Physical Exam  GENERAL: healthy, alert and no distress  EYES: Eyes grossly normal to inspection, PERRL and conjunctivae and sclerae normal  HENT: ear canals and TM's normal, nose and mouth without ulcers or lesions  NECK: no adenopathy, no asymmetry, masses, or scars and thyroid normal to palpation  RESP: lungs clear to auscultation - no rales, rhonchi or wheezes  CV: regular rate and rhythm, normal S1 S2, no S3 or S4, no murmur, click or rub, no peripheral edema and peripheral pulses strong  ABDOMEN: soft, nontender, no hepatosplenomegaly, no masses and bowel sounds normal  MS: no gross musculoskeletal defects noted, no edema  SKIN: no suspicious lesions or rashes  NEURO: Normal strength and tone, mentation intact and speech normal  PSYCH: mentation appears normal, affect normal/bright        ASSESSMENT/PLAN:   1. Routine general medical examination at a health care facility    - Comprehensive metabolic panel (BMP + Alb, Alk Phos, ALT, AST, Total. Bili, TP); Future    2. Hyperlipidemia LDL goal <130  Managed with lifestyle modification  - Lipid panel reflex to direct LDL Fasting; Future    3. Hashimoto's thyroiditis  Managed by Endocrinology.     4. Encounter for screening mammogram for breast cancer    - *MA Screening Digital Bilateral; Future      - TSH with free T4 reflex; Future    6. Non-sm  The 10-year ASCVD risk score (Heidi WEST, et al., 2019) is: 0.6%    Values used to calculate the score:      Age: 43 years      Sex: Female      Is Non- : No      Diabetic: No      Tobacco smoker: No      Systolic Blood Pressure: 102 mmHg      Is BP treated: No      HDL Cholesterol: 58 mg/dL      Total Cholesterol: 235 mg/dL      Patient has been advised of split  billing requirements and indicates understanding: Yes      COUNSELING:  Reviewed preventive health counseling, as reflected in patient instructions    The 10-year ASCVD risk score (Heidi WEST, et al., 2019) is: 0.6%    Values used to calculate the score:      Age: 43 years      Sex: Female      Is Non- : No      Diabetic: No      Tobacco smoker: No      Systolic Blood Pressure: 102 mmHg      Is BP treated: No      HDL Cholesterol: 58 mg/dL      Total Cholesterol: 235 mg/dL      She reports that she has never smoked. She has never used smokeless tobacco.          Lauren Gutierrez MD  [facility]

## 2023-07-28 NOTE — LETTER
My Asthma Action Plan    Name: Lorraine Huston   YOB: 1980  Date: 7/28/2023   My doctor: Lauren Gutierrez MD   My clinic: [facility]        My Rescue Medicine:   Albuterol inhaler (Proair/Ventolin/Proventil HFA)  2-4 puffs EVERY 4 HOURS as needed. Use a spacer if recommended by your provider.   My Asthma Severity:   Intermittent / Exercise Induced  Know your asthma triggers: upper respiratory infections             GREEN ZONE   Good Control  I feel good  No cough or wheeze  Can work, sleep and play without asthma symptoms       Take your asthma control medicine every day.     If exercise triggers your asthma, take your rescue medication  15 minutes before exercise or sports, and  During exercise if you have asthma symptoms  Spacer to use with inhaler: If you have a spacer, make sure to use it with your inhaler             YELLOW ZONE Getting Worse  I have ANY of these:  I do not feel good  Cough or wheeze  Chest feels tight  Wake up at night   Keep taking your Green Zone medications  Start taking your rescue medicine:  every 20 minutes for up to 1 hour. Then every 4 hours for 24-48 hours.  If you stay in the Yellow Zone for more than 12-24 hours, contact your doctor.  If you do not return to the Green Zone in 12-24 hours or you get worse, start taking your oral steroid medicine if prescribed by your provider.           RED ZONE Medical Alert - Get Help  I have ANY of these:  I feel awful  Medicine is not helping  Breathing getting harder  Trouble walking or talking  Nose opens wide to breathe       Take your rescue medicine NOW  If your provider has prescribed an oral steroid medicine, start taking it NOW  Call your doctor NOW  If you are still in the Red Zone after 20 minutes and you have not reached your doctor:  Take your rescue medicine again and  Call 911 or go to the emergency room right away    See your regular doctor within 2 weeks of an Emergency Room or Urgent  Care visit for follow-up treatment.          Annual Reminders:  Meet with Asthma Educator,  Flu Shot in the Fall, consider Pneumonia Vaccination for patients with asthma (aged 19 and older).    Pharmacy:    [id number] [place] - [address] AT [address] [facility] [address]  [facility] [address] [facility] [employer] - [place], [place] - [address]  [facility]    Electronically signed by Lauren Gutierrez MD   Date: 07/28/23                    Asthma Triggers  How To Control Things That Make Your Asthma Worse    Triggers are things that make your asthma worse.  Look at the list below to help you find your triggers and   what you can do about them. You can help prevent asthma flare-ups by staying away from your triggers.      Trigger                                                          What you can do   Cigarette Smoke  Tobacco smoke can make asthma worse. Do not allow smoking in your home, car or around you.  Be sure no one smokes at a child s day care or school.  If you smoke, ask your health care provider for ways to help you quit.  Ask family members to quit too.  Ask your health care provider for a referral to Quit Plan to help you quit smoking, or call 9-754-290-PLAN.     Colds, Flu, Bronchitis  These are common triggers of asthma. Wash your hands often.  Don t touch your eyes, nose or mouth.  Get a flu shot every year.     Dust Mites  These are tiny bugs that live in cloth or carpet. They are too small to see. Wash sheets and blankets in hot water every week.   Encase pillows and mattress in dust mite proof covers.  Avoid having carpet if you can. If you have carpet, vacuum weekly.   Use a dust mask and HEPA vacuum.   Pollen and Outdoor Mold  Some people are allergic to trees, grass, or weed pollen, or molds. Try to keep your windows closed.  Limit time out doors when pollen count is high.   Ask you health care provider about taking medicine during allergy season.      Animal Dander  Some people are allergic to skin flakes, urine or saliva from pets with fur or feathers. Keep pets with fur or feathers out of your home.    If you can t keep the pet outdoors, then keep the pet out of your bedroom.  Keep the bedroom door closed.  Keep pets off cloth furniture and away from stuffed toys.     Mice, Rats, and Cockroaches  Some people are allergic to the waste from these pests.   Cover food and garbage.  Clean up spills and food crumbs.  Store grease in the refrigerator.   Keep food out of the bedroom.   Indoor Mold  This can be a trigger if your home has high moisture. Fix leaking faucets, pipes, or other sources of water.   Clean moldy surfaces.  Dehumidify basement if it is damp and smelly.   Smoke, Strong Odors, and Sprays  These can reduce air quality. Stay away from strong odors and sprays, such as perfume, powder, hair spray, paints, smoke incense, paint, cleaning products, candles and new carpet.   Exercise or Sports  Some people with asthma have this trigger. Be active!  Ask your doctor about taking medicine before sports or exercise to prevent symptoms.    Warm up for 5-10 minutes before and after sports or exercise.     Other Triggers of Asthma  Cold air:  Cover your nose and mouth with a scarf.  Sometimes laughing or crying can be a trigger.  Some medicines and food can trigger asthma.

## 2023-08-16 ENCOUNTER — LAB (OUTPATIENT)
Dept: LAB | Facility: CLINIC | Age: 43
End: 2023-08-16
Payer: COMMERCIAL

## 2023-08-16 DIAGNOSIS — E78.5 HYPERLIPIDEMIA LDL GOAL <130: ICD-10-CM

## 2023-08-16 DIAGNOSIS — Z13.29 SCREENING FOR THYROID DISORDER: ICD-10-CM

## 2023-08-16 DIAGNOSIS — Z00.00 ROUTINE GENERAL MEDICAL EXAMINATION AT A HEALTH CARE FACILITY: ICD-10-CM

## 2023-08-16 DIAGNOSIS — Z11.59 NEED FOR HEPATITIS C SCREENING TEST: Primary | ICD-10-CM

## 2023-08-16 LAB
ALBUMIN SERPL BCG-MCNC: 4.6 G/DL (ref 3.5–5.2)
ALP SERPL-CCNC: 68 U/L (ref 35–104)
ALT SERPL W P-5'-P-CCNC: 24 U/L (ref 0–50)
ANION GAP SERPL CALCULATED.3IONS-SCNC: 11 MMOL/L (ref 7–15)
AST SERPL W P-5'-P-CCNC: 33 U/L (ref 0–45)
BILIRUB SERPL-MCNC: 0.5 MG/DL
BUN SERPL-MCNC: 13.8 MG/DL (ref 6–20)
CALCIUM SERPL-MCNC: 9.1 MG/DL (ref 8.6–10)
CHLORIDE SERPL-SCNC: 103 MMOL/L (ref 98–107)
CHOLEST SERPL-MCNC: 236 MG/DL
CREAT SERPL-MCNC: 0.61 MG/DL (ref 0.51–0.95)
DEPRECATED HCO3 PLAS-SCNC: 26 MMOL/L (ref 22–29)
GFR SERPL CREATININE-BSD FRML MDRD: >90 ML/MIN/1.73M2
GLUCOSE SERPL-MCNC: 89 MG/DL (ref 70–99)
HCV AB SERPL QL IA: NONREACTIVE
HDLC SERPL-MCNC: 71 MG/DL
LDLC SERPL CALC-MCNC: 127 MG/DL
NONHDLC SERPL-MCNC: 165 MG/DL
POTASSIUM SERPL-SCNC: 3.9 MMOL/L (ref 3.4–5.3)
PROT SERPL-MCNC: 7 G/DL (ref 6.4–8.3)
SODIUM SERPL-SCNC: 140 MMOL/L (ref 136–145)
TRIGL SERPL-MCNC: 189 MG/DL
TSH SERPL DL<=0.005 MIU/L-ACNC: 2.96 UIU/ML (ref 0.3–4.2)

## 2023-08-16 PROCEDURE — 36415 COLL VENOUS BLD VENIPUNCTURE: CPT

## 2023-08-16 PROCEDURE — 80053 COMPREHEN METABOLIC PANEL: CPT

## 2023-08-16 PROCEDURE — 80061 LIPID PANEL: CPT

## 2023-08-16 PROCEDURE — 84443 ASSAY THYROID STIM HORMONE: CPT

## 2023-08-16 PROCEDURE — 86803 HEPATITIS C AB TEST: CPT

## 2023-10-03 ENCOUNTER — OFFICE VISIT (OUTPATIENT)
Dept: URGENT CARE | Facility: URGENT CARE | Age: 43
End: 2023-10-03
Payer: COMMERCIAL

## 2023-10-03 VITALS
DIASTOLIC BLOOD PRESSURE: 68 MMHG | RESPIRATION RATE: 12 BRPM | BODY MASS INDEX: 23.91 KG/M2 | HEART RATE: 69 BPM | SYSTOLIC BLOOD PRESSURE: 103 MMHG | OXYGEN SATURATION: 100 % | TEMPERATURE: 97.7 F | WEIGHT: 128.6 LBS

## 2023-10-03 DIAGNOSIS — B34.9 VIRAL ILLNESS: Primary | ICD-10-CM

## 2023-10-03 DIAGNOSIS — M94.0 COSTOCHONDRITIS: ICD-10-CM

## 2023-10-03 DIAGNOSIS — H69.91 DYSFUNCTION OF RIGHT EUSTACHIAN TUBE: ICD-10-CM

## 2023-10-03 PROCEDURE — 99213 OFFICE O/P EST LOW 20 MIN: CPT | Performed by: STUDENT IN AN ORGANIZED HEALTH CARE EDUCATION/TRAINING PROGRAM

## 2023-10-03 RX ORDER — BENZONATATE 100 MG/1
100 CAPSULE ORAL 3 TIMES DAILY PRN
Qty: 60 CAPSULE | Refills: 0 | Status: SHIPPED | OUTPATIENT
Start: 2023-10-03 | End: 2023-12-12

## 2023-10-03 NOTE — PROGRESS NOTES
Assessment & Plan     Viral illness  Dysfunction of right eustachian tube  Costochondritis  43-year-old woman with history of mild intermittent asthma who presents with 2 days of right ear pain likely due to right eustachian tube dysfunction in setting of acute viral URI.  Vitals are within normal limits.  On exam, the patient is nontoxic-appearing, well-hydrated and perfused, normal bilateral ear canal and TMs, no respiratory distress, with anterior chest pain reproducible to palpation.  Plan: Symptomatic management with fluids, Tylenol/ibuprofen, Tessalon Perles, Flonase for eustachian tube dysfunction.  Discussed return precautions.  The patient's questions were addressed.  - benzonatate (TESSALON) 100 MG capsule  Dispense: 60 capsule; Refill: 0            Return in about 1 week (around 10/10/2023).    Jocelyn Hill MD  [facility] [place]    Carroll Peters is a 43 year old female who presents to clinic today for the following health issues:  Chief Complaint   Patient presents with    Ear Problem     Right ear pain started last night   Pressure x2 days  Has been sick twice since school started      HPI    URI Adult    Onset of symptoms was 2 day(s) ago.  Current and Associated symptoms: right ear pain, sore throat, voice changes, nasal congestion  Has shortness of breath improved with albuterol, has anterior chest pain with coughing  Denies fever  Treatment measures tried include Tylenol/Ibuprofen, Decongestants, albuterol prn and Fluids.  Predisposing factors include ill contact: Work.    Review of Systems  Constitutional, HEENT, cardiovascular, pulmonary, gi and gu systems are negative, except as otherwise noted.      Objective    /68   Pulse 69   Temp 97.7  F (36.5  C) (Tympanic)   Resp 12   Wt 58.3 kg (128 lb 9.6 oz)   SpO2 100%   BMI 23.91 kg/m    Physical Exam   GENERAL: healthy, alert and no distress, nontoxic  EYES: Eyes grossly normal to inspection, PERRL and  conjunctivae and sclerae normal  HENT: ear canals and TM's normal, nose and mouth without ulcers or lesions  NECK: no adenopathy  RESP: lungs clear to auscultation - no rales, rhonchi or wheezes  CV: regular rate and rhythm, normal S1 S2, no S3 or S4, no murmur, normal cap refill, and peripheral pulses strong  Chest: Anterior chest pain reproducible to palpation  MS: no gross musculoskeletal defects noted, no edema  SKIN: no suspicious lesions or rashes  NEURO: No focal neurologic deficits    No results found for this or any previous visit (from the past 24 hour(s)).

## 2023-10-03 NOTE — PATIENT INSTRUCTIONS
Patient was educated on the natural course of viral illness which typically lasts up to 10 days.  Conservative measures discussed including increased fluids, nasal saline irrigation (neti pot), warm steamy shower, salt water gargles, cough suppressants, expectorants (Mucinex), decongestants (Pseudofed), and analgesics (Tylenol and/or Ibuprofen). See your primary care provider if symptoms worsen or do not improve in 5 days. Seek emergency care if you develop fever over 104 or shortness of breath.     Fluticasone propionate (50 mcg/spray): Intranasal: Initial: 2 sprays in each nostril once daily

## 2023-10-19 ENCOUNTER — ANCILLARY PROCEDURE (OUTPATIENT)
Dept: MAMMOGRAPHY | Facility: CLINIC | Age: 43
End: 2023-10-19
Attending: STUDENT IN AN ORGANIZED HEALTH CARE EDUCATION/TRAINING PROGRAM
Payer: COMMERCIAL

## 2023-10-19 ENCOUNTER — OFFICE VISIT (OUTPATIENT)
Dept: OBGYN | Facility: CLINIC | Age: 43
End: 2023-10-19
Attending: OBSTETRICS & GYNECOLOGY
Payer: COMMERCIAL

## 2023-10-19 VITALS
HEART RATE: 73 BPM | OXYGEN SATURATION: 100 % | DIASTOLIC BLOOD PRESSURE: 65 MMHG | SYSTOLIC BLOOD PRESSURE: 104 MMHG | WEIGHT: 127.1 LBS | HEIGHT: 62 IN | BODY MASS INDEX: 23.39 KG/M2

## 2023-10-19 DIAGNOSIS — Z12.4 PAP SMEAR FOR CERVICAL CANCER SCREENING: Primary | ICD-10-CM

## 2023-10-19 DIAGNOSIS — Z12.31 ENCOUNTER FOR SCREENING MAMMOGRAM FOR BREAST CANCER: ICD-10-CM

## 2023-10-19 DIAGNOSIS — Z01.419 WELL WOMAN EXAM WITH ROUTINE GYNECOLOGICAL EXAM: ICD-10-CM

## 2023-10-19 PROCEDURE — G0145 SCR C/V CYTO,THINLAYER,RESCR: HCPCS | Performed by: OBSTETRICS & GYNECOLOGY

## 2023-10-19 PROCEDURE — 77067 SCR MAMMO BI INCL CAD: CPT | Mod: TC | Performed by: STUDENT IN AN ORGANIZED HEALTH CARE EDUCATION/TRAINING PROGRAM

## 2023-10-19 PROCEDURE — 77063 BREAST TOMOSYNTHESIS BI: CPT | Mod: TC | Performed by: STUDENT IN AN ORGANIZED HEALTH CARE EDUCATION/TRAINING PROGRAM

## 2023-10-19 PROCEDURE — 87624 HPV HI-RISK TYP POOLED RSLT: CPT | Performed by: OBSTETRICS & GYNECOLOGY

## 2023-10-19 PROCEDURE — 99396 PREV VISIT EST AGE 40-64: CPT | Performed by: OBSTETRICS & GYNECOLOGY

## 2023-10-19 RX ORDER — ASCORBIC ACID 250 MG
250 TABLET,CHEWABLE ORAL DAILY
COMMUNITY

## 2023-10-19 NOTE — PROGRESS NOTES
Lorraine Huston is a 43 year old female , who presents for annual exam.   No unusual bleeding, no incontinence, or unusual discharge.   She had one month of absent menses.  She will monitor and if irregularity occurs, she is encouraged to return for evaluation and workup.    Last cholesterol:   Recent Labs   Lab Test 23  0944 22  0734   CHOL 236* 235*   HDL 71 58   * 149*   TRIG 189* 138     Past Medical History:   Diagnosis Date    Family history of diabetes mellitus     Infertility     clomid pregnancy       Past Surgical History:   Procedure Laterality Date    C/SECTION, LOW TRANSVERSE  09    , Low Transverse       OB History    Para Term  AB Living   2 2 0 2 0 2   SAB IAB Ectopic Multiple Live Births   0 0 0 0 2      # Outcome Date GA Lbr Connor/2nd Weight Sex Delivery Anes PTL Lv   2  09 36w5d  2.325 kg (5 lb 2 oz) M CS-Unspec   SY      Birth Comments: Twin B      Name: Kylah RAMIREZ Robel      Apgar1: 8  Apgar5: 8   1  09 36w5d  2.045 kg (4 lb 8.1 oz) F CS-Unspec   SY      Birth Comments: Twin A      Name: Alysia SCHILLING Robel      Apgar1: 8  Apgar5: 9       Gyn History:  Gynecological History            Patient's last menstrual period was 2023 (exact date).     No STD /No PID/No IUD     history of abnormal pap smear:  no  Last pap:   Lab Results   Component Value Date    PAP NIL 01/10/2019           Current Outpatient Medications   Medication Sig Dispense Refill    albuterol (PROAIR HFA/PROVENTIL HFA/VENTOLIN HFA) 108 (90 Base) MCG/ACT inhaler Inhale 2 puffs into the lungs every 6 hours as needed for shortness of breath / dyspnea or wheezing 18 g 0    ascorbic acid (VITAMIN C) 250 MG CHEW chewable tablet Take 250 mg by mouth daily      CALCIUM PO       fish oil-omega-3 fatty acids 500 MG capsule 500 capsules daily      Multiple Vitamins-Minerals (MULTIVITAMIN & MINERAL PO)       benzonatate (TESSALON) 100 MG capsule Take 1  capsule (100 mg) by mouth 3 times daily as needed for cough (Patient not taking: Reported on 10/19/2023) 60 capsule 0       Allergies   Allergen Reactions    Hydrocodone        Social History     Socioeconomic History    Marital status:      Spouse name: Not on file    Number of children: Not on file    Years of education: Not on file    Highest education level: Not on file   Occupational History    Occupation: Paraprofessional   Tobacco Use    Smoking status: Never    Smokeless tobacco: Never   Vaping Use    Vaping Use: Never used   Substance and Sexual Activity    Alcohol use: No    Drug use: No    Sexual activity: Yes     Partners: Male     Birth control/protection: Male Surgical     Comment: vasectomy   Other Topics Concern     Service No    Blood Transfusions No    Caffeine Concern No    Occupational Exposure No    Hobby Hazards Yes     Comment: down hill ski    Sleep Concern No    Stress Concern No    Weight Concern No    Special Diet Yes     Comment: eat healthy (not a milk drinker drink calcium OJ    Back Care Yes     Comment: Left upper and Left shoulder    Exercise Yes    Bike Helmet Not Asked     Comment: N/A    Seat Belt Yes    Self-Exams Yes    Parent/sibling w/ CABG, MI or angioplasty before 65F 55M? No   Social History Narrative    November 8, 2018    ENVIRONMENTAL HISTORY: The [place] The home is heated with a forced air. They do have central air conditioning. The patient's bedroom is furnished with carpeting in bedroom. No pets inside the house. There is no history of cockroach or mice infestation. There are no smokers in the house.  The house does not have a damp basement.      Social Determinants of Health     Financial Resource Strain: Not on file   Food Insecurity: Not on file   Transportation Needs: Not on file   Physical Activity: Not on file   Stress: Not on file   Social Connections: Not on file   Interpersonal Safety: Not on  "file   Housing Stability: Not on file       Family History   Problem Relation Age of Onset    Diabetes Maternal Grandmother     Heart Disease Father     Hypertension Father     Diabetes Father     Cancer Maternal Grandfather         lung    Deep Vein Thrombosis Paternal Grandmother     Hypertension Paternal Grandmother          ROS:  All negative except as above.      EXAM:  /65 (BP Location: Right arm, Cuff Size: Adult Regular)   Pulse 73   Ht 1.567 m (5' 1.7\")   Wt 57.7 kg (127 lb 1.6 oz)   LMP 09/24/2023 (Exact Date)   SpO2 100%   BMI 23.47 kg/m    General:  WNWD female, NAD  Alert  Oriented x 3  Gait:  Normal  Skin:  Normal skin turgor  Neurologic:  CN grossly intact, good sensation.    HEENT:  NC/AT, EOMI  Neck:  No masses palpated, symmetrical, carotids +2/4, no bruits heard  Heart:  RRR  Lungs:  Clear   Breasts:  Symmetrical, no dimpling noted, no masses palpated, no discharge expressed  Abdomen:  Non-tender, non-distended.  Vulva: No external lesions, normal hair distribution, no adenopathy  BUS:  Normal, no masses noted  Urethra:  No hypermobility noted  Urethral meatus:  No masses noted  Vagina: Moist, pink, no abnormal discharge, well rugated, no lesions  Cervix: Smooth, pink, no visible lesions  Uterus: Normal size, anteverted, non-tender, mobile  Ovaries: No mass, non-tender, mobile  Perianal:  No masses noted.    Extremities:  No clubbing, cyanosis, or edema      ASSESSMENT/PLAN   Annual examination with pap smear   Low fat diet, weight bearing exercises and self breast exams on a monthly basis have been recommended.  I have discussed with patient the risks, benefits, medications, treatment options and modalities.   I have instructed the patient to call or schedule a follow-up appointment if any problems.    "

## 2023-10-20 ENCOUNTER — IMMUNIZATION (OUTPATIENT)
Dept: FAMILY MEDICINE | Facility: CLINIC | Age: 43
End: 2023-10-20
Payer: COMMERCIAL

## 2023-10-20 PROCEDURE — 90686 IIV4 VACC NO PRSV 0.5 ML IM: CPT

## 2023-10-20 PROCEDURE — 99207 PR NO CHARGE NURSE ONLY: CPT

## 2023-10-20 PROCEDURE — 90471 IMMUNIZATION ADMIN: CPT

## 2023-10-23 LAB
BKR LAB AP GYN ADEQUACY: NORMAL
BKR LAB AP GYN INTERPRETATION: NORMAL
BKR LAB AP HPV REFLEX: NORMAL
BKR LAB AP LMP: NORMAL
BKR LAB AP PREVIOUS ABNORMAL: NORMAL
PATH REPORT.COMMENTS IMP SPEC: NORMAL
PATH REPORT.COMMENTS IMP SPEC: NORMAL
PATH REPORT.RELEVANT HX SPEC: NORMAL

## 2023-10-25 LAB
HUMAN PAPILLOMA VIRUS 16 DNA: NEGATIVE
HUMAN PAPILLOMA VIRUS 18 DNA: NEGATIVE
HUMAN PAPILLOMA VIRUS FINAL DIAGNOSIS: NORMAL
HUMAN PAPILLOMA VIRUS OTHER HR: NEGATIVE

## 2023-12-07 ENCOUNTER — MYC MEDICAL ADVICE (OUTPATIENT)
Dept: OBGYN | Facility: CLINIC | Age: 43
End: 2023-12-07
Payer: COMMERCIAL

## 2023-12-08 NOTE — TELEPHONE ENCOUNTER
"Pt is writing in stating she has been experiencing some heavy bleeding this week.    Per 10/19/23 OV notes:  \"She had one month of absent menses.  She will monitor and if irregularity occurs, she is encouraged to return for evaluation and workup.\"    "

## 2023-12-12 ENCOUNTER — OFFICE VISIT (OUTPATIENT)
Dept: OBGYN | Facility: CLINIC | Age: 43
End: 2023-12-12
Payer: COMMERCIAL

## 2023-12-12 VITALS
HEART RATE: 70 BPM | DIASTOLIC BLOOD PRESSURE: 59 MMHG | WEIGHT: 127.6 LBS | SYSTOLIC BLOOD PRESSURE: 92 MMHG | TEMPERATURE: 97.7 F | BODY MASS INDEX: 23.48 KG/M2 | HEIGHT: 62 IN | RESPIRATION RATE: 14 BRPM

## 2023-12-12 DIAGNOSIS — N93.9 ABNORMAL UTERINE BLEEDING (AUB): Primary | ICD-10-CM

## 2023-12-12 LAB
ERYTHROCYTE [DISTWIDTH] IN BLOOD BY AUTOMATED COUNT: 12 % (ref 10–15)
HCT VFR BLD AUTO: 35.6 % (ref 35–47)
HGB BLD-MCNC: 12.1 G/DL (ref 11.7–15.7)
MCH RBC QN AUTO: 30.6 PG (ref 26.5–33)
MCHC RBC AUTO-ENTMCNC: 34 G/DL (ref 31.5–36.5)
MCV RBC AUTO: 90 FL (ref 78–100)
PLATELET # BLD AUTO: 268 10E3/UL (ref 150–450)
RBC # BLD AUTO: 3.95 10E6/UL (ref 3.8–5.2)
TSH SERPL DL<=0.005 MIU/L-ACNC: 3.16 UIU/ML (ref 0.3–4.2)
WBC # BLD AUTO: 6.3 10E3/UL (ref 4–11)

## 2023-12-12 PROCEDURE — 58100 BIOPSY OF UTERUS LINING: CPT | Performed by: OBSTETRICS & GYNECOLOGY

## 2023-12-12 PROCEDURE — 84146 ASSAY OF PROLACTIN: CPT | Performed by: OBSTETRICS & GYNECOLOGY

## 2023-12-12 PROCEDURE — 84443 ASSAY THYROID STIM HORMONE: CPT | Performed by: OBSTETRICS & GYNECOLOGY

## 2023-12-12 PROCEDURE — 85027 COMPLETE CBC AUTOMATED: CPT | Performed by: OBSTETRICS & GYNECOLOGY

## 2023-12-12 PROCEDURE — 99214 OFFICE O/P EST MOD 30 MIN: CPT | Mod: 25 | Performed by: OBSTETRICS & GYNECOLOGY

## 2023-12-12 PROCEDURE — 88305 TISSUE EXAM BY PATHOLOGIST: CPT | Performed by: PATHOLOGY

## 2023-12-12 PROCEDURE — 36415 COLL VENOUS BLD VENIPUNCTURE: CPT | Performed by: OBSTETRICS & GYNECOLOGY

## 2023-12-12 RX ORDER — TRANEXAMIC ACID 650 MG/1
1300 TABLET ORAL 2 TIMES DAILY
Qty: 20 TABLET | Refills: 3 | Status: SHIPPED | OUTPATIENT
Start: 2023-12-12 | End: 2023-12-17

## 2023-12-12 NOTE — PROGRESS NOTES
[facility]    Gynecology Office Note    CC:   Chief Complaint   Patient presents with    Consult        HPI: Lorraine Huston is a 43 year old  who presents for AUB. Reports she is currently having a heavy, long menses. This is the second occurrence of such event. This happened once prior about a year and a half ago after receiving the COVID vaccination. She went more than two months with no menses, then had a very heavy period. Evaluation with pelvic US and EMB at that time was negative. She was started on OCPs which regulated her menses but she was having side effects and stopped the OCPs after about 4-5 months. Reports normal menses after discontinuation until this past month. She had about 10 days of lighter spotting, then having heavy flow for the past 8+ days. She uses a menstrual cup and having approximately 100cc of blood loss per day. She is starting to feel fatigued and having occasional shortness of breath.      GYN Hx:      has vasectomy, completed semen analysis with negative semen. Declines pregnancy testing.     Patient's last menstrual period was 2023 (exact date).    Last Pap Smear: 10/2023 NIL, HPV negative  Lab Results   Component Value Date    PAP NIL 01/10/2019       ROS: A 10 pt ROS was completed and found to be otherwise negative unless mentioned in the HPI.     PMH:   Past Medical History:   Diagnosis Date    Family history of diabetes mellitus     Infertility     clomid pregnancy       PSHx:   Past Surgical History:   Procedure Laterality Date    C/SECTION, LOW TRANSVERSE  09    , Low Transverse       OBHx:   OB History    Para Term  AB Living   2 2 0 2 0 2   SAB IAB Ectopic Multiple Live Births   0 0 0 1 1      # Outcome Date GA Lbr Connor/2nd Weight Sex Delivery Anes PTL Lv   2  09 36w5d  2.325 kg (5 lb 2 oz) M CS-Unspec   SY      Birth Comments: Twin B      Name: Kylah Huston      Apgar1: 8  Apgar5:  8   1  09 36w5d  2.045 kg (4 lb 8.1 oz) F CS-Unspec   SY      Birth Comments: Twin A      Name: Alysia Huston      Apgar1: 8  Apgar5: 9       Medications:   ascorbic acid (VITAMIN C) 250 MG CHEW chewable tablet, Take 250 mg by mouth daily  CALCIUM PO,   fish oil-omega-3 fatty acids 500 MG capsule, 500 capsules daily  Multiple Vitamins-Minerals (MULTIVITAMIN & MINERAL PO),     No current facility-administered medications on file prior to visit.      Allergies:      Allergies   Allergen Reactions    Hydrocodone        Social History:   Social History     Socioeconomic History    Marital status:      Spouse name: Not on file    Number of children: Not on file    Years of education: Not on file    Highest education level: Not on file   Occupational History    Occupation: Paraprofessional   Tobacco Use    Smoking status: Never    Smokeless tobacco: Never   Vaping Use    Vaping Use: Never used   Substance and Sexual Activity    Alcohol use: No    Drug use: No    Sexual activity: Yes     Partners: Male     Birth control/protection: Male Surgical     Comment: vasectomy   Other Topics Concern     Service No    Blood Transfusions No    Caffeine Concern No    Occupational Exposure No    Hobby Hazards Yes     Comment: down hill ski    Sleep Concern No    Stress Concern No    Weight Concern No    Special Diet Yes     Comment: eat healthy (not a milk drinker drink calcium OJ    Back Care Yes     Comment: Left upper and Left shoulder    Exercise Yes    Bike Helmet Not Asked     Comment: N/A    Seat Belt Yes    Self-Exams Yes    Parent/sibling w/ CABG, MI or angioplasty before 65F 55M? No   Social History Narrative    2018    ENVIRONMENTAL HISTORY: The family [place] an 8 [place]. The home is heated with a forced air. They do have central air conditioning. The patient's bedroom is furnished with carpeting in bedroom. No pets inside the house. There is no  "history of cockroach or mice infestation. There are no smokers in the house.  The house does not have a damp basement.      Social Determinants of Health     Financial Resource Strain: Not on file   Food Insecurity: Not on file   Transportation Needs: Not on file   Physical Activity: Not on file   Stress: Not on file   Social Connections: Not on file   Interpersonal Safety: Not on file   Housing Stability: Not on file         Family History:   Family History   Problem Relation Age of Onset    Diabetes Maternal Grandmother     Heart Disease Father     Hypertension Father     Diabetes Father     Cancer Maternal Grandfather         lung    Deep Vein Thrombosis Paternal Grandmother     Hypertension Paternal Grandmother        Physical Exam:   Vitals:    12/12/23 1346   BP: 92/59   BP Location: Right arm   Patient Position: Sitting   Cuff Size: Adult Regular   Pulse: 70   Resp: 14   Temp: 97.7  F (36.5  C)   TempSrc: Tympanic   Weight: 57.9 kg (127 lb 9.6 oz)   Height: 1.562 m (5' 1.5\")      Estimated body mass index is 23.72 kg/m  as calculated from the following:    Height as of this encounter: 1.562 m (5' 1.5\").    Weight as of this encounter: 57.9 kg (127 lb 9.6 oz).    General appearance: well-hydrated, A&O x 3, no apparent distress  Lungs: Equal expansion bilaterally, no accessory muscle use  Heart: No heaves or thrills.   Constitutional: See vitals  Neuro: CN II-XII grossly intact  Genitourinary:  External genitalia: no erythema, no lesions.   Anus and Perineum: Unremarkable, no visible lesions  Vagina: Normal, healthy pink mucosa without any lesions. Moderate amount of vaginal bleeding  Cervix: normal appearance, no cervical motion tenderness.   Uterus: normal size, shape and consistency.     Labs/Imaging: reviewed prior pelvic US, EMB, TSH from 2022. Hx elevated TSH and +thyroid peroxidase antibodies       Endometrial Biopsy Procedure Note      Lorraine Huston  1980  [id number]    Indications:    Lorraine BALTAZAR " Robel is a 43 year old year old female, who is having an endometrial biopsy for abnormal uterine bleeding.    Procedure:  Is a pregnancy test required: No.  has vasectomy.    Prior to the start of the procedure, written and verbal consent was obtained from the patient. The patient was then placed in the dorsal lithotomy position.  A speculum was placed in the vagina and the cervix visualized. The cervix was cleaned with betadine swabs x3. A tenaculum was placed on the cervix. A small plastic 5 mm Pipelle syringe curette was passed through the cervix to the fundus with return of moderate amount of tissue. This was placed in specimen jar and sent for permanent pathology. All instruments were removed.      The patient tolerated the procedure fairly well.    Post Procedure:    PLAN : Await the results of the biopsy. There were no complications. Patient was discharged in stable condition.    The patient was given post op instructions which included activity and pelvic restrictions.  She was advised to call the clinic if excessive bleeding, pelvic pain, or fever.     Follow-up based on results.         Assessment and Plan:     Encounter Diagnosis   Name Primary?    Abnormal uterine bleeding (AUB) Yes     Lorraine was seen today for consult.    Diagnoses and all orders for this visit:    Abnormal uterine bleeding (AUB)  -     TSH with free T4 reflex; Future  -     US Pelvic Complete with Transvaginal; Future  -     Prolactin; Future  -     CBC with platelets; Future  -     tranexamic acid (LYSTEDA) 650 MG tablet; Take 2 tablets (1,300 mg) by mouth 2 times daily for 5 days  -     Surgical Pathology Exam  -     TSH with free T4 reflex  -     Prolactin  -     CBC with platelets  -     ENDOMETRIAL BIOPSY W/O CERVICAL DILATION        Patient presents with second episode of heavy menses. Discussed repeat evaluation as it has now been 1.5 years since prior imaging and labs. EMB also collected today. Discussed acute and  longer term management options if this becomes more frequent. She did had side effects from OCPs in the past and does not want systemic hormones. Rx for Lysteda sent. Also discussed longer term management if this becomes recurrent, including Mirena IUD, which she will consider. Plan further follow up based on results and response to treatment.    Health maintenance: pap smear UTD and normal.    Return to clinic as needed based on results.    Annemarie Mark,

## 2023-12-12 NOTE — NURSING NOTE
"Initial BP 92/59 (BP Location: Right arm, Patient Position: Sitting, Cuff Size: Adult Regular)   Pulse 70   Temp 97.7  F (36.5  C) (Tympanic)   Resp 14   Ht 1.562 m (5' 1.5\")   Wt 57.9 kg (127 lb 9.6 oz)   LMP 09/24/2023 (Exact Date)   BMI 23.72 kg/m   Estimated body mass index is 23.72 kg/m  as calculated from the following:    Height as of this encounter: 1.562 m (5' 1.5\").    Weight as of this encounter: 57.9 kg (127 lb 9.6 oz). .      "

## 2023-12-13 LAB — PROLACTIN SERPL 3RD IS-MCNC: 15 NG/ML (ref 5–23)

## 2023-12-21 ENCOUNTER — ANCILLARY PROCEDURE (OUTPATIENT)
Dept: ULTRASOUND IMAGING | Facility: CLINIC | Age: 43
End: 2023-12-21
Attending: OBSTETRICS & GYNECOLOGY
Payer: COMMERCIAL

## 2023-12-21 DIAGNOSIS — N93.9 ABNORMAL UTERINE BLEEDING (AUB): ICD-10-CM

## 2023-12-21 PROCEDURE — 76830 TRANSVAGINAL US NON-OB: CPT

## 2023-12-21 PROCEDURE — 76856 US EXAM PELVIC COMPLETE: CPT

## 2023-12-22 ENCOUNTER — MYC MEDICAL ADVICE (OUTPATIENT)
Dept: OBGYN | Facility: CLINIC | Age: 43
End: 2023-12-22
Payer: COMMERCIAL

## 2023-12-22 ENCOUNTER — TELEPHONE (OUTPATIENT)
Dept: OBGYN | Facility: CLINIC | Age: 43
End: 2023-12-22
Payer: COMMERCIAL

## 2023-12-22 NOTE — TELEPHONE ENCOUNTER
Appointment scheduled for follow up on Wednesday 12/27 at 11:30 am virtually.    Pt in agreement and reports understanding.    Gina NAGY   Ob/Gyn Clinic

## 2023-12-22 NOTE — TELEPHONE ENCOUNTER
[facility] Call Center    Phone Message    May a detailed message be left on voicemail: yes     Reason for Call: Other: Patient is calling to discuss her results with the provider or care team and discuss possible treatments. Please call her back. The next opening with this provider is not until January 31st. Thank you     Action Taken: Other: obgyn    Travel Screening: Not Applicable

## 2023-12-27 ENCOUNTER — TELEPHONE (OUTPATIENT)
Dept: OBGYN | Facility: CLINIC | Age: 43
End: 2023-12-27

## 2023-12-27 ENCOUNTER — VIRTUAL VISIT (OUTPATIENT)
Dept: OBGYN | Facility: CLINIC | Age: 43
End: 2023-12-27
Payer: COMMERCIAL

## 2023-12-27 DIAGNOSIS — R93.89 THICKENED ENDOMETRIUM: ICD-10-CM

## 2023-12-27 DIAGNOSIS — N93.9 ABNORMAL UTERINE BLEEDING (AUB): ICD-10-CM

## 2023-12-27 DIAGNOSIS — N84.0 ENDOMETRIAL POLYP: Primary | ICD-10-CM

## 2023-12-27 PROCEDURE — 99207 PR NO BILLABLE SERVICE THIS VISIT: CPT | Performed by: OBSTETRICS & GYNECOLOGY

## 2023-12-27 RX ORDER — FERROUS SULFATE 325(65) MG
325 TABLET ORAL
COMMUNITY

## 2023-12-27 RX ORDER — CHOLECALCIFEROL (VITAMIN D3) 50 MCG
1 TABLET ORAL DAILY
COMMUNITY

## 2023-12-27 NOTE — PROGRESS NOTES
Lorraine is a 43 year old who is being evaluated via a billable telephone visit.      What phone number would you like to be contacted at? [phone]    How would you like to obtain your AVS? Francisca    Distant Location (provider location):  On-site  Phone call duration: 19  minutes  Start time: 12:06  End time: 12:25  Assessment & Plan   Problem List Items Addressed This Visit    None  Visit Diagnoses       Endometrial polyp    -  Primary    Thickened endometrium        Abnormal uterine bleeding (AUB)               Patient presents for follow-up to discuss results.  She had an endometrial biopsy and pelvic ultrasound performed due to history of abnormal bleeding.  Endometrial biopsy showed no abnormal cells but did show likely endometrial polyp.  Pelvic ultrasound was completed which showed thickened endometrium of 25 mm.  Discussed that these findings, in association with the patient's symptoms, are both consistent with endometrial polyp.  Due to her ongoing symptoms, discussed option for doing procedure of hysteroscopy with dilation and curettage and polypectomy.  Alternative option discussed of monitoring symptoms.  Discussed proposed procedure with patient and hopeful postoperative outcome.  Did discuss that even if polyp was found and was removed, there is a risk for regrowth of endometrial polyps and/or a recurrence of symptoms.  This could possibly require future medication or other surgical intervention.    Patient would prefer to have the procedure completed closer to home.  She has also been seeing Dr. Parekh out of [place].  Will reach out to coordinate care.    Annemarie Mark DO  [facility] [place]    Carroll Peters is a 43 year old, presenting for the following health issues:  Follow Up (Pelvic Ultrasound results.)    HPI     Patient presents for results review.  Reports she has continued to have intermenstrual spotting.  Bleeding increases after exercise, especially with  weightlifting.  At times the bleeding has been heavy and bright red.      Objective           Vitals:  No vitals were obtained today due to virtual visit.    Physical Exam   healthy, alert, and no distress  PSYCH: Alert and oriented times 3; coherent speech, normal   rate and volume, able to articulate logical thoughts, Her affect is normal  RESP: No cough, no audible wheezing, able to talk in full sentences  Remainder of exam unable to be completed due to telephone visits

## 2023-12-27 NOTE — TELEPHONE ENCOUNTER
[facility] SURGERY PLANNING/SCHEDULING WORKSHEET                                                     Lorraine Huston                :  1980  MRN:  [id number]  Home Phone [phone]   Work Phone Not on file.   Mobile [phone]         Surgeon: Soren Parekh MD    DIAGNOSIS:   endometrial polyp / abnormal uterine bleeding     SURGICAL PROCEDURE:  hysteroscopy and D&C    Surgery Location:  [facility]  Patient Surgery Class:  SDS  Length of Procedure:  30 minutes  Type of anesthesia:  MAC    Multi-surgeon case: no  OR Assistant needed:   No  Vendor needed: No  Positioning:  Lithotomy  Laterality:  NA  Date requested:   when available    Special Equipment: MyoSure   Special Instructions for patient:  Per the [facility] [place] when they call the patient prior to the surgery date.   Precautions:  NONE  :  NOT NEEDED    Sterilization consent:  Not applicable to procedure being performed.    Preop: Pre-op options: PCP  Pre-surgery consult needed:  Which ever works best for her:    1)  Before the surgery day please schedule a 30 minute phone call  to discuss details of  the surgery.   2)  Before the surgery day please schedule a 30 minute IN PERSON appt to discuss the details of the surgery.  Postop evaluation needed:  2 weeks    ALLERGIES:   Allergies   Allergen Reactions    Hydrocodone       BMI:There is no height or weight on file to calculate BMI.         The proposed surgical procedure is considered LOW risk.      Soren Paerkh MD    2023

## 2023-12-28 ENCOUNTER — PREP FOR PROCEDURE (OUTPATIENT)
Dept: OBGYN | Facility: CLINIC | Age: 43
End: 2023-12-28
Payer: COMMERCIAL

## 2023-12-28 DIAGNOSIS — N93.9 ABNORMAL UTERINE BLEEDING DUE TO ENDOMETRIAL POLYP: Primary | ICD-10-CM

## 2023-12-28 DIAGNOSIS — N84.0 ABNORMAL UTERINE BLEEDING DUE TO ENDOMETRIAL POLYP: Primary | ICD-10-CM

## 2024-01-02 ENCOUNTER — TELEPHONE (OUTPATIENT)
Dept: OBGYN | Facility: CLINIC | Age: 44
End: 2024-01-02

## 2024-01-02 NOTE — TELEPHONE ENCOUNTER
Associated Diagnoses    Abnormal uterine bleeding due to endometrial polyp [N93.9, N84.0]  - Primary        Source Order Set    Order Set Name Order ID    [id number]     Case Request: Case Info    Panel 1    Providers    Provider Role Service   Soren Parekh MD Primary      Procedures    Procedure Laterality Anesthesia Region   HYSTEROSCOPY, WITH DILATION AND CURETTAGE OF UTERUS USING MORCELLATOR N/A MAC Vagina                  Requested date:   Location:  OR   Patient class: Outpatient      Pre-op diagnoses: Abnormal uterine bleeding due to endometrial polyp     Scheduling Instructions    Additional Instructions for the Case  Surgical Assistant: No  Multi Surgeon Case No  CSC okay  H&P:  Pre-op options: PCP  Post-op:  2 weeks  Sterilization consent:  Not applicable to procedure being performed.  Vendor: Yes: Myosure  Surgical time needed: 30 Minutes  ERAS patient: NA  If scheduling for D&E does Laminaria placement: No    Patient needs 30 minute in person or phone visit prior to discuss details of the surgery.   SURGERY SCHEDULING AND PRECERTIFICATION    Medical Record Number: [id number]  Lorraine Huston  YOB: 1980   Phone: [phone] (home)   Primary Provider: Frank  [facility]    Reason for Admit:  ICD-10 CODE:  N93.9, N84.0    Surgeon: Soren Parekh MD  Surgical Procedure: HYSTEROSCOPY, WITH DILATION AND CURETTAGE OF UTERUS USING MORCELLATOR    Date of Surgery 02/01 Time of Surgery 12:20pm  Surgery to be performed at:  [facility]   Status: Outpatient  Type of Anesthesia Anticipated: MAC    Sterilization consent:  Not applicable to procedure being performed.    Pre-Op: On 01/16 with Dr Joo Oro  COVID testing:  Per Provider's discretion Covid testing is not indicated.     Post-Op:  2 weeks on 02/16 with Dr Iglesia Oro    Pre-certification routed to Financial Counselors:  Auto routes via Case Request    Surgery packet  mailed to patient's home address: Yes  Patient instructed NPO 12 hours prior to surgery, arrive according to the time the nurse gives patient when called prior to surgery, must have a .  Patient understood and agrees to the plan.      Requestor:  Macrina Davis     Location:  [place] [id number]

## 2024-01-04 ENCOUNTER — MYC MEDICAL ADVICE (OUTPATIENT)
Dept: OBGYN | Facility: CLINIC | Age: 44
End: 2024-01-04
Payer: COMMERCIAL

## 2024-01-04 PROBLEM — N93.9 ABNORMAL UTERINE BLEEDING DUE TO ENDOMETRIAL POLYP: Status: ACTIVE | Noted: 2023-12-28

## 2024-01-04 PROBLEM — N84.0 ABNORMAL UTERINE BLEEDING DUE TO ENDOMETRIAL POLYP: Status: ACTIVE | Noted: 2023-12-28

## 2024-01-16 ENCOUNTER — OFFICE VISIT (OUTPATIENT)
Dept: FAMILY MEDICINE | Facility: CLINIC | Age: 44
End: 2024-01-16
Payer: COMMERCIAL

## 2024-01-16 ENCOUNTER — OFFICE VISIT (OUTPATIENT)
Dept: OBGYN | Facility: CLINIC | Age: 44
End: 2024-01-16
Payer: COMMERCIAL

## 2024-01-16 VITALS
TEMPERATURE: 98.5 F | WEIGHT: 128 LBS | DIASTOLIC BLOOD PRESSURE: 78 MMHG | BODY MASS INDEX: 24.17 KG/M2 | SYSTOLIC BLOOD PRESSURE: 115 MMHG | HEART RATE: 82 BPM | OXYGEN SATURATION: 99 % | HEIGHT: 61 IN

## 2024-01-16 VITALS
BODY MASS INDEX: 24.2 KG/M2 | DIASTOLIC BLOOD PRESSURE: 70 MMHG | HEIGHT: 61 IN | SYSTOLIC BLOOD PRESSURE: 106 MMHG | HEART RATE: 74 BPM | WEIGHT: 128.2 LBS | OXYGEN SATURATION: 100 %

## 2024-01-16 DIAGNOSIS — Z01.818 PREOP GENERAL PHYSICAL EXAM: Primary | ICD-10-CM

## 2024-01-16 DIAGNOSIS — N93.9 ABNORMAL UTERINE BLEEDING DUE TO ENDOMETRIAL POLYP: Primary | ICD-10-CM

## 2024-01-16 DIAGNOSIS — R93.89 THICKENED ENDOMETRIUM: ICD-10-CM

## 2024-01-16 DIAGNOSIS — D50.0 IRON DEFICIENCY ANEMIA DUE TO CHRONIC BLOOD LOSS: ICD-10-CM

## 2024-01-16 DIAGNOSIS — E78.5 HYPERLIPIDEMIA LDL GOAL <130: ICD-10-CM

## 2024-01-16 DIAGNOSIS — N84.0 ABNORMAL UTERINE BLEEDING DUE TO ENDOMETRIAL POLYP: ICD-10-CM

## 2024-01-16 DIAGNOSIS — N93.9 ABNORMAL UTERINE BLEEDING DUE TO ENDOMETRIAL POLYP: ICD-10-CM

## 2024-01-16 DIAGNOSIS — N84.0 ABNORMAL UTERINE BLEEDING DUE TO ENDOMETRIAL POLYP: Primary | ICD-10-CM

## 2024-01-16 PROCEDURE — 99417 PROLNG OP E/M EACH 15 MIN: CPT | Performed by: OBSTETRICS & GYNECOLOGY

## 2024-01-16 PROCEDURE — 99214 OFFICE O/P EST MOD 30 MIN: CPT | Performed by: FAMILY MEDICINE

## 2024-01-16 PROCEDURE — 99215 OFFICE O/P EST HI 40 MIN: CPT | Performed by: OBSTETRICS & GYNECOLOGY

## 2024-01-16 ASSESSMENT — ASTHMA QUESTIONNAIRES
ACT_TOTALSCORE: 24
QUESTION_3 LAST FOUR WEEKS HOW OFTEN DID YOUR ASTHMA SYMPTOMS (WHEEZING, COUGHING, SHORTNESS OF BREATH, CHEST TIGHTNESS OR PAIN) WAKE YOU UP AT NIGHT OR EARLIER THAN USUAL IN THE MORNING: NOT AT ALL
QUESTION_1 LAST FOUR WEEKS HOW MUCH OF THE TIME DID YOUR ASTHMA KEEP YOU FROM GETTING AS MUCH DONE AT WORK, SCHOOL OR AT HOME: NONE OF THE TIME
ACT_TOTALSCORE: 24
QUESTION_2 LAST FOUR WEEKS HOW OFTEN HAVE YOU HAD SHORTNESS OF BREATH: ONCE OR TWICE A WEEK
QUESTION_4 LAST FOUR WEEKS HOW OFTEN HAVE YOU USED YOUR RESCUE INHALER OR NEBULIZER MEDICATION (SUCH AS ALBUTEROL): NOT AT ALL
QUESTION_5 LAST FOUR WEEKS HOW WOULD YOU RATE YOUR ASTHMA CONTROL: COMPLETELY CONTROLLED

## 2024-01-16 ASSESSMENT — PAIN SCALES - GENERAL: PAINLEVEL: NO PAIN (0)

## 2024-01-16 NOTE — PROGRESS NOTES
[facility]  [address]  [place] [address]  Phone: [phone]  Primary Provider: Frank - [facility]  Pre-op Performing Provider: MARTIN KENNY      PREOPERATIVE EVALUATION:  Today's date: 1/16/2024    Lorraine is a 43 year old, presenting for the following:  Pre-Op Exam        1/16/2024     2:14 PM   Additional Questions   Roomed by Shanika       Surgical Information:  Surgery/Procedure: HYSTEROSCOPY, WITH DILATION AND CURETTAGE OF UTERUS USING MORCELLATOR   [facility]   Surgeon: Dr. Parekh  Surgery Date: February 1, 2024  Time of Surgery: 12:20 pm  Where patient plans to recover: At home with family  Fax number for surgical facility: Note does not need to be faxed, will be available electronically in Epic.    Assessment & Plan     The proposed surgical procedure is considered INTERMEDIATE risk.    Preop general physical exam    - HCG Qual, Urine ([id number]); Future  - Hemoglobin; Future    Hyperlipidemia LDL goal <130      Abnormal uterine bleeding due to endometrial polyp  As above            - No identified additional risk factors other than previously addressed    Antiplatelet or Anticoagulation Medication Instructions:   - Patient is on no antiplatelet or anticoagulation medications.    Additional Medication Instructions:  Patient is to take all scheduled medications on the day of surgery EXCEPT for modifications listed below:   - Herbal medications and vitamins: HOLD 14 days prior to surgery.    RECOMMENDATION:  APPROVAL GIVEN to proceed with proposed procedure, without further diagnostic evaluation.            Subjective       HPI related to upcoming procedure: pt scheduled for menorrhagia  Ultrasound showed                                           Impression: Endometrial stripe is thicker than would be expected post  menstruation. Otherwise negative pelvic ultrasound     LMP -at Present  Periods have been Irregular        1/15/2024     3:38 PM    Preop Questions   1. Have you ever had a heart attack or stroke? No   2. Have you ever had surgery on your heart or blood vessels, such as a stent placement, a coronary artery bypass, or surgery on an artery in your head, neck, heart, or legs? No   3. Do you have chest pain with activity? None   4. Do you have a history of  heart failure? No   5. Do you currently have a cold, bronchitis or symptoms of other infection? No   6. Do you have a cough, shortness of breath, or wheezing? No   7. Do you or anyone in your family have previous history of blood clots? YES - grandmother   8. Do you or does anyone in your family have a serious bleeding problem such as prolonged bleeding following surgeries or cuts? No   9. Have you ever had problems with anemia or been told to take iron pills? YES - in the past   10. Have you had any abnormal blood loss such as black, tarry or bloody stools, or abnormal vaginal bleeding? YES - menorrhagia   11. Have you ever had a blood transfusion? No   12. Are you willing to have a blood transfusion if it is medically needed before, during, or after your surgery? Yes   13. Have you or any of your relatives ever had problems with anesthesia? UNKNOWN - ? Shaky after     14. Do you have sleep apnea, excessive snoring or daytime drowsiness? No   15. Do you have any artifical heart valves or other implanted medical devices like a pacemaker, defibrillator, or continuous glucose monitor? No   16. Do you have artificial joints? No   17. Are you allergic to latex? YES: had a patch test and latex -rash   18. Is there any chance that you may be pregnant? No       Health Care Directive:  Patient does not have a Health Care Directive or Living Will: Discussed advance care planning with patient; information given to patient to review.    Preoperative Review of :   reviewed - no record of controlled substances prescribed.      Status of Chronic Conditions:  ASTHMA - Patient has a longstanding  history of moderate-severe Asthma . Patient has been doing well overall noting NO SYMPTOMS and continues on medication regimen consisting of inhalers PRN without adverse reactions or side effects.     Review of Systems  CONSTITUTIONAL: NEGATIVE for fever, chills, change in weight  INTEGUMENTARY/SKIN: NEGATIVE for worrisome rashes, moles or lesions  EYES: NEGATIVE for vision changes or irritation  ENT/MOUTH: NEGATIVE for ear, mouth and throat problems  RESP: NEGATIVE for significant cough or SOB  CV: NEGATIVE for chest pain, palpitations or peripheral edema  GI: NEGATIVE for nausea, abdominal pain, heartburn, or change in bowel habits   female: as above  MUSCULOSKELETAL: NEGATIVE for significant arthralgias or myalgia  NEURO: NEGATIVE for weakness, dizziness or paresthesias  ENDOCRINE: NEGATIVE for temperature intolerance, skin/hair changes  HEME: NEGATIVE for bleeding problems  PSYCHIATRIC: NEGATIVE for changes in mood or affect    Patient Active Problem List    Diagnosis Date Noted    Abnormal uterine bleeding due to endometrial polyp 2023     Priority: Medium    Hyperlipidemia LDL goal <130 2022     Priority: Medium    ASCUS of cervix with negative high risk HPV 2006     Priority: Medium     06 ASCUS pap, Neg HPV  , , , ,  NIL pap  13 NIL pap, Neg HR HPV  1/10/19 NIL pap, Neg HR HPV  10/19/23 NIL pap, Neg HR HPV. Routine screening        Past Medical History:   Diagnosis Date    Family history of diabetes mellitus     Infertility     clomid pregnancy    Uncomplicated asthma     Under controlled. Asthma only when having a very bad cough     Past Surgical History:   Procedure Laterality Date    ABDOMEN SURGERY  09    C section (twin babies)    C/SECTION, LOW TRANSVERSE  2009    , Low Transverse     Current Outpatient Medications   Medication Sig Dispense Refill    ascorbic acid (VITAMIN C) 250 MG CHEW chewable tablet Take 250 mg by mouth daily    "   ferrous sulfate (FEROSUL) 325 (65 Fe) MG tablet Take 325 mg by mouth daily (with breakfast)      fish oil-omega-3 fatty acids 500 MG capsule 500 capsules daily      Multiple Vitamins-Minerals (MULTIVITAMIN & MINERAL PO)       vitamin D3 (CHOLECALCIFEROL) 50 mcg (2000 units) tablet Take 1 tablet by mouth daily         Allergies   Allergen Reactions    Hydrocodone     Latex      From allergy test    Methylisothiazolinone      From allergy testing    Thimerosal      From allergy testing        Social History     Tobacco Use    Smoking status: Never    Smokeless tobacco: Never   Substance Use Topics    Alcohol use: No     Family History   Problem Relation Age of Onset    Diabetes Maternal Grandmother     Heart Disease Father     Hypertension Father     Diabetes Father     Cancer Maternal Grandfather         lung    Deep Vein Thrombosis Paternal Grandmother     Hypertension Paternal Grandmother     Asthma Brother      History   Drug Use No         Objective     /78   Pulse 82   Temp 98.5  F (36.9  C) (Temporal)   Ht 1.549 m (5' 0.98\")   Wt 58.1 kg (128 lb)   LMP 01/15/2024 (Approximate)   SpO2 99%   BMI 24.20 kg/m      Physical Exam    GENERAL APPEARANCE: healthy, alert and no distress     EYES: EOMI, PERRL     HENT: ear canals and TM's normal and nose and mouth without ulcers or lesions     NECK: no adenopathy, no asymmetry, masses, or scars and thyroid normal to palpation     RESP: lungs clear to auscultation - no rales, rhonchi or wheezes     CV: regular rates and rhythm, normal S1 S2, no S3 or S4 and no murmur, click or rub     ABDOMEN:  soft, nontender, no HSM or masses and bowel sounds normal     MS: extremities normal- no gross deformities noted, no evidence of inflammation in joints, FROM in all extremities.     SKIN: no suspicious lesions or rashes     NEURO: Normal strength and tone, sensory exam grossly normal, mentation intact and speech normal     PSYCH: mentation appears normal. and affect " normal/bright     LYMPHATICS: No cervical adenopathy    Recent Labs   Lab Test 12/12/23  1442 08/16/23  0944 04/06/22  1414   HGB 12.1  --  12.5     --  271   NA  --  140  --    POTASSIUM  --  3.9  --    CR  --  0.61  --         Diagnostics:  Labs pending at this time.  Results will be reviewed when available.   No EKG required, no history of coronary heart disease, significant arrhythmia, peripheral arterial disease or other structural heart disease.    Revised Cardiac Risk Index (RCRI):  The patient has the following serious cardiovascular risks for perioperative complications:   - No serious cardiac risks = 0 points     RCRI Interpretation: 0 points: Class I (very low risk - 0.4% complication rate)       Pt will get labs done liliana week  Signed Electronically by: Lindsay Ge MD  Copy of this evaluation report is provided to requesting physician.

## 2024-01-16 NOTE — PROGRESS NOTES
"Lorraine Huston is a 43 year old female, , with history of abnormal uterine bleeding.  She had \"on and off again vaginal bleeding.  She had this after the COVID vaccine in .   She had workup for this bleeding with the EMB and the ultrasound and the OCPs were used to regulate the cycle.  She stopped the pills after about 4 to 5 months later due to adverse side effects.  She had normal cycles after that time, until .  She had 10 days of spotting and this was followed by heavy bleeding for 14 days.    She has had intermittent spotting for the past month, similar to what she had in December.  She developed heavier bleeding, starting yesterday, and she has had 50 ml today (Diva cup).  She has had associated fatigue and some shortness of breath.  She has been able to work out and she desires to continue to do this.    She had the workup with this episode of bleeding. We reviewed the ultrasound, the ultrasound films and the labs she has had recently, including the endometrial biopsy.        Pelvic Ultrasound-transabdominal and transvaginal  Date: 2023  Comparisons: 3/3/2022  History: Abnormal uterine bleeding. LMP and 2023-12/15/2023   Findings: The uterus measures 8.5 x 5.7 x 5.7cm. The endometrial stripe measures 25 mm thick.  The right and left ovaries measure 3.0 x 1.9 x 1.7 cm and 3.4 x 2.4 x 2.3 cm, respectively. Both ovaries appear normal.                                                         Impression: Endometrial stripe is thicker than would be expected post menstruation. Otherwise negative pelvic ultrasound       Component      Latest Ref Rng 2023  2:42 PM   WBC      4.0 - 11.0 10e3/uL 6.3    RBC Count      3.80 - 5.20 10e6/uL 3.95    Hemoglobin      11.7 - 15.7 g/dL 12.1    Hematocrit      35.0 - 47.0 % 35.6    Platelet Count      150 - 450 10e3/uL 268    TSH      0.30 - 4.20 uIU/mL 3.16    Prolactin      5 - 23 ng/mL 15        The EMB from 2023 is " reviewed:    Final Diagnosis   A. Endometrium, biopsy:  -Disordered proliferative endometrium with focal polypoid changes, metaplasia and stromal breakdown.       Past Medical History:   Diagnosis Date    Family history of diabetes mellitus     Infertility     clomid pregnancy    Uncomplicated asthma     Under controlled. Asthma only when having a very bad cough       Past Surgical History:   Procedure Laterality Date    ABDOMEN SURGERY  09    C section (twin babies)    C/SECTION, LOW TRANSVERSE  2009    , Low Transverse        Allergies   Allergen Reactions    Hydrocodone     Latex      From allergy test    Methylisothiazolinone      From allergy testing    Thimerosal      From allergy testing       Current Outpatient Medications   Medication Sig Dispense Refill    ascorbic acid (VITAMIN C) 250 MG CHEW chewable tablet Take 250 mg by mouth daily      ferrous sulfate (FEROSUL) 325 (65 Fe) MG tablet Take 325 mg by mouth daily (with breakfast)      fish oil-omega-3 fatty acids 500 MG capsule 500 capsules daily      Multiple Vitamins-Minerals (MULTIVITAMIN & MINERAL PO)       vitamin D3 (CHOLECALCIFEROL) 50 mcg (2000 units) tablet Take 1 tablet by mouth daily         Social History     Socioeconomic History    Marital status:      Spouse name: Not on file    Number of children: Not on file    Years of education: Not on file    Highest education level: Not on file   Occupational History    Occupation: Paraprofessional   Tobacco Use    Smoking status: Never    Smokeless tobacco: Never   Vaping Use    Vaping Use: Never used   Substance and Sexual Activity    Alcohol use: No    Drug use: No    Sexual activity: Yes     Partners: Male     Birth control/protection: Male Surgical     Comment: vasectomy   Other Topics Concern     Service No    Blood Transfusions No    Caffeine Concern No    Occupational Exposure No    Hobby Hazards Yes     Comment: down hill ski    Sleep Concern No    Stress  Concern No    Weight Concern No    Special Diet Yes     Comment: eat healthy (not a milk drinker drink calcium OJ    Back Care Yes     Comment: Left upper and Left shoulder    Exercise Yes    Bike Helmet Not Asked     Comment: N/A    Seat Belt Yes    Self-Exams Yes    Parent/sibling w/ CABG, MI or angioplasty before 65F 55M? No   Social History Narrative    November 8, 2018    ENVIRONMENTAL HISTORY: The family [place] The home is heated with a forced air. They do have central air conditioning. The patient's bedroom is furnished with carpeting in bedroom. No pets inside the house. There is no history of cockroach or mice infestation. There are no smokers in the house.  The house does not have a damp basement.      Social Determinants of Health     Financial Resource Strain: Low Risk  (1/15/2024)    Financial Resource Strain     Within the past 12 months, have you or your family members you live with been unable to get utilities (heat, electricity) when it was really needed?: No   Food Insecurity: Low Risk  (1/15/2024)    Food Insecurity     Within the past 12 months, did you worry that your food would run out before you got money to buy more?: No     Within the past 12 months, did the food you bought just not last and you didn t have money to get more?: No   Transportation Needs: Low Risk  (1/15/2024)    Transportation Needs     Within the past 12 months, has lack of transportation kept you from medical appointments, getting your medicines, non-medical meetings or appointments, work, or from getting things that you need?: No   Physical Activity: Not on file   Stress: Not on file   Social Connections: Not on file   Interpersonal Safety: Not on file   Housing Stability: Low Risk  (1/15/2024)    Housing Stability     Do you have housing? : Yes     Are you worried about losing your housing?: No       Family History   Problem Relation Age of Onset    Diabetes Maternal Grandmother  "    Heart Disease Father     Hypertension Father     Diabetes Father     Cancer Maternal Grandfather         lung    Deep Vein Thrombosis Paternal Grandmother     Hypertension Paternal Grandmother     Asthma Brother        Review of Systems:  10 point ROS of systems including Constitutional, Eyes, Respiratory, Cardiovascular, Gastroenterology, Genitourinary, Integumentary, Muscularskeletal, Psychiatric were all negative except for pertinent positives noted in my HPI and in the PMH.      Exam  /70 (BP Location: Right arm, Cuff Size: Adult Regular)   Pulse 74   Ht 1.549 m (5' 1\")   Wt 58.2 kg (128 lb 3.2 oz)   LMP 12/27/2023 (Approximate)   SpO2 100%   BMI 24.22 kg/m    General:  WNWD female, NAD  Alert  Oriented x 3  Gait:  Normal  Skin:  Normal skin turgor  HEENT:  NC/AT, EOMI  Abdomen:  Non-tender, non-distended.  Pelvic exam:  Not performed  Extremities:  No clubbing, no cyanosis and no edema.      Assessment  Abnormal uterine bleeding   Abnormal finding of ultrasound  History of Fe deficiency anemia      Plan  We reviewed the findings to date.  I reviewed with her about the hysteroscopy and the D&C and the MyoSure products used to have concomitant endometrial sampling with the hysteroscopy.  The procedure should not be considered a treatment for the bleeding, but rather further evaluation.  The bleeding may improve for 1 or 2 cycles and then reoccur.  She will likely need to consider another management option for long term management, whether this is medical or surgical.    Together we reviewed the risks and benefits of medical versus surgical therapy.  Medical therapy reviewed included hormonal manipulation with OCP's, Patch, Ring, Depo, or IUD.   We reviewed the aspects of fibroid embolization if fibroids have been noted on evaluation.  We reviewed endometrial ablation versus hysterectomy.  Discussed that endometrial ablation is minimally invasive compared to hysterectomy but may not be definitive. " She will think about these options.      Total time preparing to see patient with reviewing prior encounter and labs, face to face time,  and coordinating care on the same calendar date:  41 minutes in room and 43 minutes additional on phone.     Soren Parekh MD

## 2024-01-25 ENCOUNTER — LAB (OUTPATIENT)
Dept: LAB | Facility: CLINIC | Age: 44
End: 2024-01-25
Payer: COMMERCIAL

## 2024-01-25 DIAGNOSIS — Z01.818 PREOP GENERAL PHYSICAL EXAM: ICD-10-CM

## 2024-01-25 LAB
HCG UR QL: NEGATIVE
HGB BLD-MCNC: 11.6 G/DL (ref 11.7–15.7)

## 2024-01-25 PROCEDURE — 85018 HEMOGLOBIN: CPT

## 2024-01-25 PROCEDURE — 81025 URINE PREGNANCY TEST: CPT

## 2024-01-25 PROCEDURE — 36415 COLL VENOUS BLD VENIPUNCTURE: CPT

## 2024-01-30 ENCOUNTER — ANESTHESIA EVENT (OUTPATIENT)
Dept: SURGERY | Facility: AMBULATORY SURGERY CENTER | Age: 44
End: 2024-01-30
Payer: COMMERCIAL

## 2024-01-30 NOTE — ANESTHESIA PREPROCEDURE EVALUATION
Anesthesia Pre-Procedure Evaluation    Patient: Lorraine Huston   MRN: [id number] : 1980        Procedure : Procedure(s):  HYSTEROSCOPY, WITH DILATION AND CURETTAGE OF UTERUS USING MORCELLATOR          Past Medical History:   Diagnosis Date    Family history of diabetes mellitus     Infertility     clomid pregnancy    Uncomplicated asthma     Under controlled. Asthma only when having a very bad cough      Past Surgical History:   Procedure Laterality Date    ABDOMEN SURGERY  09    C section (twin babies)    C/SECTION, LOW TRANSVERSE  2009    , Low Transverse      Allergies   Allergen Reactions    Hydrocodone     Latex      From allergy test    Methylisothiazolinone      From allergy testing    Thimerosal      From allergy testing      Social History     Tobacco Use    Smoking status: Never    Smokeless tobacco: Never   Substance Use Topics    Alcohol use: No      Wt Readings from Last 1 Encounters:   24 58.1 kg (128 lb)           Physical Exam    Airway        Mallampati: II   TM distance: > 3 FB   Neck ROM: full   Mouth opening: > 3 cm    Respiratory Devices and Support         Dental       (+) Minor Abnormalities - some fillings, tiny chips      Cardiovascular   cardiovascular exam normal          Pulmonary   pulmonary exam normal                OUTSIDE LABS:  CBC:   Lab Results   Component Value Date    WBC 6.3 2023    WBC 5.5 2022    HGB 11.6 (L) 2024    HGB 12.1 2023    HCT 35.6 2023    HCT 38.2 2022     2023     2022     BMP:   Lab Results   Component Value Date     2023     2021    POTASSIUM 3.9 2023    POTASSIUM 3.7 2021    CHLORIDE 103 2023    CHLORIDE 106 2021    CO2 26 2023    CO2 28 2021    BUN 13.8 2023    BUN 14 2021    CR 0.61 2023    CR 0.65 2021    GLC 89 2023    GLC 86 2022     COAGS: No results found for:  "\"PTT\", \"INR\", \"FIBR\"  POC:   Lab Results   Component Value Date    HCG Negative 01/25/2024     HEPATIC:   Lab Results   Component Value Date    ALBUMIN 4.6 08/16/2023    PROTTOTAL 7.0 08/16/2023    ALT 24 08/16/2023    AST 33 08/16/2023    ALKPHOS 68 08/16/2023    BILITOTAL 0.5 08/16/2023     OTHER:   Lab Results   Component Value Date    TIFFANIE 9.1 08/16/2023    TSH 3.16 12/12/2023    T4 1.00 03/27/2023       Anesthesia Plan    ASA Status:  2    NPO Status:  NPO Appropriate    Anesthesia Type: MAC.     - Reason for MAC: straight local not clinically adequate   Induction: Intravenous, Propofol.   Maintenance: TIVA.        Consents    Anesthesia Plan(s) and associated risks, benefits, and realistic alternatives discussed. Questions answered and patient/representative(s) expressed understanding.     - Discussed:     - Discussed with:  Patient      - Extended Intubation/Ventilatory Support Discussed: No.      - Patient is DNR/DNI Status: No     Use of blood products discussed: No .     Postoperative Care    Pain management: IV analgesics, Oral pain medications, Multi-modal analgesia.   PONV prophylaxis: Dexamethasone or Solumedrol, Ondansetron (or other 5HT-3), Background Propofol Infusion     Comments:               Juventino Peterson MD    I have reviewed the pertinent notes and labs in the chart from the past 30 days and (re)examined the patient.  Any updates or changes from those notes are reflected in this note.                  "

## 2024-01-30 NOTE — RESULT ENCOUNTER NOTE
Vera,    Your urine pregnancy test is normal.     You have mild anemia. Continue the iron supplement. Proceed with surgery as planned.     Monse Dobbs MD

## 2024-02-01 ENCOUNTER — HOSPITAL ENCOUNTER (OUTPATIENT)
Facility: AMBULATORY SURGERY CENTER | Age: 44
Discharge: HOME | End: 2024-02-01
Attending: OBSTETRICS & GYNECOLOGY | Admitting: OBSTETRICS & GYNECOLOGY
Payer: COMMERCIAL

## 2024-02-01 ENCOUNTER — ANESTHESIA (OUTPATIENT)
Dept: SURGERY | Facility: AMBULATORY SURGERY CENTER | Age: 44
End: 2024-02-01
Payer: COMMERCIAL

## 2024-02-01 VITALS
SYSTOLIC BLOOD PRESSURE: 102 MMHG | DIASTOLIC BLOOD PRESSURE: 62 MMHG | HEART RATE: 70 BPM | TEMPERATURE: 97.1 F | OXYGEN SATURATION: 100 % | RESPIRATION RATE: 16 BRPM

## 2024-02-01 DIAGNOSIS — G89.18 POST-OP PAIN: Primary | ICD-10-CM

## 2024-02-01 LAB — HCG UR QL: NEGATIVE

## 2024-02-01 PROCEDURE — G8918 PT W/O PREOP ORDER IV AB PRO: HCPCS

## 2024-02-01 PROCEDURE — 81025 URINE PREGNANCY TEST: CPT | Performed by: OBSTETRICS & GYNECOLOGY

## 2024-02-01 PROCEDURE — 58558 HYSTEROSCOPY BIOPSY: CPT

## 2024-02-01 PROCEDURE — G8907 PT DOC NO EVENTS ON DISCHARG: HCPCS

## 2024-02-01 PROCEDURE — 58558 HYSTEROSCOPY BIOPSY: CPT | Performed by: OBSTETRICS & GYNECOLOGY

## 2024-02-01 PROCEDURE — 88305 TISSUE EXAM BY PATHOLOGIST: CPT | Performed by: PATHOLOGY

## 2024-02-01 RX ORDER — OXYCODONE HYDROCHLORIDE 5 MG/1
10 TABLET ORAL
Status: DISCONTINUED | OUTPATIENT
Start: 2024-02-01 | End: 2024-02-03 | Stop reason: HOSPADM

## 2024-02-01 RX ORDER — ONDANSETRON 4 MG/1
4 TABLET, ORALLY DISINTEGRATING ORAL EVERY 30 MIN PRN
Status: DISCONTINUED | OUTPATIENT
Start: 2024-02-01 | End: 2024-02-03 | Stop reason: HOSPADM

## 2024-02-01 RX ORDER — DEXAMETHASONE SODIUM PHOSPHATE 4 MG/ML
4 INJECTION, SOLUTION INTRA-ARTICULAR; INTRALESIONAL; INTRAMUSCULAR; INTRAVENOUS; SOFT TISSUE
Status: DISCONTINUED | OUTPATIENT
Start: 2024-02-01 | End: 2024-02-03 | Stop reason: HOSPADM

## 2024-02-01 RX ORDER — FENTANYL CITRATE 50 UG/ML
50 INJECTION, SOLUTION INTRAMUSCULAR; INTRAVENOUS EVERY 5 MIN PRN
Status: DISCONTINUED | OUTPATIENT
Start: 2024-02-01 | End: 2024-02-03 | Stop reason: HOSPADM

## 2024-02-01 RX ORDER — ACETAMINOPHEN 325 MG/1
975 TABLET ORAL ONCE
Status: DISCONTINUED | OUTPATIENT
Start: 2024-02-01 | End: 2024-02-03 | Stop reason: HOSPADM

## 2024-02-01 RX ORDER — PROPOFOL 10 MG/ML
INJECTION, EMULSION INTRAVENOUS PRN
Status: DISCONTINUED | OUTPATIENT
Start: 2024-02-01 | End: 2024-02-01

## 2024-02-01 RX ORDER — LIDOCAINE 40 MG/G
CREAM TOPICAL
Status: DISCONTINUED | OUTPATIENT
Start: 2024-02-01 | End: 2024-02-03 | Stop reason: HOSPADM

## 2024-02-01 RX ORDER — IBUPROFEN 800 MG/1
800 TABLET, FILM COATED ORAL EVERY 6 HOURS PRN
Qty: 30 TABLET | Refills: 0 | Status: SHIPPED | OUTPATIENT
Start: 2024-02-01 | End: 2024-08-05

## 2024-02-01 RX ORDER — PROPOFOL 10 MG/ML
INJECTION, EMULSION INTRAVENOUS CONTINUOUS PRN
Status: DISCONTINUED | OUTPATIENT
Start: 2024-02-01 | End: 2024-02-01

## 2024-02-01 RX ORDER — ONDANSETRON 2 MG/ML
4 INJECTION INTRAMUSCULAR; INTRAVENOUS EVERY 30 MIN PRN
Status: DISCONTINUED | OUTPATIENT
Start: 2024-02-01 | End: 2024-02-03 | Stop reason: HOSPADM

## 2024-02-01 RX ORDER — IBUPROFEN 400 MG/1
800 TABLET, FILM COATED ORAL ONCE
Status: DISCONTINUED | OUTPATIENT
Start: 2024-02-01 | End: 2024-02-03 | Stop reason: HOSPADM

## 2024-02-01 RX ORDER — OXYCODONE HYDROCHLORIDE 5 MG/1
5 TABLET ORAL
Status: DISCONTINUED | OUTPATIENT
Start: 2024-02-01 | End: 2024-02-03 | Stop reason: HOSPADM

## 2024-02-01 RX ORDER — SODIUM CHLORIDE, SODIUM LACTATE, POTASSIUM CHLORIDE, CALCIUM CHLORIDE 600; 310; 30; 20 MG/100ML; MG/100ML; MG/100ML; MG/100ML
INJECTION, SOLUTION INTRAVENOUS CONTINUOUS PRN
Status: DISCONTINUED | OUTPATIENT
Start: 2024-02-01 | End: 2024-02-01

## 2024-02-01 RX ORDER — ONDANSETRON 2 MG/ML
INJECTION INTRAMUSCULAR; INTRAVENOUS PRN
Status: DISCONTINUED | OUTPATIENT
Start: 2024-02-01 | End: 2024-02-01

## 2024-02-01 RX ORDER — FENTANYL CITRATE 50 UG/ML
INJECTION, SOLUTION INTRAMUSCULAR; INTRAVENOUS PRN
Status: DISCONTINUED | OUTPATIENT
Start: 2024-02-01 | End: 2024-02-01

## 2024-02-01 RX ORDER — SODIUM CHLORIDE, SODIUM LACTATE, POTASSIUM CHLORIDE, CALCIUM CHLORIDE 600; 310; 30; 20 MG/100ML; MG/100ML; MG/100ML; MG/100ML
INJECTION, SOLUTION INTRAVENOUS CONTINUOUS
Status: DISCONTINUED | OUTPATIENT
Start: 2024-02-01 | End: 2024-02-03 | Stop reason: HOSPADM

## 2024-02-01 RX ORDER — FENTANYL CITRATE 50 UG/ML
25 INJECTION, SOLUTION INTRAMUSCULAR; INTRAVENOUS EVERY 5 MIN PRN
Status: DISCONTINUED | OUTPATIENT
Start: 2024-02-01 | End: 2024-02-03 | Stop reason: HOSPADM

## 2024-02-01 RX ORDER — ACETAMINOPHEN 325 MG/1
975 TABLET ORAL ONCE
Status: COMPLETED | OUTPATIENT
Start: 2024-02-01 | End: 2024-02-01

## 2024-02-01 RX ORDER — DEXAMETHASONE SODIUM PHOSPHATE 4 MG/ML
INJECTION, SOLUTION INTRA-ARTICULAR; INTRALESIONAL; INTRAMUSCULAR; INTRAVENOUS; SOFT TISSUE PRN
Status: DISCONTINUED | OUTPATIENT
Start: 2024-02-01 | End: 2024-02-01

## 2024-02-01 RX ORDER — FENTANYL CITRATE 50 UG/ML
25 INJECTION, SOLUTION INTRAMUSCULAR; INTRAVENOUS
Status: DISCONTINUED | OUTPATIENT
Start: 2024-02-01 | End: 2024-02-03 | Stop reason: HOSPADM

## 2024-02-01 RX ADMIN — PROPOFOL 30 MG: 10 INJECTION, EMULSION INTRAVENOUS at 14:28

## 2024-02-01 RX ADMIN — FENTANYL CITRATE 50 MCG: 50 INJECTION, SOLUTION INTRAMUSCULAR; INTRAVENOUS at 14:17

## 2024-02-01 RX ADMIN — PROPOFOL 100 MCG/KG/MIN: 10 INJECTION, EMULSION INTRAVENOUS at 14:22

## 2024-02-01 RX ADMIN — DEXAMETHASONE SODIUM PHOSPHATE 4 MG: 4 INJECTION, SOLUTION INTRA-ARTICULAR; INTRALESIONAL; INTRAMUSCULAR; INTRAVENOUS; SOFT TISSUE at 14:17

## 2024-02-01 RX ADMIN — ONDANSETRON 4 MG: 2 INJECTION INTRAMUSCULAR; INTRAVENOUS at 14:17

## 2024-02-01 RX ADMIN — SODIUM CHLORIDE, SODIUM LACTATE, POTASSIUM CHLORIDE, CALCIUM CHLORIDE: 600; 310; 30; 20 INJECTION, SOLUTION INTRAVENOUS at 14:14

## 2024-02-01 RX ADMIN — ACETAMINOPHEN 975 MG: 325 TABLET ORAL at 11:32

## 2024-02-01 RX ADMIN — PROPOFOL 50 MG: 10 INJECTION, EMULSION INTRAVENOUS at 14:17

## 2024-02-01 RX ADMIN — FENTANYL CITRATE 50 MCG: 50 INJECTION, SOLUTION INTRAMUSCULAR; INTRAVENOUS at 14:28

## 2024-02-01 NOTE — ANESTHESIA POSTPROCEDURE EVALUATION
Patient: Lorraine Huston    Procedure: Procedure(s):  HYSTEROSCOPY, WITH DILATION AND CURETTAGE OF UTERUS USING MORCELLATOR       Anesthesia Type:  MAC    Note:  Disposition: Outpatient   Postop Pain Control: Uneventful            Sign Out: Well controlled pain   PONV: No   Neuro/Psych: Uneventful            Sign Out: Acceptable/Baseline neuro status   Airway/Respiratory: Uneventful            Sign Out: Acceptable/Baseline resp. status   CV/Hemodynamics: Uneventful            Sign Out: Acceptable CV status; No obvious hypovolemia; No obvious fluid overload   Other NRE:    DID A NON-ROUTINE EVENT OCCUR?            Last vitals:  Vitals Value Taken Time   /64 02/01/24 1455   Temp 97  F (36.1  C) 02/01/24 1455   Pulse 65 02/01/24 1455   Resp 16 02/01/24 1455   SpO2         Electronically Signed By: Juventino Peterson MD  February 1, 2024  3:00 PM

## 2024-02-01 NOTE — ANESTHESIA CARE TRANSFER NOTE
Patient: Lorraine Huston    Procedure: Procedure(s):  HYSTEROSCOPY, WITH DILATION AND CURETTAGE OF UTERUS USING MORCELLATOR       Diagnosis: Abnormal uterine bleeding due to endometrial polyp [N93.9, N84.0]  Diagnosis Additional Information: No value filed.    Anesthesia Type:   MAC     Note:    Oropharynx: oropharynx clear of all foreign objects and spontaneously breathing  Level of Consciousness: awake  Oxygen Supplementation: room air    Independent Airway: airway patency satisfactory and stable  Dentition: dentition unchanged  Vital Signs Stable: post-procedure vital signs reviewed and stable  Report to RN Given: handoff report given  Patient transferred to: Phase II    Handoff Report: Identifed the Patient, Identified the Reponsible Provider, Reviewed the pertinent medical history, Discussed the surgical course, Reviewed Intra-OP anesthesia mangement and issues during anesthesia, Set expectations for post-procedure period and Allowed opportunity for questions and acknowledgement of understanding      Vitals:  Vitals Value Taken Time   BP     Temp     Pulse     Resp     SpO2         Electronically Signed By: JANNETTE Wagner CRNA  February 1, 2024  2:55 PM

## 2024-02-01 NOTE — OP NOTE
"OPERATIVE REPORT:    Date of Procedure:  2/1/2024    Preoperative Diagnosis:  Abnormal uterine bleeding   Thick endometrium on ultrasound/abnormal finding on ultrasound     Postoperative Diagnosis:  Abnormal uterine bleeding   Thick endometrium on ultrasound/abnormal finding on ultrasound     Procedures:  Hysteroscopy and dilation and curettage with MyoSure     Surgeon:  Soren Parekh MD    OR personnel:   See OR record    Anesthesia:  Monitor Anesthesia Care    Specimens:    Endometrial curettings  Endocervical curettings    Complications:   None apparent     Findings/Conclusions:   Multiple polypoid like structures within the uterine cavity     Estimated Blood Loss:  2 ml     Fluid Deficit:    150 ml     Condition on discharge from OR:  Satisfactory      Procedure in Detail:  Proper consents were obtained.  The patient and I reviewed the risks, benefits, goals and limitations.   Her questions seemed to be answered.  After consenting to the procedure, the patient was taken to the OR where she was placed into the supine position.  She was then placed under MAC anesthesia after which she was placed into the dorsal lithotomy position.  She was then prepped and draped into the usual sterile fashion.   I then performed the exam under anesthesia.  The speculum was placed and the cervix was grasped with the single tooth tenaculum.  The endocervical curettage was performed and this was sent to pathology as a separate specimen.  The uterus was sounded to 10 cm.   The cervix was then dilated to a 6 Iris.  The hysteroscope was inserted in through the cervix.  The findings are noted above, with multiple polypoid like structures seen. The MyoSure device then inserted and the polypoid like structures removed until remaining tissue was nearly flush with rest of endometrium. The uterus was deflated and re-inflated with the fluid and the MyoSure was again used to removed additional tissue.  The MyoSure was then used to \"brush\" " over the endometrium for sampling.  After multiple passes the endometrial curettage was then sent to pathology.   The uterus was deflated and the instrumentation removed.       After the fractional dilation and curettage was completed, the vaginal instruments were removed and hemostasis was noted.  The patient was then taken out of the dorsal lithotomy position, awakened, and taken to the recovery room in stable condition.    No apparent complications.  Counts were correct.     Soren Parekh MD

## 2024-02-01 NOTE — DISCHARGE INSTRUCTIONS
Tylenol 975 mg was given at 11:32 AM. You can take your next dose after 5:32 PM.    You should not take more then 4,000 mg of tylenol/acetaminophen in a 24 hour period.

## 2024-02-05 LAB
PATH REPORT.COMMENTS IMP SPEC: NORMAL
PATH REPORT.COMMENTS IMP SPEC: NORMAL
PATH REPORT.FINAL DX SPEC: NORMAL
PATH REPORT.GROSS SPEC: NORMAL
PATH REPORT.RELEVANT HX SPEC: NORMAL
PHOTO IMAGE: NORMAL

## 2024-02-16 ENCOUNTER — OFFICE VISIT (OUTPATIENT)
Dept: OBGYN | Facility: CLINIC | Age: 44
End: 2024-02-16
Payer: COMMERCIAL

## 2024-02-16 VITALS
DIASTOLIC BLOOD PRESSURE: 70 MMHG | SYSTOLIC BLOOD PRESSURE: 109 MMHG | BODY MASS INDEX: 24.01 KG/M2 | HEART RATE: 77 BPM | OXYGEN SATURATION: 99 % | WEIGHT: 127 LBS

## 2024-02-16 DIAGNOSIS — N93.9 ABNORMAL UTERINE BLEEDING (AUB): Primary | ICD-10-CM

## 2024-02-16 PROCEDURE — 99214 OFFICE O/P EST MOD 30 MIN: CPT | Performed by: OBSTETRICS & GYNECOLOGY

## 2024-02-16 NOTE — PROGRESS NOTES
Lorraine is a 43 year old , She is 2+ weeks s/p hysteroscopy and D&C with MyoSure.  Her postop recovery has been uncomplicated.  She is currently requiring no medications for pain management.  She has not had any unusual vaginal bleeding, no hot flashes. Energy level is normal.  Denies fever, chills.    The pathology report showed:   Final Diagnosis  A. ECC:  - Small fragments of endocervical tissue, negative for dysplasia  B. endometrial curettings:  -Proliferative endometrium, negative for hyperplasia or atypia    The medical history, social history and family history have been reviewed and updated as indicated.      ROS:   ROS:4 point ROS including Respiratory, CV, GI and , other than that noted in the HPI and the PMH, is negative     PE: /70 (BP Location: Right arm, Cuff Size: Adult Regular)   Pulse 77   Wt 57.6 kg (127 lb)   LMP 01/10/2024 (Approximate)   SpO2 99%   BMI 24.01 kg/m    General:  WNWD female, NAD  Alert  Oriented x 3  Gait:  Normal  Skin:  Normal skin turgor  HEENT:  NC/AT, EOMI  Abdomen:  Non-tender, non-distended.  Pelvic exam:  Not performed  Extremities:  No clubbing, no cyanosis and no edema.        ASSESSMENT:  Abnormal uterine bleeding     PLAN:  We discussed the management options and at this time she desires no interventions.  She will let me know how the bleeding is doing and schedule appointments if needed.   Questions seemed to be answered.      Total time preparing to see patient with reviewing prior encounter and labs, face to face time,  and coordinating care on the same calendar date:  35 minutes.     Soren Parekh MD

## 2024-04-05 ENCOUNTER — MYC MEDICAL ADVICE (OUTPATIENT)
Dept: OBGYN | Facility: CLINIC | Age: 44
End: 2024-04-05
Payer: COMMERCIAL

## 2024-04-05 NOTE — TELEPHONE ENCOUNTER
Pt had a hysteroscopy D&C on 2/1 for AUB.     Pt is writing in stating she has only had occasionally spotting since her D&C and no period with flow.    Pt is wondering if she needs to be seen in office and if when she finally does get a period will it be very heavy since she hasn't bled for 2 cycles.

## 2024-04-09 ENCOUNTER — OFFICE VISIT (OUTPATIENT)
Dept: URGENT CARE | Facility: URGENT CARE | Age: 44
End: 2024-04-09
Payer: COMMERCIAL

## 2024-04-09 VITALS
TEMPERATURE: 98.2 F | SYSTOLIC BLOOD PRESSURE: 105 MMHG | OXYGEN SATURATION: 99 % | HEART RATE: 74 BPM | BODY MASS INDEX: 24.42 KG/M2 | RESPIRATION RATE: 17 BRPM | WEIGHT: 129.2 LBS | DIASTOLIC BLOOD PRESSURE: 70 MMHG

## 2024-04-09 DIAGNOSIS — J45.909 ASTHMA, UNSPECIFIED ASTHMA SEVERITY, UNSPECIFIED WHETHER COMPLICATED, UNSPECIFIED WHETHER PERSISTENT: ICD-10-CM

## 2024-04-09 DIAGNOSIS — H92.01 RIGHT EAR PAIN: ICD-10-CM

## 2024-04-09 DIAGNOSIS — B00.9 HSV-1 INFECTION: ICD-10-CM

## 2024-04-09 DIAGNOSIS — J06.9 VIRAL URI WITH COUGH: Primary | ICD-10-CM

## 2024-04-09 PROCEDURE — 99213 OFFICE O/P EST LOW 20 MIN: CPT

## 2024-04-09 RX ORDER — ACYCLOVIR 400 MG/1
400 TABLET ORAL 3 TIMES DAILY
Qty: 21 TABLET | Refills: 0 | Status: SHIPPED | OUTPATIENT
Start: 2024-04-09 | End: 2024-08-05

## 2024-04-09 RX ORDER — ALBUTEROL SULFATE 90 UG/1
2 AEROSOL, METERED RESPIRATORY (INHALATION) EVERY 6 HOURS PRN
Qty: 8.5 G | Refills: 0 | Status: SHIPPED | OUTPATIENT
Start: 2024-04-09

## 2024-04-09 NOTE — PROGRESS NOTES
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  We discussed using Flonase for the right ear pain.  We also discussed that her symptoms are likely related to a viral illness.  Informed the patient to take benzonatate which she indicates she has at home and use the albuterol inhaler for her cough.  We discussed taking acyclovir for her cold sore.  Culture patient instructions discussed and provided.  We also discussed the need for her to get plenty rest, drink fluids and use Tylenol and or ibuprofen as needed for pain with the maximum dose of Tylenol being 4000 mg in a 24-hour period of time and to take ibuprofen with food to avoid upset stomach.  Informed the patient that she can try warm salt water gargles, hot/warm water or tea with honey and/or lemon and or Cepacol lozenges or spray for the sore throat.  Work note provided.  Discussed the need to return to clinic with any new or worsening symptoms.  Patient acknowledged understanding of the above plan.    The use of Dragon/View Medical dictation services may have been used to construct the content in this note; any grammatical or spelling errors are non-intentional. Please contact the author of this note directly if you are in need of any clarification.      Emanuel Coello, JANNETTE CNP      SUBJECTIVE:   Lorraine Huston is a 44 year old female presenting with a chief complaint of cough - non-productive, sore throat, ear pain right, and hoarse voice.  Onset of symptoms was 4 day(s) ago.  Course of illness is improving.    Patient denies:  fever, runny nose, vomiting, and diarrhea  Treatment measures tried include Tylenol.  Predisposing factors include teaches elementary school.    ROS:  Negative except noted above.    OBJECTIVE:  /70 (BP Location: Right arm, Cuff Size: Adult Small)   Pulse 74   Temp 98.2  F (36.8  C) (Tympanic)   Resp 17   Wt 58.6 kg (129 lb 3.2 oz)   SpO2 99%   BMI 24.42 kg/m    GENERAL APPEARANCE: healthy, alert and no distress  EYES: EOMI,  PERRL, conjunctiva clear  HENT: ear canals and TM's normal.  Nose and mouth without ulcers, erythema or lesions  NECK: supple, nontender, no lymphadenopathy  RESP: lungs clear to auscultation - no rales, rhonchi or wheezes  CV: regular rates and rhythm, normal S1 S2, no murmur noted  SKIN: Erythematous vesicle with crust on right side of upper lip

## 2024-04-09 NOTE — LETTER
April 9, 2024      Lorraine Huston  [id number] [facility] [address]        To Whom It May Concern:    Lorraine Huston  was seen on April 9, 2024.  Please excuse her from work until April 11th due to illness.        Sincerely,        Emanuel Coello, JANNETTE CNP

## 2024-04-09 NOTE — PATIENT INSTRUCTIONS
Ears do not show any signs of ear infection in the clinic today.  Use Flonase for the right ear pain.  Your symptoms are likely related to a viral illness.  Take the benzonatate and use the albuterol inhaler for your cough.  Take the acyclovir for the cold sore. Get plenty of rest and drink fluids.  Can use Tylenol and/or ibuprofen as needed for pain.  Maximum dose of Tylenol is 4000mg in a 24 hour period of time.  Take ibuprofen with food to avoid stomach upset.  You can also try warm salt water gargles, hot/warm water or tea with honey and/or lemon and/or Cepacol lozenges or spray for your sore throat.

## 2024-04-17 ENCOUNTER — OFFICE VISIT (OUTPATIENT)
Dept: OBGYN | Facility: CLINIC | Age: 44
End: 2024-04-17
Payer: COMMERCIAL

## 2024-04-17 VITALS
BODY MASS INDEX: 24.54 KG/M2 | SYSTOLIC BLOOD PRESSURE: 106 MMHG | DIASTOLIC BLOOD PRESSURE: 72 MMHG | WEIGHT: 129.8 LBS | HEART RATE: 74 BPM | OXYGEN SATURATION: 98 %

## 2024-04-17 DIAGNOSIS — E03.9 HYPOTHYROIDISM, UNSPECIFIED TYPE: ICD-10-CM

## 2024-04-17 DIAGNOSIS — N93.9 ABNORMAL UTERINE BLEEDING (AUB): Primary | ICD-10-CM

## 2024-04-17 PROCEDURE — 99214 OFFICE O/P EST MOD 30 MIN: CPT | Performed by: OBSTETRICS & GYNECOLOGY

## 2024-04-17 NOTE — PROGRESS NOTES
Lorraine is a 44 year old  (set  of twins) she presents today with absent menses/abnormal uterine bleeding.    She had the MyoSure hysteroscopy and D&C on 2024. The pathology is reviewed with her.    She had some spotting the month after the procedure and it lasted about 3 days.   She then had spotting again the last week of March and she had 8 days of spotting.  She felt that this was like the beginning of the menses, but she states it never came.    She has not had any heavier bleeding.      Past Medical History:   Diagnosis Date    Family history of diabetes mellitus     Infertility     clomid pregnancy    Uncomplicated asthma     Under controlled. Asthma only when having a very bad cough       Past Surgical History:   Procedure Laterality Date    ABDOMEN SURGERY  09    C section (twin babies)    C/SECTION, LOW TRANSVERSE  2009    , Low Transverse    DILATION AND CURETTAGE, OPERATIVE HYSTEROSCOPY WITH MORCELLATOR, COMBINED N/A 2024    DILATION AND CURETTAGE, OPERATIVE HYSTEROSCOPY WITH MORCELLATOR, COMBINED N/A 2024    Procedure: HYSTEROSCOPY, WITH DILATION AND CURETTAGE OF UTERUS USING MORCELLATOR;  Surgeon: Soren Parekh MD;  Location: [facility] OR       OB History    Para Term  AB Living   2 2 0 2 0 2   SAB IAB Ectopic Multiple Live Births   0 0 0 1 1      # Outcome Date GA Lbr Connor/2nd Weight Sex Type Anes PTL Lv   2  09 36w5d  2.325 kg (5 lb 2 oz) M CS-Unspec   SY      Birth Comments: Twin B      Name: Kylah RAMIREZ Robel      Apgar1: 8  Apgar5: 8   1  09 36w5d  2.045 kg (4 lb 8.1 oz) F CS-Unspec   SY      Birth Comments: Twin A      Name: Alysia SCHILLING Robel      Apgar1: 8  Apgar5: 9       Gynecological History            Patient's last menstrual period was 2024.     No STD/No PID/no IUD     See above HPI         Allergies   Allergen Reactions    Hydrocodone     Latex      From allergy test    Methylisothiazolinone       From allergy testing    Thimerosal      From allergy testing       Current Outpatient Medications   Medication Sig Dispense Refill    albuterol (PROAIR HFA/PROVENTIL HFA/VENTOLIN HFA) 108 (90 Base) MCG/ACT inhaler Inhale 2 puffs into the lungs every 6 hours as needed for cough 8.5 g 0    ascorbic acid (VITAMIN C) 250 MG CHEW chewable tablet Take 250 mg by mouth daily      ferrous sulfate (FEROSUL) 325 (65 Fe) MG tablet Take 325 mg by mouth daily (with breakfast)      fish oil-omega-3 fatty acids 500 MG capsule 500 capsules daily      Multiple Vitamins-Minerals (MULTIVITAMIN & MINERAL PO)       vitamin D3 (CHOLECALCIFEROL) 50 mcg (2000 units) tablet Take 1 tablet by mouth daily      acyclovir (ZOVIRAX) 400 MG tablet Take 1 tablet (400 mg) by mouth 3 times daily for 7 days 21 tablet 0    ibuprofen (ADVIL/MOTRIN) 800 MG tablet Take 1 tablet (800 mg) by mouth every 6 hours as needed for other (mild and/or inflammatory pain) (Patient not taking: Reported on 2/16/2024) 30 tablet 0       Social History     Socioeconomic History    Marital status:      Spouse name: Not on file    Number of children: Not on file    Years of education: Not on file    Highest education level: Not on file   Occupational History    Occupation: Paraprofessional   Tobacco Use    Smoking status: Never    Smokeless tobacco: Never   Vaping Use    Vaping status: Never Used   Substance and Sexual Activity    Alcohol use: No    Drug use: No    Sexual activity: Yes     Partners: Male     Birth control/protection: Male Surgical     Comment: vasectomy   Other Topics Concern     Service No    Blood Transfusions No    Caffeine Concern No    Occupational Exposure No    Hobby Hazards Yes     Comment: down hill ski    Sleep Concern No    Stress Concern No    Weight Concern No    Special Diet Yes     Comment: eat healthy (not a milk drinker drink calcium OJ    Back Care Yes     Comment: Left upper and Left shoulder    Exercise Yes    Bike Helmet  Not Asked     Comment: N/A    Seat Belt Yes    Self-Exams Yes    Parent/sibling w/ CABG, MI or angioplasty before 65F 55M? No   Social History Narrative    November 8, 2018    ENVIRONMENTAL HISTORY: The family [place] The home is heated with a forced air. They do have central air conditioning. The patient's bedroom is furnished with carpeting in bedroom. No pets inside the house. There is no history of cockroach or mice infestation. There are no smokers in the house.  The house does not have a damp basement.      Social Determinants of Health     Financial Resource Strain: Low Risk  (1/15/2024)    Financial Resource Strain     Within the past 12 months, have you or your family members you live with been unable to get utilities (heat, electricity) when it was really needed?: No   Food Insecurity: Low Risk  (1/15/2024)    Food Insecurity     Within the past 12 months, did you worry that your food would run out before you got money to buy more?: No     Within the past 12 months, did the food you bought just not last and you didn t have money to get more?: No   Transportation Needs: Low Risk  (1/15/2024)    Transportation Needs     Within the past 12 months, has lack of transportation kept you from medical appointments, getting your medicines, non-medical meetings or appointments, work, or from getting things that you need?: No   Physical Activity: Not on file   Stress: Not on file   Social Connections: Not on file   Interpersonal Safety: Not on file   Housing Stability: Low Risk  (1/15/2024)    Housing Stability     Do you have housing? : Yes     Are you worried about losing your housing?: No       Family History   Problem Relation Age of Onset    Diabetes Maternal Grandmother     Heart Disease Father     Hypertension Father     Diabetes Father     Cancer Maternal Grandfather         lung    Deep Vein Thrombosis Paternal Grandmother     Hypertension Paternal Grandmother      Asthma Brother          Review of Systems:  10 point ROS of systems including Constitutional, Eyes, Respiratory, Cardiovascular, Gastroenterology, Genitourinary, Integumentary, Muscularskeletal, Psychiatric were all negative except for pertinent positives noted in my HPI and in the PMH.          EXAM:  /72 (BP Location: Right arm, Cuff Size: Adult Regular)   Pulse 74   Wt 58.9 kg (129 lb 12.8 oz)   LMP 02/01/2024   SpO2 98%   BMI 24.54 kg/m    Body mass index is 24.54 kg/m .  General:  WNWD female, NAD  Alert  Oriented x 3  Gait:  Normal  Skin:  Normal skin turgor  HEENT:  NC/AT, EOMI  Abdomen:  Non-tender, non-distended.  Pelvic exam:  Not performed  Extremities:  No clubbing, no cyanosis and no edema.      ASSESSMENT:  Abnormal uterine bleeding   Hashimoto's Thyroiditis       PLAN:  We discussed the currently bleeding and I would consider the spotting to be a cycle.  We discussed the D&C is not a good treatment for bleeding as the cycles usually go back to what they were previously, within a few cycles.  I suspect her cycles will become closer to her customary amount of bleeding within a few months, perhaps sooner.    At this time I am attempting to reassure her about the spotting, as it seems to be closer to her regular menses cycles.    Questions seem to be answered.     Total time preparing to see patient with reviewing prior encounter and labs, face to face time,  and coordinating care on the same calendar date:   36 minutes.     Soren Parekh MD

## 2024-08-05 ENCOUNTER — OFFICE VISIT (OUTPATIENT)
Dept: FAMILY MEDICINE | Facility: CLINIC | Age: 44
End: 2024-08-05
Payer: COMMERCIAL

## 2024-08-05 VITALS
OXYGEN SATURATION: 96 % | RESPIRATION RATE: 16 BRPM | WEIGHT: 129 LBS | DIASTOLIC BLOOD PRESSURE: 78 MMHG | BODY MASS INDEX: 24.35 KG/M2 | TEMPERATURE: 98 F | HEART RATE: 69 BPM | SYSTOLIC BLOOD PRESSURE: 115 MMHG | HEIGHT: 61 IN

## 2024-08-05 DIAGNOSIS — Z23 ENCOUNTER FOR IMMUNIZATION: ICD-10-CM

## 2024-08-05 DIAGNOSIS — E78.5 HYPERLIPIDEMIA LDL GOAL <130: ICD-10-CM

## 2024-08-05 DIAGNOSIS — Z00.00 ROUTINE GENERAL MEDICAL EXAMINATION AT A HEALTH CARE FACILITY: Primary | ICD-10-CM

## 2024-08-05 DIAGNOSIS — J45.20 MILD INTERMITTENT ASTHMA WITHOUT COMPLICATION: ICD-10-CM

## 2024-08-05 LAB
ERYTHROCYTE [DISTWIDTH] IN BLOOD BY AUTOMATED COUNT: 12 % (ref 10–15)
HCT VFR BLD AUTO: 41 % (ref 35–47)
HGB BLD-MCNC: 13.9 G/DL (ref 11.7–15.7)
MCH RBC QN AUTO: 30.5 PG (ref 26.5–33)
MCHC RBC AUTO-ENTMCNC: 33.9 G/DL (ref 31.5–36.5)
MCV RBC AUTO: 90 FL (ref 78–100)
PLATELET # BLD AUTO: 225 10E3/UL (ref 150–450)
RBC # BLD AUTO: 4.55 10E6/UL (ref 3.8–5.2)
WBC # BLD AUTO: 4.8 10E3/UL (ref 4–11)

## 2024-08-05 PROCEDURE — 80061 LIPID PANEL: CPT | Performed by: FAMILY MEDICINE

## 2024-08-05 PROCEDURE — 82947 ASSAY GLUCOSE BLOOD QUANT: CPT | Performed by: FAMILY MEDICINE

## 2024-08-05 PROCEDURE — 90677 PCV20 VACCINE IM: CPT | Performed by: FAMILY MEDICINE

## 2024-08-05 PROCEDURE — 84443 ASSAY THYROID STIM HORMONE: CPT | Performed by: FAMILY MEDICINE

## 2024-08-05 PROCEDURE — 99396 PREV VISIT EST AGE 40-64: CPT | Mod: 25 | Performed by: FAMILY MEDICINE

## 2024-08-05 PROCEDURE — 36415 COLL VENOUS BLD VENIPUNCTURE: CPT | Performed by: FAMILY MEDICINE

## 2024-08-05 PROCEDURE — 86706 HEP B SURFACE ANTIBODY: CPT | Performed by: FAMILY MEDICINE

## 2024-08-05 PROCEDURE — 85027 COMPLETE CBC AUTOMATED: CPT | Performed by: FAMILY MEDICINE

## 2024-08-05 PROCEDURE — 90471 IMMUNIZATION ADMIN: CPT | Performed by: FAMILY MEDICINE

## 2024-08-05 SDOH — HEALTH STABILITY: PHYSICAL HEALTH: ON AVERAGE, HOW MANY DAYS PER WEEK DO YOU ENGAGE IN MODERATE TO STRENUOUS EXERCISE (LIKE A BRISK WALK)?: 4 DAYS

## 2024-08-05 SDOH — HEALTH STABILITY: PHYSICAL HEALTH: ON AVERAGE, HOW MANY MINUTES DO YOU ENGAGE IN EXERCISE AT THIS LEVEL?: 50 MIN

## 2024-08-05 ASSESSMENT — ASTHMA QUESTIONNAIRES
QUESTION_4 LAST FOUR WEEKS HOW OFTEN HAVE YOU USED YOUR RESCUE INHALER OR NEBULIZER MEDICATION (SUCH AS ALBUTEROL): NOT AT ALL
QUESTION_2 LAST FOUR WEEKS HOW OFTEN HAVE YOU HAD SHORTNESS OF BREATH: NOT AT ALL
QUESTION_3 LAST FOUR WEEKS HOW OFTEN DID YOUR ASTHMA SYMPTOMS (WHEEZING, COUGHING, SHORTNESS OF BREATH, CHEST TIGHTNESS OR PAIN) WAKE YOU UP AT NIGHT OR EARLIER THAN USUAL IN THE MORNING: NOT AT ALL
QUESTION_5 LAST FOUR WEEKS HOW WOULD YOU RATE YOUR ASTHMA CONTROL: COMPLETELY CONTROLLED
ACT_TOTALSCORE: 25
QUESTION_1 LAST FOUR WEEKS HOW MUCH OF THE TIME DID YOUR ASTHMA KEEP YOU FROM GETTING AS MUCH DONE AT WORK, SCHOOL OR AT HOME: NONE OF THE TIME
ACT_TOTALSCORE: 25

## 2024-08-05 ASSESSMENT — PAIN SCALES - GENERAL: PAINLEVEL: NO PAIN (0)

## 2024-08-05 ASSESSMENT — SOCIAL DETERMINANTS OF HEALTH (SDOH): HOW OFTEN DO YOU GET TOGETHER WITH FRIENDS OR RELATIVES?: ONCE A WEEK

## 2024-08-05 NOTE — LETTER
My Asthma Action Plan    Name: Lorraine Huston   YOB: 1980  Date: 8/5/2024   My doctor: Lindsay Ge MD   My clinic: [facility]        My Rescue Medicine:   Albuterol inhaler (Proair/Ventolin/Proventil HFA)  2-4 puffs EVERY 4 HOURS as needed. Use a spacer if recommended by your provider.   My Asthma Severity:   Intermittent / Exercise Induced  Know your asthma triggers: upper respiratory infections             GREEN ZONE   Good Control  I feel good  No cough or wheeze  Can work, sleep and play without asthma symptoms       Take your asthma control medicine every day.     If exercise triggers your asthma, take your rescue medication  15 minutes before exercise or sports, and  During exercise if you have asthma symptoms  Spacer to use with inhaler: If you have a spacer, make sure to use it with your inhaler             YELLOW ZONE Getting Worse  I have ANY of these:  I do not feel good  Cough or wheeze  Chest feels tight  Wake up at night   Keep taking your Green Zone medications  Start taking your rescue medicine:  every 20 minutes for up to 1 hour. Then every 4 hours for 24-48 hours.  If you stay in the Yellow Zone for more than 12-24 hours, contact your doctor.  If you do not return to the Green Zone in 12-24 hours or you get worse, start taking your oral steroid medicine if prescribed by your provider.           RED ZONE Medical Alert - Get Help  I have ANY of these:  I feel awful  Medicine is not helping  Breathing getting harder  Trouble walking or talking  Nose opens wide to breathe       Take your rescue medicine NOW  If your provider has prescribed an oral steroid medicine, start taking it NOW  Call your doctor NOW  If you are still in the Red Zone after 20 minutes and you have not reached your doctor:  Take your rescue medicine again and  Call 911 or go to the emergency room right away    See your regular doctor within 2 weeks of an Emergency Room or Urgent Care visit for  follow-up treatment.          Annual Reminders:  Meet with Asthma Educator,  Flu Shot in the Fall, consider Pneumonia Vaccination for patients with asthma (aged 19 and older).    Pharmacy[facility]    [id number] - [place], [place] - [address]  [facility] [id number] [place] [facility] - [place], [place] [address]  [id number] - [place], [place] - [address] AT [facility]    Electronically signed by Lindsay eG MD   Date: 08/05/24                    Asthma Triggers  How To Control Things That Make Your Asthma Worse    Triggers are things that make your asthma worse.  Look at the list below to help you find your triggers and   what you can do about them. You can help prevent asthma flare-ups by staying away from your triggers.      Trigger                                                          What you can do   Cigarette Smoke  Tobacco smoke can make asthma worse. Do not allow smoking in your home, car or around you.  Be sure no one smokes at a child s day care or school.  If you smoke, ask your health care provider for ways to help you quit.  Ask family members to quit too.  Ask your health care provider for a referral to Quit Plan to help you quit smoking, or call 6-908-767-PLAN.     Colds, Flu, Bronchitis  These are common triggers of asthma. Wash your hands often.  Don t touch your eyes, nose or mouth.  Get a flu shot every year.     Dust Mites  These are tiny bugs that live in cloth or carpet. They are too small to see. Wash sheets and blankets in hot water every week.   Encase pillows and mattress in dust mite proof covers.  Avoid having carpet if you can. If you have carpet, vacuum weekly.   Use a dust mask and HEPA vacuum.   Pollen and Outdoor Mold  Some people are allergic to trees, grass, or weed pollen, or molds. Try to keep your windows closed.  Limit time out doors when pollen count is high.   Ask  you health care provider about taking medicine during allergy season.     Animal Dander  Some people are allergic to skin flakes, urine or saliva from pets with fur or feathers. Keep pets with fur or feathers out of your home.    If you can t keep the pet outdoors, then keep the pet out of your bedroom.  Keep the bedroom door closed.  Keep pets off cloth furniture and away from stuffed toys.     Mice, Rats, and Cockroaches  Some people are allergic to the waste from these pests.   Cover food and garbage.  Clean up spills and food crumbs.  Store grease in the refrigerator.   Keep food out of the bedroom.   Indoor Mold  This can be a trigger if your home has high moisture. Fix leaking faucets, pipes, or other sources of water.   Clean moldy surfaces.  Dehumidify basement if it is damp and smelly.   Smoke, Strong Odors, and Sprays  These can reduce air quality. Stay away from strong odors and sprays, such as perfume, powder, hair spray, paints, smoke incense, paint, cleaning products, candles and new carpet.   Exercise or Sports  Some people with asthma have this trigger. Be active!  Ask your doctor about taking medicine before sports or exercise to prevent symptoms.    Warm up for 5-10 minutes before and after sports or exercise.     Other Triggers of Asthma  Cold air:  Cover your nose and mouth with a scarf.  Sometimes laughing or crying can be a trigger.  Some medicines and food can trigger asthma.

## 2024-08-05 NOTE — PROGRESS NOTES
Preventive Care Visit  [facility] ANJU Ge MD, Family Medicine  Aug 5, 2024      Assessment & Plan     Routine general medical examination at a health care facility    - CBC with platelets; Future  - TSH with free T4 reflex; Future    Hyperlipidemia LDL goal <130  Stable   - Lipid panel reflex to direct LDL Fasting; Future  - Glucose; Future    Encounter for immunization  Will check  Pt thinks she may have had Hep B shots   - Hepatitis B Surface Antibody; Future    Mild intermittent asthma without complication  Stable             Counseling  Appropriate preventive services were addressed with this patient via screening, questionnaire, or discussion as appropriate for fall prevention, nutrition, physical activity, Tobacco-use cessation, weight loss and cognition.  Checklist reviewing preventive services available has been given to the patient.  Reviewed patient's diet, addressing concerns and/or questions.           Carroll Peters is a 44 year old, presenting for the following:  Physical        8/5/2024     2:40 PM   Additional Questions   Roomed by Shanika        [facility] Care Directive  Patient does not have a Health Care Directive or Living Will: Discussed advance care planning with patient; information given to patient to review.    HPI  Pt here fora Physical/medicines check      Asthma Follow-Up    Was ACT completed today?  Yes        8/5/2024     2:40 PM   ACT Total Scores   ACT TOTAL SCORE (Goal Greater than or Equal to 20) 25   In the past 12 months, how many times did you visit the emergency room for your asthma without being admitted to the hospital? 0   In the past 12 months, how many times were you hospitalized overnight because of your asthma? 0      Her periods are Regular  She sees GYN for GYN issues  How many days per week do you miss taking your asthma controller medication?  I do not have an asthma controller medication  Please describe any recent triggers for your asthma:  upper respiratory infections  Have you had any Emergency Room Visits, Urgent Care Visits, or Hospital Admissions since your last office visit?  No      8/5/2024   General Health   How would you rate your overall physical health? Good   Feel stress (tense, anxious, or unable to sleep) Not at all            8/5/2024   Nutrition   Three or more servings of calcium each day? Yes   Diet: Gluten-free/reduced   How many servings of fruit and vegetables per day? 4 or more   How many sweetened beverages each day? 0-1            8/5/2024   Exercise   Days per week of moderate/strenous exercise 4 days   Average minutes spent exercising at this level 50 min            8/5/2024   Social Factors   Frequency of gathering with friends or relatives Once a week   Worry food won't last until get money to buy more No   Food not last or not have enough money for food? No   Do you have housing? (Housing is defined as stable permanent housing and does not include staying ouside in a car, in a tent, in an abandoned building, in an overnight shelter, or couch-surfing.) No   Are you worried about losing your housing? No   Lack of transportation? No   Unable to get utilities (heat,electricity)? No   Want help with housing or utility concern? No      (!) HOUSING CONCERN PRESENT      8/5/2024   Dental   Dentist two times every year? Yes            8/5/2024   TB Screening   Were you born outside of the [place]? No              Today's PHQ-2 Score:       1/15/2024     3:24 PM   PHQ-2 ( 1999 [employer])   Q1: Little interest or pleasure in doing things 0   Q2: Feeling down, depressed or hopeless 0   PHQ-2 Score 0    0   Q1: Little interest or pleasure in doing things Not at all   Q2: Feeling down, depressed or hopeless Not at all   PHQ-2 Score 0         8/5/2024   Substance Use   Alcohol more than 3/day or more than 7/wk Not Applicable   Do you use any other substances recreationally? No        Social History     Tobacco Use    Smoking status: Never     Smokeless tobacco: Never   Vaping Use    Vaping status: Never Used   Substance Use Topics    Alcohol use: No    Drug use: No           10/19/2023   LAST FHS-7 RESULTS   1st degree relative breast or ovarian cancer No   Any relative bilateral breast cancer No   Any male have breast cancer No   Any ONE woman have BOTH breast AND ovarian cancer No   Any woman with breast cancer before 50yrs No   2 or more relatives with breast AND/OR ovarian cancer No   2 or more relatives with breast AND/OR bowel cancer No           Pt is UTD with mammogram        8/5/2024   STI Screening   New sexual partner(s) since last STI/HIV test? No        History of abnormal Pap smear: sees GYN -see Notes in epic         Latest Ref Rng & Units 10/19/2023    11:58 AM 1/10/2019     3:22 PM 1/10/2019     3:15 PM   PAP / HPV   PAP  Negative for Intraepithelial Lesion or Malignancy (NILM)      PAP (Historical)   NIL     HPV 16 DNA Negative Negative   Negative    HPV 18 DNA Negative Negative   Negative    Other HR HPV Negative Negative   Negative      ASCVD Risk   The 10-year ASCVD risk score (Heidi WEST, et al., 2019) is: 0.5%    Values used to calculate the score:      Age: 44 years      Sex: Female      Is Non- : No      Diabetic: No      Tobacco smoker: No      Systolic Blood Pressure: 115 mmHg      Is BP treated: No      HDL Cholesterol: 71 mg/dL      Total Cholesterol: 236 mg/dL        8/5/2024   Contraception/Family Planning   Questions about contraception or family planning No           Reviewed and updated as needed this visit by Provider                    Past Medical History:   Diagnosis Date    Family history of diabetes mellitus     Hashimoto's thyroiditis     Infertility     clomid pregnancy    Uncomplicated asthma     Under controlled. Asthma only when having a very bad cough     Past Surgical History:   Procedure Laterality Date    ABDOMEN SURGERY  11/19/09    C section (twin babies)    C/SECTION, LOW  TRANSVERSE  2009    , Low Transverse    DILATION AND CURETTAGE, OPERATIVE HYSTEROSCOPY WITH MORCELLATOR, COMBINED N/A 2024    DILATION AND CURETTAGE, OPERATIVE HYSTEROSCOPY WITH MORCELLATOR, COMBINED N/A 2024    Procedure: HYSTEROSCOPY, WITH DILATION AND CURETTAGE OF UTERUS USING MORCELLATOR;  Surgeon: Soren Parekh MD;  Location: [facility] OR     OB History    Para Term  AB Living   2 2 0 2 0 2   SAB IAB Ectopic Multiple Live Births   0 0 0 1 2      # Outcome Date GA Lbr Connor/2nd Weight Sex Type Anes PTL Lv   2  09 36w5d  2.325 kg (5 lb 2 oz) M CS-Unspec   SY      Birth Comments: Twin B      Name: Kylah RAMIREZ Robel      Apgar1: 8  Apgar5: 8   1  09 36w5d  2.045 kg (4 lb 8.1 oz) F CS-Unspec   SY      Birth Comments: Twin A      Name: Alysia SCHILLING Robel      Apgar1: 8  Apgar5: 9     Lab work is in process  Labs reviewed in EPIC  BP Readings from Last 3 Encounters:   24 115/78   24 106/72   24 105/70    Wt Readings from Last 3 Encounters:   24 58.5 kg (129 lb)   24 58.9 kg (129 lb 12.8 oz)   24 58.6 kg (129 lb 3.2 oz)                  Patient Active Problem List   Diagnosis    Hyperlipidemia LDL goal <130    ASCUS of cervix with negative high risk HPV    Abnormal uterine bleeding due to endometrial polyp     Past Surgical History:   Procedure Laterality Date    ABDOMEN SURGERY  09    C section (twin babies)    C/SECTION, LOW TRANSVERSE  2009    , Low Transverse    DILATION AND CURETTAGE, OPERATIVE HYSTEROSCOPY WITH MORCELLATOR, COMBINED N/A 2024    DILATION AND CURETTAGE, OPERATIVE HYSTEROSCOPY WITH MORCELLATOR, COMBINED N/A 2024    Procedure: HYSTEROSCOPY, WITH DILATION AND CURETTAGE OF UTERUS USING MORCELLATOR;  Surgeon: Soren Parekh MD;  Location:  OR       Social History     Tobacco Use    Smoking status: Never    Smokeless tobacco: Never   Substance Use Topics     Alcohol use: No     Family History   Problem Relation Age of Onset    Diabetes Maternal Grandmother     Heart Disease Father     Hypertension Father     Diabetes Father     Cancer Maternal Grandfather         lung    Deep Vein Thrombosis Paternal Grandmother     Hypertension Paternal Grandmother     Asthma Brother          Current Outpatient Medications   Medication Sig Dispense Refill    albuterol (PROAIR HFA/PROVENTIL HFA/VENTOLIN HFA) 108 (90 Base) MCG/ACT inhaler Inhale 2 puffs into the lungs every 6 hours as needed for cough 8.5 g 0    ascorbic acid (VITAMIN C) 250 MG CHEW chewable tablet Take 250 mg by mouth daily      ferrous sulfate (FEROSUL) 325 (65 Fe) MG tablet Take 325 mg by mouth daily (with breakfast)      fish oil-omega-3 fatty acids 500 MG capsule 500 capsules daily      Multiple Vitamins-Minerals (MULTIVITAMIN & MINERAL PO)       vitamin D3 (CHOLECALCIFEROL) 50 mcg (2000 units) tablet Take 1 tablet by mouth daily       Allergies   Allergen Reactions    Hydrocodone     Latex      From allergy test    Methylisothiazolinone      From allergy testing    Thimerosal      From allergy testing     Recent Labs   Lab Test 12/12/23  1442 08/16/23  0944 03/27/23  1152 08/25/22  0734 02/28/22  1632 07/23/21  0946   LDL  --  127*  --  149*  --  129*   HDL  --  71  --  58  --  78   TRIG  --  189*  --  138  --  120   ALT  --  24  --   --   --  24   CR  --  0.61  --   --   --  0.65   GFRESTIMATED  --  >90  --   --   --  >90   POTASSIUM  --  3.9  --   --   --  3.7   TSH 3.16 2.96   < > 2.94   < >  --     < > = values in this interval not displayed.          Review of Systems  CONSTITUTIONAL: NEGATIVE for fever, chills, change in weight  INTEGUMENTARY/SKIN: NEGATIVE for worrisome rashes, moles or lesions  EYES: NEGATIVE for vision changes or irritation  ENT/MOUTH: NEGATIVE for ear, mouth and throat problems  RESP: NEGATIVE for significant cough or SOB  BREAST: NEGATIVE for masses, tenderness or discharge  CV: NEGATIVE  "for chest pain, palpitations or peripheral edema  GI: NEGATIVE for nausea, abdominal pain, heartburn, or change in bowel habits  : NEGATIVE for frequency, dysuria, or hematuria  MUSCULOSKELETAL: NEGATIVE for significant arthralgias or myalgia  NEURO: NEGATIVE for weakness, dizziness or paresthesias  ENDOCRINE: NEGATIVE for temperature intolerance, skin/hair changes  HEME: NEGATIVE for bleeding problems  PSYCHIATRIC: NEGATIVE for changes in mood or affect     Objective    Exam  /78   Pulse 69   Temp 98  F (36.7  C) (Temporal)   Resp 16   Ht 1.549 m (5' 0.98\")   Wt 58.5 kg (129 lb)   LMP 07/11/2024   SpO2 96%   BMI 24.39 kg/m     Estimated body mass index is 24.39 kg/m  as calculated from the following:    Height as of this encounter: 1.549 m (5' 0.98\").    Weight as of this encounter: 58.5 kg (129 lb).    Physical Exam  GENERAL: alert and no distress  EYES: Eyes grossly normal to inspection, PERRL and conjunctivae and sclerae normal  HENT: ear canals and TM's normal, nose and mouth without ulcers or lesions  NECK: no adenopathy, no asymmetry, masses, or scars  RESP: lungs clear to auscultation - no rales, rhonchi or wheezes  BREAST: normal without masses, tenderness or nipple discharge and no palpable axillary masses or adenopathy  CV: regular rate and rhythm, normal S1 S2, no S3 or S4, no murmur, click or rub, no peripheral edema  ABDOMEN: soft, nontender, no hepatosplenomegaly, no masses and bowel sounds normal  MS: no gross musculoskeletal defects noted, no edema  SKIN: no suspicious lesions or rashes  NEURO: Normal strength and tone, mentation intact and speech normal  PSYCH: mentation appears normal, affect normal/bright        Signed Electronically by: Lindsay Ge MD    "

## 2024-08-05 NOTE — PATIENT INSTRUCTIONS
Patient Education   Preventive Care Advice   This is general advice given by our system to help you stay healthy. However, your care team may have specific advice just for you. Please talk to your care team about your preventive care needs.  Nutrition  Eat 5 or more servings of fruits and vegetables each day.  Try wheat bread, brown rice and whole grain pasta (instead of white bread, rice, and pasta).  Get enough calcium and vitamin D. Check the label on foods and aim for 100% of the RDA (recommended daily allowance).  Lifestyle  Exercise at least 150 minutes each week  (30 minutes a day, 5 days a week).  Do muscle strengthening activities 2 days a week. These help control your weight and prevent disease.  No smoking.  Wear sunscreen to prevent skin cancer.  Have a dental exam and cleaning every 6 months.  Yearly exams  See your health care team every year to talk about:  Any changes in your health.  Any medicines your care team has prescribed.  Preventive care, family planning, and ways to prevent chronic diseases.  Shots (vaccines)   HPV shots (up to age 26), if you've never had them before.  Hepatitis B shots (up to age 59), if you've never had them before.  COVID-19 shot: Get this shot when it's due.  Flu shot: Get a flu shot every year.  Tetanus shot: Get a tetanus shot every 10 years.  Pneumococcal, hepatitis A, and RSV shots: Ask your care team if you need these based on your risk.  Shingles shot (for age 50 and up)  General health tests  Diabetes screening:  Starting at age 35, Get screened for diabetes at least every 3 years.  If you are younger than age 35, ask your care team if you should be screened for diabetes.  Cholesterol test: At age 39, start having a cholesterol test every 5 years, or more often if advised.  Bone density scan (DEXA): At age 50, ask your care team if you should have this scan for osteoporosis (brittle bones).  Hepatitis C: Get tested at least once in your life.  STIs (sexually  transmitted infections)  Before age 24: Ask your care team if you should be screened for STIs.  After age 24: Get screened for STIs if you're at risk. You are at risk for STIs (including HIV) if:  You are sexually active with more than one person.  You don't use condoms every time.  You or a partner was diagnosed with a sexually transmitted infection.  If you are at risk for HIV, ask about PrEP medicine to prevent HIV.  Get tested for HIV at least once in your life, whether you are at risk for HIV or not.  Cancer screening tests  Cervical cancer screening: If you have a cervix, begin getting regular cervical cancer screening tests starting at age 21.  Breast cancer scan (mammogram): If you've ever had breasts, begin having regular mammograms starting at age 40. This is a scan to check for breast cancer.  Colon cancer screening: It is important to start screening for colon cancer at age 45.  Have a colonoscopy test every 10 years (or more often if you're at risk) Or, ask your provider about stool tests like a FIT test every year or Cologuard test every 3 years.  To learn more about your testing options, visit:   .  For help making a decision, visit:   https://bit.ly/vb70311.  Prostate cancer screening test: If you have a prostate, ask your care team if a prostate cancer screening test (PSA) at age 55 is right for you.  Lung cancer screening: If you are a current or former smoker ages 50 to 80, ask your care team if ongoing lung cancer screenings are right for you.  For informational purposes only. Not to replace the advice of your health care provider. Copyright   2023 [employer]. All rights reserved. Clinically reviewed by the [facility] Transitions Program. [employer] [id number] - REV 01/24.

## 2024-08-06 LAB
CHOLEST SERPL-MCNC: 235 MG/DL
FASTING STATUS PATIENT QL REPORTED: NO
FASTING STATUS PATIENT QL REPORTED: NO
GLUCOSE SERPL-MCNC: 103 MG/DL (ref 70–99)
HBV SURFACE AB SERPL IA-ACNC: >1000 M[IU]/ML
HBV SURFACE AB SERPL IA-ACNC: REACTIVE M[IU]/ML
HDLC SERPL-MCNC: 60 MG/DL
LDLC SERPL CALC-MCNC: 123 MG/DL
NONHDLC SERPL-MCNC: 175 MG/DL
TRIGL SERPL-MCNC: 258 MG/DL
TSH SERPL DL<=0.005 MIU/L-ACNC: 2.38 UIU/ML (ref 0.3–4.2)

## 2024-08-13 ENCOUNTER — MYC MEDICAL ADVICE (OUTPATIENT)
Dept: FAMILY MEDICINE | Facility: CLINIC | Age: 44
End: 2024-08-13
Payer: COMMERCIAL

## 2024-11-20 ENCOUNTER — OFFICE VISIT (OUTPATIENT)
Dept: OBGYN | Facility: CLINIC | Age: 44
End: 2024-11-20
Payer: COMMERCIAL

## 2024-11-20 VITALS
WEIGHT: 125.2 LBS | HEART RATE: 65 BPM | SYSTOLIC BLOOD PRESSURE: 106 MMHG | DIASTOLIC BLOOD PRESSURE: 68 MMHG | OXYGEN SATURATION: 100 % | BODY MASS INDEX: 23.67 KG/M2

## 2024-11-20 DIAGNOSIS — Z01.419 WELL WOMAN EXAM WITH ROUTINE GYNECOLOGICAL EXAM: Primary | ICD-10-CM

## 2024-11-20 DIAGNOSIS — Z12.31 VISIT FOR SCREENING MAMMOGRAM: ICD-10-CM

## 2024-11-20 PROCEDURE — 99396 PREV VISIT EST AGE 40-64: CPT | Performed by: OBSTETRICS & GYNECOLOGY

## 2024-11-20 NOTE — PROGRESS NOTES
Lorraine Huston is a 44 year old female , who presents for annual exam.   No incontinence, or unusual discharge.   She has about 100 to 125 ml blood loss per day.  This past menses she had only a couple of heavy days and she had about 150 ml a day.     Last cholesterol:   Recent Labs   Lab Test 24  1537 23  0944   CHOL 235* 236*   HDL 60 71   * 127*   TRIG 258* 189*     Component      Latest Ref Rng 2024  3:37 PM   WBC      4.0 - 11.0 10e3/uL 4.8    RBC Count      3.80 - 5.20 10e6/uL 4.55    Hemoglobin      11.7 - 15.7 g/dL 13.9    Hematocrit      35.0 - 47.0 % 41.0    Platelet Count      150 - 450 10e3/uL 225    TSH      0.30 - 4.20 uIU/mL 2.38            Past Medical History:   Diagnosis Date    Family history of diabetes mellitus     Hashimoto's thyroiditis     Infertility     clomid pregnancy    Uncomplicated asthma     Under controlled. Asthma only when having a very bad cough       Past Surgical History:   Procedure Laterality Date    ABDOMEN SURGERY  09    C section (twin babies)    C/SECTION, LOW TRANSVERSE  2009    , Low Transverse    DILATION AND CURETTAGE, OPERATIVE HYSTEROSCOPY WITH MORCELLATOR, COMBINED N/A 2024    DILATION AND CURETTAGE, OPERATIVE HYSTEROSCOPY WITH MORCELLATOR, COMBINED N/A 2024    Procedure: HYSTEROSCOPY, WITH DILATION AND CURETTAGE OF UTERUS USING MORCELLATOR;  Surgeon: Soren Parekh MD;  Location: [facility]       OB History    Para Term  AB Living   2 2 0 2 0 2   SAB IAB Ectopic Multiple Live Births   0 0 0 1 2      # Outcome Date GA Lbr Connor/2nd Weight Sex Type Anes PTL Lv   2  09 36w5d  2.325 kg (5 lb 2 oz) M CS-Unspec   SY      Birth Comments: Twin B      Name: Kylah RAMIREZ Robel      Apgar1: 8  Apgar5: 8   1  09 36w5d  2.045 kg (4 lb 8.1 oz) F CS-Unspec   SY      Birth Comments: Twin A      Name: Alysia SCHILLING Robel      Apgar1: 8  Apgar5: 9       Gyn History:  Gynecological  History            Patient's last menstrual period was 11/07/2024 (exact date).     No STD /no PID/no IUD     history of abnormal pap smear:  no  Last pap:   Lab Results   Component Value Date    PAP NIL 01/10/2019           Current Outpatient Medications   Medication Sig Dispense Refill    albuterol (PROAIR HFA/PROVENTIL HFA/VENTOLIN HFA) 108 (90 Base) MCG/ACT inhaler Inhale 2 puffs into the lungs every 6 hours as needed for cough 8.5 g 0    ascorbic acid (VITAMIN C) 250 MG CHEW chewable tablet Take 250 mg by mouth daily      ferrous sulfate (FEROSUL) 325 (65 Fe) MG tablet Take 325 mg by mouth daily (with breakfast)      fish oil-omega-3 fatty acids 500 MG capsule 500 capsules daily      Multiple Vitamins-Minerals (MULTIVITAMIN & MINERAL PO)       vitamin D3 (CHOLECALCIFEROL) 50 mcg (2000 units) tablet Take 1 tablet by mouth daily         Allergies   Allergen Reactions    Hydrocodone     Latex      From allergy test    Methylisothiazolinone      From allergy testing    Thimerosal      From allergy testing       Social History     Socioeconomic History    Marital status:      Spouse name: Not on file    Number of children: Not on file    Years of education: Not on file    Highest education level: Not on file   Occupational History    Occupation: Paraprofessional   Tobacco Use    Smoking status: Never    Smokeless tobacco: Never   Vaping Use    Vaping status: Never Used   Substance and Sexual Activity    Alcohol use: No    Drug use: No    Sexual activity: Yes     Partners: Male     Birth control/protection: Male Surgical     Comment: vasectomy   Other Topics Concern     Service No    Blood Transfusions No    Caffeine Concern No    Occupational Exposure No    Hobby Hazards Yes     Comment: down hill ski    Sleep Concern No    Stress Concern No    Weight Concern No    Special Diet Yes     Comment: eat healthy (not a milk drinker drink calcium OJ    Back Care Yes     Comment: Left upper and Left shoulder     Exercise Yes    Bike Helmet Not Asked     Comment: N/A    Seat Belt Yes    Self-Exams Yes    Parent/sibling w/ CABG, MI or angioplasty before 65F 55M? No   Social History Narrative    November 8, 2018    ENVIRONMENTAL HISTORY: The family [place] year [place] The home is heated with a forced air. They do have central air conditioning. The patient's bedroom is furnished with carpeting in bedroom. No pets inside the house. There is no history of cockroach or mice infestation. There are no smokers in the house.  The house does not have a damp basement.      Social Drivers of Health     Financial Resource Strain: Low Risk  (8/5/2024)    Financial Resource Strain     Within the past 12 months, have you or your family members you live with been unable to get utilities (heat, electricity) when it was really needed?: No   Food Insecurity: Low Risk  (8/5/2024)    Food Insecurity     Within the past 12 months, did you worry that your food would run out before you got money to buy more?: No     Within the past 12 months, did the food you bought just not last and you didn t have money to get more?: No   Transportation Needs: Low Risk  (8/5/2024)    Transportation Needs     Within the past 12 months, has lack of transportation kept you from medical appointments, getting your medicines, non-medical meetings or appointments, work, or from getting things that you need?: No   Physical Activity: Sufficiently Active (8/5/2024)    Exercise Vital Sign     Days of Exercise per Week: 4 days     Minutes of Exercise per Session: 50 min   Stress: No Stress Concern Present (8/5/2024)    [employer] [place] [employer] - Occupational Stress Questionnaire     Feeling of Stress : Not at all   Social Connections: Unknown (8/5/2024)    Social Connection and Isolation Panel [NHANES]     Frequency of Communication with Friends and Family: Not on file     Frequency of Social Gatherings with Friends and Family:  Once a week     Attends Tenriism Services: Not on file     Active Member of Clubs or Organizations: Not on file     Attends Club or Organization Meetings: Not on file     Marital Status: Not on file   Interpersonal Safety: Low Risk  (8/5/2024)    Interpersonal Safety     Do you feel physically and emotionally safe where you currently live?: Yes     Within the past 12 months, have you been hit, slapped, kicked or otherwise physically hurt by someone?: No     Within the past 12 months, have you been humiliated or emotionally abused in other ways by your partner or ex-partner?: No   Housing Stability: High Risk (8/5/2024)    Housing Stability     Do you have housing? : No     Are you worried about losing your housing?: No       Family History   Problem Relation Age of Onset    Diabetes Maternal Grandmother     Heart Disease Father     Hypertension Father     Diabetes Father     Cancer Maternal Grandfather         lung    Deep Vein Thrombosis Paternal Grandmother     Hypertension Paternal Grandmother     Asthma Brother          ROS:  All negative except as above.      EXAM:  /68 (BP Location: Right arm)   Pulse 65   Wt 56.8 kg (125 lb 3.2 oz)   LMP 11/07/2024 (Exact Date)   SpO2 100%   BMI 23.67 kg/m    General:  WNWD female, NAD  Alert  Oriented x 3  Gait:  Normal  Skin:  Normal skin turgor  Neurologic:  CN grossly intact, good sensation.    HEENT:  NC/AT, EOMI  Neck:  No masses palpated, symmetrical, carotids +2/4, no bruits heard  Heart:  RRR  Lungs:  Clear   Breasts:  Symmetrical, no dimpling noted, no masses palpated, no discharge expressed  Abdomen:  Non-tender, non-distended.  Vulva: No external lesions, normal hair distribution, no adenopathy  BUS:  Normal, no masses noted  Urethra:  No hypermobility noted  Urethral meatus:  No masses noted  Vagina: Moist, pink, no abnormal discharge, well rugated, no lesions  Cervix: Smooth, pink, no visible lesions  Uterus: Normal size, anteverted, non-tender,  "mobile  Ovaries: No mass, non-tender, mobile  Perianal:  No masses noted.    Extremities:  No clubbing, cyanosis, or edema      ASSESSMENT/PLAN   Annual examination   Her menses are still better than when she had the D&C in February, 2024.  Should there be significant change in the amount or the frequency of bleeding, she may need repeat evaluation.  We discussed the advantages of the medical and surgical management options.  She will think about these and decide.   Mammogram order is placed.  I recommend to have the mammogram yearly due to the \"heterogeneously dense\" description.  The 3-D mammograms will be better for evaluation of the dense tissue.  I recommend to check with insurance about coverage.   Low fat diet, weight bearing exercises and self breast exams on a monthly basis have been recommended.  I have discussed with patient the risks, benefits, medications, treatment options and modalities.   I have instructed the patient to call or schedule a follow-up appointment if any problems.    "

## 2024-11-21 ENCOUNTER — PATIENT OUTREACH (OUTPATIENT)
Dept: CARE COORDINATION | Facility: CLINIC | Age: 44
End: 2024-11-21
Payer: COMMERCIAL

## 2025-01-03 ENCOUNTER — ANCILLARY PROCEDURE (OUTPATIENT)
Dept: MAMMOGRAPHY | Facility: HOSPITAL | Age: 45
End: 2025-01-03
Attending: OBSTETRICS & GYNECOLOGY
Payer: COMMERCIAL

## 2025-01-03 DIAGNOSIS — Z12.31 VISIT FOR SCREENING MAMMOGRAM: ICD-10-CM

## 2025-01-03 PROCEDURE — 77063 BREAST TOMOSYNTHESIS BI: CPT

## 2025-03-05 ENCOUNTER — MYC MEDICAL ADVICE (OUTPATIENT)
Dept: OBGYN | Facility: CLINIC | Age: 45
End: 2025-03-05
Payer: COMMERCIAL

## 2025-03-05 NOTE — TELEPHONE ENCOUNTER
Mychart message received from patient stating her last 4 periods have been light & lasting only 3 days.       D&C Date of Surgery 02/01/2024   HYSTEROSCOPY, WITH DILATION AND CURETTAGE OF UTERUS USING MORCELLATOR     Last seen 4/17/2024 for AUB  We discussed the D&C is not a good treatment for bleeding as the cycles usually go back to what they were previously, within a few cycles. I suspect her cycles will become closer to her customary amount of bleeding within a few months, perhaps sooner.     Patient reports her periods have been very light since December - light flow and less days - lasting 3 days.    Before December May through October 2024 had medium flow each month.    Cycles remain ever 28-30 days.  Patient states provider told her to reach out if she experiences a drastic change.      Advised, ok to continue to monitor and reach out with any heavy or increase bleeding or severe pain.   Routing to provider for further recommendations.       Randa BLANCHARD RN - [facility]

## 2025-06-24 PROBLEM — E78.00 HIGH CHOLESTEROL: Status: ACTIVE | Noted: 2022-08-29

## 2025-08-11 ENCOUNTER — OFFICE VISIT (OUTPATIENT)
Dept: FAMILY MEDICINE | Facility: CLINIC | Age: 45
End: 2025-08-11
Payer: COMMERCIAL

## 2025-08-11 VITALS
OXYGEN SATURATION: 98 % | DIASTOLIC BLOOD PRESSURE: 70 MMHG | HEART RATE: 64 BPM | WEIGHT: 121 LBS | RESPIRATION RATE: 16 BRPM | BODY MASS INDEX: 22.26 KG/M2 | TEMPERATURE: 97.2 F | HEIGHT: 62 IN | SYSTOLIC BLOOD PRESSURE: 104 MMHG

## 2025-08-11 DIAGNOSIS — R87.610 ASCUS OF CERVIX WITH NEGATIVE HIGH RISK HPV: ICD-10-CM

## 2025-08-11 DIAGNOSIS — L98.9 SKIN LESION: ICD-10-CM

## 2025-08-11 DIAGNOSIS — J45.20 MILD INTERMITTENT ASTHMA WITHOUT COMPLICATION: ICD-10-CM

## 2025-08-11 DIAGNOSIS — Z12.11 SCREEN FOR COLON CANCER: ICD-10-CM

## 2025-08-11 DIAGNOSIS — Z00.00 ROUTINE GENERAL MEDICAL EXAMINATION AT A HEALTH CARE FACILITY: ICD-10-CM

## 2025-08-11 LAB
CHOLEST SERPL-MCNC: 236 MG/DL
ERYTHROCYTE [DISTWIDTH] IN BLOOD BY AUTOMATED COUNT: 11.5 % (ref 10–15)
FASTING STATUS PATIENT QL REPORTED: YES
FASTING STATUS PATIENT QL REPORTED: YES
GLUCOSE SERPL-MCNC: 87 MG/DL (ref 70–99)
HCT VFR BLD AUTO: 40.8 % (ref 35–47)
HDLC SERPL-MCNC: 74 MG/DL
HGB BLD-MCNC: 13.7 G/DL (ref 11.7–15.7)
LDLC SERPL CALC-MCNC: 142 MG/DL
MCH RBC QN AUTO: 29.7 PG (ref 26.5–33)
MCHC RBC AUTO-ENTMCNC: 33.6 G/DL (ref 31.5–36.5)
MCV RBC AUTO: 89 FL (ref 78–100)
NONHDLC SERPL-MCNC: 162 MG/DL
PLATELET # BLD AUTO: 230 10E3/UL (ref 150–450)
RBC # BLD AUTO: 4.61 10E6/UL (ref 3.8–5.2)
TRIGL SERPL-MCNC: 100 MG/DL
TSH SERPL DL<=0.005 MIU/L-ACNC: 2.4 UIU/ML (ref 0.3–4.2)
WBC # BLD AUTO: 4.5 10E3/UL (ref 4–11)

## 2025-08-11 PROCEDURE — 82947 ASSAY GLUCOSE BLOOD QUANT: CPT | Performed by: FAMILY MEDICINE

## 2025-08-11 PROCEDURE — 36415 COLL VENOUS BLD VENIPUNCTURE: CPT | Performed by: FAMILY MEDICINE

## 2025-08-11 PROCEDURE — 84443 ASSAY THYROID STIM HORMONE: CPT | Performed by: FAMILY MEDICINE

## 2025-08-11 PROCEDURE — 80061 LIPID PANEL: CPT | Performed by: FAMILY MEDICINE

## 2025-08-11 PROCEDURE — 99396 PREV VISIT EST AGE 40-64: CPT | Performed by: FAMILY MEDICINE

## 2025-08-11 PROCEDURE — 85027 COMPLETE CBC AUTOMATED: CPT | Performed by: FAMILY MEDICINE

## 2025-08-11 SDOH — HEALTH STABILITY: PHYSICAL HEALTH: ON AVERAGE, HOW MANY DAYS PER WEEK DO YOU ENGAGE IN MODERATE TO STRENUOUS EXERCISE (LIKE A BRISK WALK)?: 5 DAYS

## 2025-08-11 ASSESSMENT — ASTHMA QUESTIONNAIRES
QUESTION_3 LAST FOUR WEEKS HOW OFTEN DID YOUR ASTHMA SYMPTOMS (WHEEZING, COUGHING, SHORTNESS OF BREATH, CHEST TIGHTNESS OR PAIN) WAKE YOU UP AT NIGHT OR EARLIER THAN USUAL IN THE MORNING: NOT AT ALL
QUESTION_5 LAST FOUR WEEKS HOW WOULD YOU RATE YOUR ASTHMA CONTROL: COMPLETELY CONTROLLED
QUESTION_2 LAST FOUR WEEKS HOW OFTEN HAVE YOU HAD SHORTNESS OF BREATH: NOT AT ALL
QUESTION_4 LAST FOUR WEEKS HOW OFTEN HAVE YOU USED YOUR RESCUE INHALER OR NEBULIZER MEDICATION (SUCH AS ALBUTEROL): NOT AT ALL
ACT_TOTALSCORE: 25
QUESTION_1 LAST FOUR WEEKS HOW MUCH OF THE TIME DID YOUR ASTHMA KEEP YOU FROM GETTING AS MUCH DONE AT WORK, SCHOOL OR AT HOME: NONE OF THE TIME

## 2025-08-11 ASSESSMENT — SOCIAL DETERMINANTS OF HEALTH (SDOH): HOW OFTEN DO YOU GET TOGETHER WITH FRIENDS OR RELATIVES?: ONCE A WEEK

## 2025-08-11 ASSESSMENT — PAIN SCALES - GENERAL: PAINLEVEL_OUTOF10: NO PAIN (0)

## 2025-08-12 ENCOUNTER — PATIENT OUTREACH (OUTPATIENT)
Dept: CARE COORDINATION | Facility: CLINIC | Age: 45
End: 2025-08-12
Payer: COMMERCIAL

## (undated) DEVICE — DRSG TELFA 3X8" 1238

## (undated) DEVICE — KIT ENDO FIRST STEP DISINFECTANT 200ML W/POUCH EP-4

## (undated) DEVICE — PREP SKIN SCRUB TRAY 4461A

## (undated) DEVICE — SYR 50ML LL W/O NDL 309653

## (undated) DEVICE — KIT PROCEDURE FLUENT IN/OUT FLOWPAK TISS TRAP FLT-112S

## (undated) DEVICE — SEAL SET MYOSURE ROD LENS SCOPE SINGLE USE 40-902

## (undated) DEVICE — SOL WATER IRRIG 1000ML BOTTLE 07139-09

## (undated) DEVICE — PAD PERI W/WINGS 1580A

## (undated) DEVICE — Device

## (undated) DEVICE — DRAPE GYN/UROLOGY FLUID POUCH TUR 29455

## (undated) DEVICE — GLOVE BIOGEL PI ULTRATOUCH G SZ 7.5 42175

## (undated) DEVICE — PACK MINOR SBA15MIFSE

## (undated) RX ORDER — ACETAMINOPHEN 325 MG/1
TABLET ORAL
Status: DISPENSED
Start: 2024-02-01

## (undated) RX ORDER — FENTANYL CITRATE 50 UG/ML
INJECTION, SOLUTION INTRAMUSCULAR; INTRAVENOUS
Status: DISPENSED
Start: 2024-02-01

## (undated) RX ORDER — ONDANSETRON 2 MG/ML
INJECTION INTRAMUSCULAR; INTRAVENOUS
Status: DISPENSED
Start: 2024-02-01

## (undated) RX ORDER — DEXAMETHASONE SODIUM PHOSPHATE 4 MG/ML
INJECTION, SOLUTION INTRA-ARTICULAR; INTRALESIONAL; INTRAMUSCULAR; INTRAVENOUS; SOFT TISSUE
Status: DISPENSED
Start: 2024-02-01